# Patient Record
Sex: MALE | Race: WHITE | NOT HISPANIC OR LATINO | Employment: OTHER | URBAN - METROPOLITAN AREA
[De-identification: names, ages, dates, MRNs, and addresses within clinical notes are randomized per-mention and may not be internally consistent; named-entity substitution may affect disease eponyms.]

---

## 2017-01-09 ENCOUNTER — OFFICE VISIT - HEALTHEAST (OUTPATIENT)
Dept: INTERNAL MEDICINE | Facility: CLINIC | Age: 77
End: 2017-01-09

## 2017-01-09 DIAGNOSIS — N39.0 UTI (URINARY TRACT INFECTION): ICD-10-CM

## 2017-01-09 LAB
CHOLEST SERPL-MCNC: 153 MG/DL
FASTING STATUS PATIENT QL REPORTED: NO
HBA1C MFR BLD: 7.5 % (ref 3.5–6)
HDLC SERPL-MCNC: 51 MG/DL
LDLC SERPL CALC-MCNC: 81 MG/DL
PSA SERPL-MCNC: 5.7 NG/ML (ref 0–6.5)
TRIGL SERPL-MCNC: 104 MG/DL

## 2017-01-09 ASSESSMENT — MIFFLIN-ST. JEOR: SCORE: 1400.52

## 2017-01-10 ENCOUNTER — COMMUNICATION - HEALTHEAST (OUTPATIENT)
Dept: INTERNAL MEDICINE | Facility: CLINIC | Age: 77
End: 2017-01-10

## 2017-01-12 ENCOUNTER — COMMUNICATION - HEALTHEAST (OUTPATIENT)
Dept: INTERNAL MEDICINE | Facility: CLINIC | Age: 77
End: 2017-01-12

## 2017-02-16 ENCOUNTER — COMMUNICATION - HEALTHEAST (OUTPATIENT)
Dept: INTERNAL MEDICINE | Facility: CLINIC | Age: 77
End: 2017-02-16

## 2017-03-02 ENCOUNTER — RECORDS - HEALTHEAST (OUTPATIENT)
Dept: ADMINISTRATIVE | Facility: OTHER | Age: 77
End: 2017-03-02

## 2017-03-16 ENCOUNTER — COMMUNICATION - HEALTHEAST (OUTPATIENT)
Dept: INTERNAL MEDICINE | Facility: CLINIC | Age: 77
End: 2017-03-16

## 2017-03-16 DIAGNOSIS — E11.9 DIABETES (H): ICD-10-CM

## 2017-03-23 ENCOUNTER — OFFICE VISIT - HEALTHEAST (OUTPATIENT)
Dept: INTERNAL MEDICINE | Facility: CLINIC | Age: 77
End: 2017-03-23

## 2017-03-23 DIAGNOSIS — E11.9 DIABETES MELLITUS, TYPE 2 (H): ICD-10-CM

## 2017-03-23 LAB
CHOLEST SERPL-MCNC: 149 MG/DL
FASTING STATUS PATIENT QL REPORTED: YES
HBA1C MFR BLD: 7.1 % (ref 3.5–6)
HDLC SERPL-MCNC: 56 MG/DL
LDLC SERPL CALC-MCNC: 77 MG/DL
TRIGL SERPL-MCNC: 80 MG/DL

## 2017-03-23 ASSESSMENT — MIFFLIN-ST. JEOR: SCORE: 1377.84

## 2017-03-24 ENCOUNTER — COMMUNICATION - HEALTHEAST (OUTPATIENT)
Dept: INTERNAL MEDICINE | Facility: CLINIC | Age: 77
End: 2017-03-24

## 2017-04-17 ENCOUNTER — RECORDS - HEALTHEAST (OUTPATIENT)
Dept: ADMINISTRATIVE | Facility: OTHER | Age: 77
End: 2017-04-17

## 2017-06-23 ENCOUNTER — COMMUNICATION - HEALTHEAST (OUTPATIENT)
Dept: INTERNAL MEDICINE | Facility: CLINIC | Age: 77
End: 2017-06-23

## 2017-06-23 ENCOUNTER — OFFICE VISIT - HEALTHEAST (OUTPATIENT)
Dept: INTERNAL MEDICINE | Facility: CLINIC | Age: 77
End: 2017-06-23

## 2017-06-23 DIAGNOSIS — E11.9 DIABETES (H): ICD-10-CM

## 2017-06-23 DIAGNOSIS — E11.9 DIABETES MELLITUS, TYPE 2 (H): ICD-10-CM

## 2017-06-23 LAB
CHOLEST SERPL-MCNC: 152 MG/DL
FASTING STATUS PATIENT QL REPORTED: NO
HBA1C MFR BLD: 6.9 % (ref 3.5–6)
HDLC SERPL-MCNC: 56 MG/DL
LDLC SERPL CALC-MCNC: 80 MG/DL
TRIGL SERPL-MCNC: 78 MG/DL

## 2017-06-23 ASSESSMENT — MIFFLIN-ST. JEOR: SCORE: 1382.37

## 2017-06-26 ENCOUNTER — COMMUNICATION - HEALTHEAST (OUTPATIENT)
Dept: INTERNAL MEDICINE | Facility: CLINIC | Age: 77
End: 2017-06-26

## 2017-09-12 ENCOUNTER — COMMUNICATION - HEALTHEAST (OUTPATIENT)
Dept: INTERNAL MEDICINE | Facility: CLINIC | Age: 77
End: 2017-09-12

## 2017-09-13 ENCOUNTER — RECORDS - HEALTHEAST (OUTPATIENT)
Dept: ADMINISTRATIVE | Facility: OTHER | Age: 77
End: 2017-09-13

## 2017-09-15 ENCOUNTER — OFFICE VISIT - HEALTHEAST (OUTPATIENT)
Dept: INTERNAL MEDICINE | Facility: CLINIC | Age: 77
End: 2017-09-15

## 2017-09-15 DIAGNOSIS — E11.9 DIABETES MELLITUS, TYPE 2 (H): ICD-10-CM

## 2017-09-15 ASSESSMENT — MIFFLIN-ST. JEOR: SCORE: 1368.77

## 2017-09-21 ENCOUNTER — COMMUNICATION - HEALTHEAST (OUTPATIENT)
Dept: INTERNAL MEDICINE | Facility: CLINIC | Age: 77
End: 2017-09-21

## 2017-09-21 DIAGNOSIS — E11.9 DIABETES (H): ICD-10-CM

## 2017-09-22 ENCOUNTER — OFFICE VISIT - HEALTHEAST (OUTPATIENT)
Dept: INTERNAL MEDICINE | Facility: CLINIC | Age: 77
End: 2017-09-22

## 2017-09-22 ENCOUNTER — COMMUNICATION - HEALTHEAST (OUTPATIENT)
Dept: TELEHEALTH | Facility: CLINIC | Age: 77
End: 2017-09-22

## 2017-09-22 DIAGNOSIS — E11.9 DIABETES MELLITUS, TYPE 2 (H): ICD-10-CM

## 2017-09-22 LAB
CHOLEST SERPL-MCNC: 133 MG/DL
FASTING STATUS PATIENT QL REPORTED: YES
HBA1C MFR BLD: 7.3 % (ref 3.5–6)
HDLC SERPL-MCNC: 43 MG/DL
LDLC SERPL CALC-MCNC: 76 MG/DL
TRIGL SERPL-MCNC: 68 MG/DL

## 2017-09-22 ASSESSMENT — MIFFLIN-ST. JEOR: SCORE: 1355.16

## 2017-09-25 ENCOUNTER — COMMUNICATION - HEALTHEAST (OUTPATIENT)
Dept: INTERNAL MEDICINE | Facility: CLINIC | Age: 77
End: 2017-09-25

## 2017-10-24 ENCOUNTER — AMBULATORY - HEALTHEAST (OUTPATIENT)
Dept: NURSING | Facility: CLINIC | Age: 77
End: 2017-10-24

## 2017-10-24 DIAGNOSIS — Z23 NEED FOR PROPHYLACTIC VACCINATION AND INOCULATION AGAINST INFLUENZA: ICD-10-CM

## 2017-11-13 ENCOUNTER — COMMUNICATION - HEALTHEAST (OUTPATIENT)
Dept: INTERNAL MEDICINE | Facility: CLINIC | Age: 77
End: 2017-11-13

## 2017-11-17 ENCOUNTER — RECORDS - HEALTHEAST (OUTPATIENT)
Dept: ADMINISTRATIVE | Facility: OTHER | Age: 77
End: 2017-11-17

## 2017-11-28 ENCOUNTER — COMMUNICATION - HEALTHEAST (OUTPATIENT)
Dept: INTERNAL MEDICINE | Facility: CLINIC | Age: 77
End: 2017-11-28

## 2017-11-30 ENCOUNTER — OFFICE VISIT - HEALTHEAST (OUTPATIENT)
Dept: INTERNAL MEDICINE | Facility: CLINIC | Age: 77
End: 2017-11-30

## 2017-11-30 DIAGNOSIS — E11.9 DIABETES MELLITUS, TYPE 2 (H): ICD-10-CM

## 2017-11-30 LAB
CHOLEST SERPL-MCNC: 172 MG/DL
FASTING STATUS PATIENT QL REPORTED: NORMAL
HBA1C MFR BLD: 6.8 % (ref 3.5–6)
HDLC SERPL-MCNC: 64 MG/DL
LDLC SERPL CALC-MCNC: 95 MG/DL
TRIGL SERPL-MCNC: 67 MG/DL

## 2017-11-30 ASSESSMENT — MIFFLIN-ST. JEOR: SCORE: 1341.55

## 2017-12-01 ENCOUNTER — COMMUNICATION - HEALTHEAST (OUTPATIENT)
Dept: INTERNAL MEDICINE | Facility: CLINIC | Age: 77
End: 2017-12-01

## 2017-12-18 ENCOUNTER — COMMUNICATION - HEALTHEAST (OUTPATIENT)
Dept: INTERNAL MEDICINE | Facility: CLINIC | Age: 77
End: 2017-12-18

## 2017-12-18 DIAGNOSIS — E11.9 DIABETES (H): ICD-10-CM

## 2017-12-20 ENCOUNTER — RECORDS - HEALTHEAST (OUTPATIENT)
Dept: ADMINISTRATIVE | Facility: OTHER | Age: 77
End: 2017-12-20

## 2018-02-13 ENCOUNTER — COMMUNICATION - HEALTHEAST (OUTPATIENT)
Dept: INTERNAL MEDICINE | Facility: CLINIC | Age: 78
End: 2018-02-13

## 2018-02-28 ENCOUNTER — RECORDS - HEALTHEAST (OUTPATIENT)
Dept: ADMINISTRATIVE | Facility: OTHER | Age: 78
End: 2018-02-28

## 2018-03-28 ENCOUNTER — COMMUNICATION - HEALTHEAST (OUTPATIENT)
Dept: INTERNAL MEDICINE | Facility: CLINIC | Age: 78
End: 2018-03-28

## 2018-03-28 DIAGNOSIS — E11.9 DIABETES (H): ICD-10-CM

## 2018-04-23 ENCOUNTER — RECORDS - HEALTHEAST (OUTPATIENT)
Dept: ADMINISTRATIVE | Facility: OTHER | Age: 78
End: 2018-04-23

## 2018-04-25 ENCOUNTER — RECORDS - HEALTHEAST (OUTPATIENT)
Dept: ADMINISTRATIVE | Facility: OTHER | Age: 78
End: 2018-04-25

## 2018-05-04 ENCOUNTER — RECORDS - HEALTHEAST (OUTPATIENT)
Dept: ADMINISTRATIVE | Facility: OTHER | Age: 78
End: 2018-05-04

## 2018-06-19 ENCOUNTER — OFFICE VISIT - HEALTHEAST (OUTPATIENT)
Dept: INTERNAL MEDICINE | Facility: CLINIC | Age: 78
End: 2018-06-19

## 2018-06-19 DIAGNOSIS — Z00.00 ROUTINE GENERAL MEDICAL EXAMINATION AT A HEALTH CARE FACILITY: ICD-10-CM

## 2018-06-19 LAB
ALBUMIN SERPL-MCNC: 4.1 G/DL (ref 3.5–5)
ALBUMIN UR-MCNC: NEGATIVE MG/DL
ALP SERPL-CCNC: 79 U/L (ref 45–120)
ALT SERPL W P-5'-P-CCNC: 27 U/L (ref 0–45)
ANION GAP SERPL CALCULATED.3IONS-SCNC: 11 MMOL/L (ref 5–18)
APPEARANCE UR: CLEAR
AST SERPL W P-5'-P-CCNC: 26 U/L (ref 0–40)
BACTERIA #/AREA URNS HPF: ABNORMAL HPF
BILIRUB SERPL-MCNC: 1 MG/DL (ref 0–1)
BILIRUB UR QL STRIP: NEGATIVE
BUN SERPL-MCNC: 18 MG/DL (ref 8–28)
CALCIUM SERPL-MCNC: 9.5 MG/DL (ref 8.5–10.5)
CHLORIDE BLD-SCNC: 103 MMOL/L (ref 98–107)
CHOLEST SERPL-MCNC: 148 MG/DL
CO2 SERPL-SCNC: 25 MMOL/L (ref 22–31)
COLOR UR AUTO: YELLOW
CREAT SERPL-MCNC: 1.29 MG/DL (ref 0.7–1.3)
ERYTHROCYTE [DISTWIDTH] IN BLOOD BY AUTOMATED COUNT: 13.4 % (ref 11–14.5)
FASTING STATUS PATIENT QL REPORTED: YES
GFR SERPL CREATININE-BSD FRML MDRD: 54 ML/MIN/1.73M2
GLUCOSE BLD-MCNC: 174 MG/DL (ref 70–125)
GLUCOSE UR STRIP-MCNC: NEGATIVE MG/DL
HBA1C MFR BLD: 7.5 % (ref 3.5–6)
HCT VFR BLD AUTO: 44.9 % (ref 40–54)
HDLC SERPL-MCNC: 54 MG/DL
HGB BLD-MCNC: 15.6 G/DL (ref 14–18)
HGB UR QL STRIP: NEGATIVE
KETONES UR STRIP-MCNC: NEGATIVE MG/DL
LDLC SERPL CALC-MCNC: 77 MG/DL
LEUKOCYTE ESTERASE UR QL STRIP: ABNORMAL
MCH RBC QN AUTO: 32 PG (ref 27–34)
MCHC RBC AUTO-ENTMCNC: 34.7 G/DL (ref 32–36)
MCV RBC AUTO: 92 FL (ref 80–100)
NITRATE UR QL: NEGATIVE
PH UR STRIP: 7 [PH] (ref 5–8)
PLATELET # BLD AUTO: 196 THOU/UL (ref 140–440)
PMV BLD AUTO: 9.4 FL (ref 8.5–12.5)
POTASSIUM BLD-SCNC: 4.3 MMOL/L (ref 3.5–5)
PROT SERPL-MCNC: 7.3 G/DL (ref 6–8)
PSA SERPL-MCNC: 5.5 NG/ML (ref 0–6.5)
RBC # BLD AUTO: 4.87 MILL/UL (ref 4.4–6.2)
RBC #/AREA URNS AUTO: ABNORMAL HPF
SODIUM SERPL-SCNC: 139 MMOL/L (ref 136–145)
SP GR UR STRIP: 1.02 (ref 1–1.03)
SQUAMOUS #/AREA URNS AUTO: ABNORMAL LPF
TRIGL SERPL-MCNC: 83 MG/DL
TSH SERPL DL<=0.005 MIU/L-ACNC: 2.04 UIU/ML (ref 0.3–5)
UROBILINOGEN UR STRIP-ACNC: ABNORMAL
WBC #/AREA URNS AUTO: ABNORMAL HPF
WBC: 7.3 THOU/UL (ref 4–11)

## 2018-06-19 ASSESSMENT — MIFFLIN-ST. JEOR: SCORE: 1355.16

## 2018-06-20 ENCOUNTER — COMMUNICATION - HEALTHEAST (OUTPATIENT)
Dept: INTERNAL MEDICINE | Facility: CLINIC | Age: 78
End: 2018-06-20

## 2018-06-20 LAB — BACTERIA SPEC CULT: NO GROWTH

## 2018-06-26 ENCOUNTER — COMMUNICATION - HEALTHEAST (OUTPATIENT)
Dept: INTERNAL MEDICINE | Facility: CLINIC | Age: 78
End: 2018-06-26

## 2018-06-26 DIAGNOSIS — E11.9 DIABETES (H): ICD-10-CM

## 2018-09-23 ENCOUNTER — COMMUNICATION - HEALTHEAST (OUTPATIENT)
Dept: INTERNAL MEDICINE | Facility: CLINIC | Age: 78
End: 2018-09-23

## 2018-09-23 DIAGNOSIS — E11.9 DIABETES (H): ICD-10-CM

## 2018-10-08 ENCOUNTER — AMBULATORY - HEALTHEAST (OUTPATIENT)
Dept: NURSING | Facility: CLINIC | Age: 78
End: 2018-10-08

## 2018-10-08 DIAGNOSIS — Z23 NEED FOR INFLUENZA VACCINATION: ICD-10-CM

## 2018-10-18 ENCOUNTER — RECORDS - HEALTHEAST (OUTPATIENT)
Dept: ADMINISTRATIVE | Facility: OTHER | Age: 78
End: 2018-10-18

## 2018-10-29 ENCOUNTER — OFFICE VISIT - HEALTHEAST (OUTPATIENT)
Dept: INTERNAL MEDICINE | Facility: CLINIC | Age: 78
End: 2018-10-29

## 2018-10-29 DIAGNOSIS — E11.9 DIABETES MELLITUS, TYPE 2 (H): ICD-10-CM

## 2018-10-29 LAB — HBA1C MFR BLD: 7.9 % (ref 3.5–6)

## 2018-10-29 ASSESSMENT — MIFFLIN-ST. JEOR: SCORE: 1364.23

## 2018-10-30 ENCOUNTER — OFFICE VISIT - HEALTHEAST (OUTPATIENT)
Dept: CARDIOLOGY | Facility: CLINIC | Age: 78
End: 2018-10-30

## 2018-10-30 DIAGNOSIS — R07.89 CHEST DISCOMFORT: ICD-10-CM

## 2018-10-30 LAB
CHOLEST SERPL-MCNC: 146 MG/DL
FASTING STATUS PATIENT QL REPORTED: YES
FASTING STATUS PATIENT QL REPORTED: YES
GLUCOSE BLD-MCNC: 138 MG/DL (ref 70–125)
HDLC SERPL-MCNC: 58 MG/DL
LDLC SERPL CALC-MCNC: 67 MG/DL
TRIGL SERPL-MCNC: 104 MG/DL

## 2018-10-30 ASSESSMENT — MIFFLIN-ST. JEOR: SCORE: 1364.23

## 2018-10-31 ENCOUNTER — COMMUNICATION - HEALTHEAST (OUTPATIENT)
Dept: INTERNAL MEDICINE | Facility: CLINIC | Age: 78
End: 2018-10-31

## 2018-10-31 LAB
ATRIAL RATE - MUSE: 62 BPM
DIASTOLIC BLOOD PRESSURE - MUSE: NORMAL MMHG
INTERPRETATION ECG - MUSE: NORMAL
P AXIS - MUSE: 79 DEGREES
PR INTERVAL - MUSE: 156 MS
QRS DURATION - MUSE: 104 MS
QT - MUSE: 406 MS
QTC - MUSE: 412 MS
R AXIS - MUSE: -2 DEGREES
SYSTOLIC BLOOD PRESSURE - MUSE: NORMAL MMHG
T AXIS - MUSE: 55 DEGREES
VENTRICULAR RATE- MUSE: 62 BPM

## 2018-11-01 ENCOUNTER — COMMUNICATION - HEALTHEAST (OUTPATIENT)
Dept: INTERNAL MEDICINE | Facility: CLINIC | Age: 78
End: 2018-11-01

## 2018-11-14 ENCOUNTER — COMMUNICATION - HEALTHEAST (OUTPATIENT)
Dept: CARDIOLOGY | Facility: CLINIC | Age: 78
End: 2018-11-14

## 2018-11-14 ENCOUNTER — HOSPITAL ENCOUNTER (OUTPATIENT)
Dept: CARDIOLOGY | Facility: CLINIC | Age: 78
Discharge: HOME | End: 2018-11-14
Attending: INTERNAL MEDICINE

## 2018-11-14 DIAGNOSIS — R07.89 CHEST DISCOMFORT: ICD-10-CM

## 2018-11-14 LAB
CV STRESS CURRENT BP HE: NORMAL
CV STRESS CURRENT HR HE: 103
CV STRESS CURRENT HR HE: 103
CV STRESS CURRENT HR HE: 104
CV STRESS CURRENT HR HE: 104
CV STRESS CURRENT HR HE: 110
CV STRESS CURRENT HR HE: 111
CV STRESS CURRENT HR HE: 112
CV STRESS CURRENT HR HE: 113
CV STRESS CURRENT HR HE: 120
CV STRESS CURRENT HR HE: 121
CV STRESS CURRENT HR HE: 122
CV STRESS CURRENT HR HE: 122
CV STRESS CURRENT HR HE: 126
CV STRESS CURRENT HR HE: 132
CV STRESS CURRENT HR HE: 135
CV STRESS CURRENT HR HE: 145
CV STRESS CURRENT HR HE: 145
CV STRESS CURRENT HR HE: 148
CV STRESS CURRENT HR HE: 150
CV STRESS CURRENT HR HE: 66
CV STRESS CURRENT HR HE: 74
CV STRESS CURRENT HR HE: 88
CV STRESS CURRENT HR HE: 90
CV STRESS CURRENT HR HE: 94
CV STRESS CURRENT HR HE: 94
CV STRESS CURRENT HR HE: 95
CV STRESS CURRENT HR HE: 95
CV STRESS CURRENT HR HE: 96
CV STRESS CURRENT HR HE: 96
CV STRESS CURRENT HR HE: 99
CV STRESS DEVIATION TIME HE: NORMAL
CV STRESS ECHO PERCENT HR HE: NORMAL
CV STRESS EXERCISE STAGE HE: NORMAL
CV STRESS FINAL RESTING BP HE: NORMAL
CV STRESS FINAL RESTING HR HE: 95
CV STRESS MAX HR HE: 151
CV STRESS MAX TREADMILL GRADE HE: 16
CV STRESS MAX TREADMILL SPEED HE: 4.2
CV STRESS PEAK DIA BP HE: NORMAL
CV STRESS PEAK SYS BP HE: NORMAL
CV STRESS PHASE HE: NORMAL
CV STRESS PROTOCOL HE: NORMAL
CV STRESS RESTING PT POSITION HE: NORMAL
CV STRESS RESTING PT POSITION HE: NORMAL
CV STRESS ST DEVIATION AMOUNT HE: NORMAL
CV STRESS ST DEVIATION ELEVATION HE: NORMAL
CV STRESS ST EVELATION AMOUNT HE: NORMAL
CV STRESS TEST TYPE HE: NORMAL
CV STRESS TOTAL STAGE TIME MIN 1 HE: NORMAL
STRESS ECHO BASELINE BP: NORMAL
STRESS ECHO BASELINE HR: 66
STRESS ECHO CALCULATED PERCENT HR: 106 %
STRESS ECHO LAST STRESS BP: NORMAL
STRESS ECHO LAST STRESS HR: 150
STRESS ECHO POST ESTIMATED WORKLOAD: 11.9
STRESS ECHO POST EXERCISE DUR MIN: 10
STRESS ECHO POST EXERCISE DUR SEC: 15
STRESS ECHO TARGET HR: 122

## 2018-12-28 ENCOUNTER — COMMUNICATION - HEALTHEAST (OUTPATIENT)
Dept: INTERNAL MEDICINE | Facility: CLINIC | Age: 78
End: 2018-12-28

## 2018-12-28 DIAGNOSIS — E11.9 DIABETES (H): ICD-10-CM

## 2019-01-31 ENCOUNTER — OFFICE VISIT - HEALTHEAST (OUTPATIENT)
Dept: INTERNAL MEDICINE | Facility: CLINIC | Age: 79
End: 2019-01-31

## 2019-01-31 DIAGNOSIS — E11.8 TYPE 2 DIABETES MELLITUS WITH COMPLICATION, WITHOUT LONG-TERM CURRENT USE OF INSULIN (H): ICD-10-CM

## 2019-01-31 LAB
CHOLEST SERPL-MCNC: 162 MG/DL
FASTING STATUS PATIENT QL REPORTED: YES
FASTING STATUS PATIENT QL REPORTED: YES
GLUCOSE BLD-MCNC: 163 MG/DL (ref 70–125)
HBA1C MFR BLD: 7.4 % (ref 3.5–6)
HDLC SERPL-MCNC: 65 MG/DL
LDLC SERPL CALC-MCNC: 80 MG/DL
TRIGL SERPL-MCNC: 86 MG/DL

## 2019-01-31 ASSESSMENT — MIFFLIN-ST. JEOR: SCORE: 1373.3

## 2019-02-01 ENCOUNTER — COMMUNICATION - HEALTHEAST (OUTPATIENT)
Dept: INTERNAL MEDICINE | Facility: CLINIC | Age: 79
End: 2019-02-01

## 2019-04-02 ENCOUNTER — COMMUNICATION - HEALTHEAST (OUTPATIENT)
Dept: INTERNAL MEDICINE | Facility: CLINIC | Age: 79
End: 2019-04-02

## 2019-04-02 DIAGNOSIS — E11.9 DIABETES (H): ICD-10-CM

## 2019-05-02 ENCOUNTER — OFFICE VISIT - HEALTHEAST (OUTPATIENT)
Dept: INTERNAL MEDICINE | Facility: CLINIC | Age: 79
End: 2019-05-02

## 2019-05-02 DIAGNOSIS — E11.8 TYPE 2 DIABETES MELLITUS WITH COMPLICATION, WITHOUT LONG-TERM CURRENT USE OF INSULIN (H): ICD-10-CM

## 2019-05-02 LAB
CHOLEST SERPL-MCNC: 135 MG/DL
CREAT UR-MCNC: 173.2 MG/DL
FASTING STATUS PATIENT QL REPORTED: YES
FASTING STATUS PATIENT QL REPORTED: YES
GLUCOSE BLD-MCNC: 165 MG/DL (ref 70–125)
HBA1C MFR BLD: 8.1 % (ref 3.5–6)
HDLC SERPL-MCNC: 55 MG/DL
LDLC SERPL CALC-MCNC: 64 MG/DL
MICROALBUMIN UR-MCNC: 0.94 MG/DL (ref 0–1.99)
MICROALBUMIN/CREAT UR: 5.4 MG/G
TRIGL SERPL-MCNC: 81 MG/DL

## 2019-05-02 ASSESSMENT — MIFFLIN-ST. JEOR: SCORE: 1368.77

## 2019-05-03 ENCOUNTER — COMMUNICATION - HEALTHEAST (OUTPATIENT)
Dept: INTERNAL MEDICINE | Facility: CLINIC | Age: 79
End: 2019-05-03

## 2019-08-02 ENCOUNTER — AMBULATORY - HEALTHEAST (OUTPATIENT)
Dept: INTERNAL MEDICINE | Facility: CLINIC | Age: 79
End: 2019-08-02

## 2019-08-02 ENCOUNTER — OFFICE VISIT - HEALTHEAST (OUTPATIENT)
Dept: INTERNAL MEDICINE | Facility: CLINIC | Age: 79
End: 2019-08-02

## 2019-08-02 DIAGNOSIS — Z00.00 ROUTINE GENERAL MEDICAL EXAMINATION AT A HEALTH CARE FACILITY: ICD-10-CM

## 2019-08-02 DIAGNOSIS — E11.8 TYPE 2 DIABETES MELLITUS WITH COMPLICATION, WITHOUT LONG-TERM CURRENT USE OF INSULIN (H): ICD-10-CM

## 2019-08-02 LAB
ALBUMIN SERPL-MCNC: 4.1 G/DL (ref 3.5–5)
ALBUMIN UR-MCNC: NEGATIVE MG/DL
ALP SERPL-CCNC: 81 U/L (ref 45–120)
ALT SERPL W P-5'-P-CCNC: 21 U/L (ref 0–45)
ANION GAP SERPL CALCULATED.3IONS-SCNC: 9 MMOL/L (ref 5–18)
APPEARANCE UR: CLEAR
AST SERPL W P-5'-P-CCNC: 22 U/L (ref 0–40)
BACTERIA #/AREA URNS HPF: ABNORMAL HPF
BILIRUB SERPL-MCNC: 1 MG/DL (ref 0–1)
BILIRUB UR QL STRIP: NEGATIVE
BUN SERPL-MCNC: 18 MG/DL (ref 8–28)
CALCIUM SERPL-MCNC: 9.4 MG/DL (ref 8.5–10.5)
CHLORIDE BLD-SCNC: 103 MMOL/L (ref 98–107)
CHOLEST SERPL-MCNC: 147 MG/DL
CO2 SERPL-SCNC: 27 MMOL/L (ref 22–31)
COLOR UR AUTO: YELLOW
CREAT SERPL-MCNC: 1.3 MG/DL (ref 0.7–1.3)
CREAT UR-MCNC: 148.7 MG/DL
ERYTHROCYTE [DISTWIDTH] IN BLOOD BY AUTOMATED COUNT: 11.8 % (ref 11–14.5)
FASTING STATUS PATIENT QL REPORTED: YES
GFR SERPL CREATININE-BSD FRML MDRD: 54 ML/MIN/1.73M2
GLUCOSE BLD-MCNC: 174 MG/DL (ref 70–125)
GLUCOSE UR STRIP-MCNC: NEGATIVE MG/DL
HBA1C MFR BLD: 8 % (ref 3.5–6)
HCT VFR BLD AUTO: 44.7 % (ref 40–54)
HDLC SERPL-MCNC: 57 MG/DL
HGB BLD-MCNC: 15.3 G/DL (ref 14–18)
HGB UR QL STRIP: ABNORMAL
KETONES UR STRIP-MCNC: NEGATIVE MG/DL
LDLC SERPL CALC-MCNC: 74 MG/DL
LEUKOCYTE ESTERASE UR QL STRIP: ABNORMAL
MCH RBC QN AUTO: 32.3 PG (ref 27–34)
MCHC RBC AUTO-ENTMCNC: 34.2 G/DL (ref 32–36)
MCV RBC AUTO: 94 FL (ref 80–100)
MICROALBUMIN UR-MCNC: 1.09 MG/DL (ref 0–1.99)
MICROALBUMIN/CREAT UR: 7.3 MG/G
NITRATE UR QL: NEGATIVE
PH UR STRIP: 6 [PH] (ref 5–8)
PLATELET # BLD AUTO: 202 THOU/UL (ref 140–440)
PMV BLD AUTO: 7.2 FL (ref 7–10)
POTASSIUM BLD-SCNC: 4 MMOL/L (ref 3.5–5)
PROT SERPL-MCNC: 7 G/DL (ref 6–8)
RBC # BLD AUTO: 4.75 MILL/UL (ref 4.4–6.2)
RBC #/AREA URNS AUTO: ABNORMAL HPF
SODIUM SERPL-SCNC: 139 MMOL/L (ref 136–145)
SP GR UR STRIP: 1.01 (ref 1–1.03)
SQUAMOUS #/AREA URNS AUTO: ABNORMAL LPF
TRIGL SERPL-MCNC: 81 MG/DL
TSH SERPL DL<=0.005 MIU/L-ACNC: 1.92 UIU/ML (ref 0.3–5)
UROBILINOGEN UR STRIP-ACNC: ABNORMAL
WBC #/AREA URNS AUTO: ABNORMAL HPF
WBC: 6.9 THOU/UL (ref 4–11)

## 2019-08-02 ASSESSMENT — MIFFLIN-ST. JEOR: SCORE: 1341.55

## 2019-08-03 LAB — BACTERIA SPEC CULT: NORMAL

## 2019-08-05 ENCOUNTER — COMMUNICATION - HEALTHEAST (OUTPATIENT)
Dept: INTERNAL MEDICINE | Facility: CLINIC | Age: 79
End: 2019-08-05

## 2019-09-03 ENCOUNTER — AMBULATORY - HEALTHEAST (OUTPATIENT)
Dept: EDUCATION SERVICES | Facility: CLINIC | Age: 79
End: 2019-09-03

## 2019-09-03 DIAGNOSIS — Z00.00 GENERAL MEDICAL EXAM: ICD-10-CM

## 2019-09-03 DIAGNOSIS — E11.8 TYPE 2 DIABETES MELLITUS WITH COMPLICATION, WITHOUT LONG-TERM CURRENT USE OF INSULIN (H): ICD-10-CM

## 2019-09-03 RX ORDER — CYCLOPENTOLATE HYDROCHLORIDE 10 MG/ML
1 SOLUTION/ DROPS OPHTHALMIC DAILY
Qty: 180 TABLET | Refills: 0 | Status: SHIPPED
Start: 2019-09-03

## 2019-09-17 ENCOUNTER — RECORDS - HEALTHEAST (OUTPATIENT)
Dept: ADMINISTRATIVE | Facility: OTHER | Age: 79
End: 2019-09-17

## 2019-09-19 ENCOUNTER — HOSPITAL ENCOUNTER (OUTPATIENT)
Dept: PHYSICAL MEDICINE AND REHAB | Facility: CLINIC | Age: 79
Discharge: HOME | End: 2019-09-19
Attending: PHYSICAL MEDICINE & REHABILITATION

## 2019-09-19 DIAGNOSIS — M48.061 STENOSIS OF LATERAL RECESS OF LUMBAR SPINE: ICD-10-CM

## 2019-09-19 DIAGNOSIS — M48.061 LUMBAR FORAMINAL STENOSIS: ICD-10-CM

## 2019-09-19 DIAGNOSIS — M47.816 LUMBAR FACET ARTHROPATHY: ICD-10-CM

## 2019-09-19 DIAGNOSIS — M54.42 ACUTE LEFT-SIDED LOW BACK PAIN WITH LEFT-SIDED SCIATICA: ICD-10-CM

## 2019-09-19 ASSESSMENT — MIFFLIN-ST. JEOR: SCORE: 1375.57

## 2019-10-01 ENCOUNTER — RECORDS - HEALTHEAST (OUTPATIENT)
Dept: HEALTH INFORMATION MANAGEMENT | Facility: CLINIC | Age: 79
End: 2019-10-01

## 2019-10-04 ENCOUNTER — RECORDS - HEALTHEAST (OUTPATIENT)
Dept: ADMINISTRATIVE | Facility: OTHER | Age: 79
End: 2019-10-04

## 2019-10-07 ENCOUNTER — COMMUNICATION - HEALTHEAST (OUTPATIENT)
Dept: INTERNAL MEDICINE | Facility: CLINIC | Age: 79
End: 2019-10-07

## 2019-10-07 DIAGNOSIS — Z00.00 ROUTINE GENERAL MEDICAL EXAMINATION AT A HEALTH CARE FACILITY: ICD-10-CM

## 2019-10-15 ENCOUNTER — AMBULATORY - HEALTHEAST (OUTPATIENT)
Dept: NURSING | Facility: CLINIC | Age: 79
End: 2019-10-15

## 2019-10-15 DIAGNOSIS — Z23 FLU VACCINE NEED: ICD-10-CM

## 2019-10-17 ENCOUNTER — COMMUNICATION - HEALTHEAST (OUTPATIENT)
Dept: PHYSICAL MEDICINE AND REHAB | Facility: CLINIC | Age: 79
End: 2019-10-17

## 2019-10-28 ENCOUNTER — COMMUNICATION - HEALTHEAST (OUTPATIENT)
Dept: INTERNAL MEDICINE | Facility: CLINIC | Age: 79
End: 2019-10-28

## 2019-11-30 ENCOUNTER — COMMUNICATION - HEALTHEAST (OUTPATIENT)
Dept: SCHEDULING | Facility: CLINIC | Age: 79
End: 2019-11-30

## 2019-12-02 ENCOUNTER — COMMUNICATION - HEALTHEAST (OUTPATIENT)
Dept: INTERNAL MEDICINE | Facility: CLINIC | Age: 79
End: 2019-12-02

## 2019-12-02 DIAGNOSIS — Z00.00 ROUTINE GENERAL MEDICAL EXAMINATION AT A HEALTH CARE FACILITY: ICD-10-CM

## 2019-12-06 ENCOUNTER — OFFICE VISIT - HEALTHEAST (OUTPATIENT)
Dept: INTERNAL MEDICINE | Facility: CLINIC | Age: 79
End: 2019-12-06

## 2019-12-06 ENCOUNTER — AMBULATORY - HEALTHEAST (OUTPATIENT)
Dept: INTERNAL MEDICINE | Facility: CLINIC | Age: 79
End: 2019-12-06

## 2019-12-06 ENCOUNTER — COMMUNICATION - HEALTHEAST (OUTPATIENT)
Dept: PHYSICAL MEDICINE AND REHAB | Facility: CLINIC | Age: 79
End: 2019-12-06

## 2019-12-06 DIAGNOSIS — R05.9 COUGH: ICD-10-CM

## 2019-12-06 DIAGNOSIS — M47.816 LUMBAR FACET ARTHROPATHY: ICD-10-CM

## 2019-12-06 DIAGNOSIS — Z00.00 ROUTINE GENERAL MEDICAL EXAMINATION AT A HEALTH CARE FACILITY: ICD-10-CM

## 2019-12-06 DIAGNOSIS — E11.69 TYPE 2 DIABETES MELLITUS WITH OTHER SPECIFIED COMPLICATION, WITHOUT LONG-TERM CURRENT USE OF INSULIN (H): ICD-10-CM

## 2019-12-06 DIAGNOSIS — M48.061 STENOSIS OF LATERAL RECESS OF LUMBAR SPINE: ICD-10-CM

## 2019-12-06 DIAGNOSIS — M48.061 LUMBAR FORAMINAL STENOSIS: ICD-10-CM

## 2019-12-06 DIAGNOSIS — M54.42 ACUTE LEFT-SIDED LOW BACK PAIN WITH LEFT-SIDED SCIATICA: ICD-10-CM

## 2019-12-06 LAB
CHOLEST SERPL-MCNC: 121 MG/DL
FASTING STATUS PATIENT QL REPORTED: YES
FASTING STATUS PATIENT QL REPORTED: YES
GLUCOSE BLD-MCNC: 178 MG/DL (ref 70–125)
HBA1C MFR BLD: 7.7 % (ref 3.5–6)
HDLC SERPL-MCNC: 43 MG/DL
LDLC SERPL CALC-MCNC: 63 MG/DL
TRIGL SERPL-MCNC: 74 MG/DL

## 2019-12-06 ASSESSMENT — MIFFLIN-ST. JEOR: SCORE: 1355.16

## 2019-12-09 ENCOUNTER — COMMUNICATION - HEALTHEAST (OUTPATIENT)
Dept: INTERNAL MEDICINE | Facility: CLINIC | Age: 79
End: 2019-12-09

## 2020-01-10 ENCOUNTER — OFFICE VISIT - HEALTHEAST (OUTPATIENT)
Dept: INTERNAL MEDICINE | Facility: CLINIC | Age: 80
End: 2020-01-10

## 2020-01-10 ENCOUNTER — COMMUNICATION - HEALTHEAST (OUTPATIENT)
Dept: INTERNAL MEDICINE | Facility: CLINIC | Age: 80
End: 2020-01-10

## 2020-01-10 DIAGNOSIS — E11.69 TYPE 2 DIABETES MELLITUS WITH OTHER SPECIFIED COMPLICATION, WITHOUT LONG-TERM CURRENT USE OF INSULIN (H): ICD-10-CM

## 2020-01-10 DIAGNOSIS — E11.9 DIABETES (H): ICD-10-CM

## 2020-01-10 ASSESSMENT — MIFFLIN-ST. JEOR: SCORE: 1377.84

## 2020-01-15 ENCOUNTER — COMMUNICATION - HEALTHEAST (OUTPATIENT)
Dept: INTERNAL MEDICINE | Facility: CLINIC | Age: 80
End: 2020-01-15

## 2020-01-15 DIAGNOSIS — E11.69 TYPE 2 DIABETES MELLITUS WITH OTHER SPECIFIED COMPLICATION, WITHOUT LONG-TERM CURRENT USE OF INSULIN (H): ICD-10-CM

## 2020-01-17 ENCOUNTER — COMMUNICATION - HEALTHEAST (OUTPATIENT)
Dept: INTERNAL MEDICINE | Facility: CLINIC | Age: 80
End: 2020-01-17

## 2020-01-17 DIAGNOSIS — E11.69 TYPE 2 DIABETES MELLITUS WITH OTHER SPECIFIED COMPLICATION, WITHOUT LONG-TERM CURRENT USE OF INSULIN (H): ICD-10-CM

## 2020-07-09 ENCOUNTER — OFFICE VISIT - HEALTHEAST (OUTPATIENT)
Dept: INTERNAL MEDICINE | Facility: CLINIC | Age: 80
End: 2020-07-09

## 2020-07-09 DIAGNOSIS — E11.8 TYPE 2 DIABETES MELLITUS WITH COMPLICATION, WITHOUT LONG-TERM CURRENT USE OF INSULIN (H): ICD-10-CM

## 2020-08-25 ENCOUNTER — AMBULATORY - HEALTHEAST (OUTPATIENT)
Dept: LAB | Facility: CLINIC | Age: 80
End: 2020-08-25

## 2020-08-25 DIAGNOSIS — E11.8 TYPE 2 DIABETES MELLITUS WITH COMPLICATION, WITHOUT LONG-TERM CURRENT USE OF INSULIN (H): ICD-10-CM

## 2020-08-25 LAB
CHOLEST SERPL-MCNC: 156 MG/DL
CREAT UR-MCNC: 71.9 MG/DL
FASTING STATUS PATIENT QL REPORTED: ABNORMAL
FASTING STATUS PATIENT QL REPORTED: NORMAL
GLUCOSE BLD-MCNC: 140 MG/DL (ref 70–125)
HBA1C MFR BLD: 6.9 %
HDLC SERPL-MCNC: 55 MG/DL
LDLC SERPL CALC-MCNC: 78 MG/DL
MICROALBUMIN UR-MCNC: 0.71 MG/DL (ref 0–1.99)
MICROALBUMIN/CREAT UR: 9.9 MG/G
TRIGL SERPL-MCNC: 114 MG/DL

## 2020-08-26 ENCOUNTER — COMMUNICATION - HEALTHEAST (OUTPATIENT)
Dept: INTERNAL MEDICINE | Facility: CLINIC | Age: 80
End: 2020-08-26

## 2020-09-01 ENCOUNTER — RECORDS - HEALTHEAST (OUTPATIENT)
Dept: ADMINISTRATIVE | Facility: OTHER | Age: 80
End: 2020-09-01

## 2020-10-06 ENCOUNTER — RECORDS - HEALTHEAST (OUTPATIENT)
Dept: ADMINISTRATIVE | Facility: OTHER | Age: 80
End: 2020-10-06

## 2020-10-18 ENCOUNTER — AMBULATORY - HEALTHEAST (OUTPATIENT)
Dept: NURSING | Facility: CLINIC | Age: 80
End: 2020-10-18

## 2020-11-28 ENCOUNTER — COMMUNICATION - HEALTHEAST (OUTPATIENT)
Dept: EDUCATION SERVICES | Facility: CLINIC | Age: 80
End: 2020-11-28

## 2020-11-28 DIAGNOSIS — E11.8 TYPE 2 DIABETES MELLITUS WITH COMPLICATION, WITHOUT LONG-TERM CURRENT USE OF INSULIN (H): ICD-10-CM

## 2021-01-07 ENCOUNTER — COMMUNICATION - HEALTHEAST (OUTPATIENT)
Dept: INTERNAL MEDICINE | Facility: CLINIC | Age: 81
End: 2021-01-07

## 2021-01-07 DIAGNOSIS — E11.9 DIABETES (H): ICD-10-CM

## 2021-01-08 RX ORDER — SIMVASTATIN 40 MG
40 TABLET ORAL DAILY
Qty: 90 TABLET | Refills: 1 | Status: SHIPPED | OUTPATIENT
Start: 2021-01-08 | End: 2021-07-06

## 2021-01-12 ENCOUNTER — RECORDS - HEALTHEAST (OUTPATIENT)
Dept: ADMINISTRATIVE | Facility: OTHER | Age: 81
End: 2021-01-12

## 2021-01-18 ENCOUNTER — RECORDS - HEALTHEAST (OUTPATIENT)
Dept: ADMINISTRATIVE | Facility: OTHER | Age: 81
End: 2021-01-18

## 2021-01-18 ENCOUNTER — COMMUNICATION - HEALTHEAST (OUTPATIENT)
Dept: INTERNAL MEDICINE | Facility: CLINIC | Age: 81
End: 2021-01-18

## 2021-03-26 ENCOUNTER — OFFICE VISIT - HEALTHEAST (OUTPATIENT)
Dept: INTERNAL MEDICINE | Facility: CLINIC | Age: 81
End: 2021-03-26

## 2021-03-26 ENCOUNTER — AMBULATORY - HEALTHEAST (OUTPATIENT)
Dept: LAB | Facility: CLINIC | Age: 81
End: 2021-03-26

## 2021-03-26 DIAGNOSIS — Z12.5 SCREENING FOR PROSTATE CANCER: ICD-10-CM

## 2021-03-26 DIAGNOSIS — E11.8 TYPE 2 DIABETES MELLITUS WITH COMPLICATION, WITHOUT LONG-TERM CURRENT USE OF INSULIN (H): ICD-10-CM

## 2021-03-26 DIAGNOSIS — Z00.00 ROUTINE GENERAL MEDICAL EXAMINATION AT A HEALTH CARE FACILITY: ICD-10-CM

## 2021-03-26 LAB
ALBUMIN SERPL-MCNC: 4.4 G/DL (ref 3.5–5)
ALP SERPL-CCNC: 90 U/L (ref 45–120)
ALT SERPL W P-5'-P-CCNC: 25 U/L (ref 0–45)
ANION GAP SERPL CALCULATED.3IONS-SCNC: 11 MMOL/L (ref 5–18)
AST SERPL W P-5'-P-CCNC: 28 U/L (ref 0–40)
BILIRUB SERPL-MCNC: 0.8 MG/DL (ref 0–1)
BUN SERPL-MCNC: 22 MG/DL (ref 8–28)
CALCIUM SERPL-MCNC: 9.2 MG/DL (ref 8.5–10.5)
CHLORIDE BLD-SCNC: 101 MMOL/L (ref 98–107)
CHOLEST SERPL-MCNC: 162 MG/DL
CO2 SERPL-SCNC: 26 MMOL/L (ref 22–31)
CREAT SERPL-MCNC: 1.3 MG/DL (ref 0.7–1.3)
ERYTHROCYTE [DISTWIDTH] IN BLOOD BY AUTOMATED COUNT: 12.9 % (ref 11–14.5)
FASTING STATUS PATIENT QL REPORTED: YES
GFR SERPL CREATININE-BSD FRML MDRD: 53 ML/MIN/1.73M2
GLUCOSE BLD-MCNC: 188 MG/DL (ref 70–125)
HBA1C MFR BLD: 7.7 %
HCT VFR BLD AUTO: 44.3 % (ref 40–54)
HDLC SERPL-MCNC: 53 MG/DL
HGB BLD-MCNC: 15.2 G/DL (ref 14–18)
LDLC SERPL CALC-MCNC: 89 MG/DL
MCH RBC QN AUTO: 31.7 PG (ref 27–34)
MCHC RBC AUTO-ENTMCNC: 34.3 G/DL (ref 32–36)
MCV RBC AUTO: 92 FL (ref 80–100)
PLATELET # BLD AUTO: 234 THOU/UL (ref 140–440)
PMV BLD AUTO: 9.9 FL (ref 7–10)
POTASSIUM BLD-SCNC: 4.2 MMOL/L (ref 3.5–5)
PROT SERPL-MCNC: 7.4 G/DL (ref 6–8)
PSA SERPL-MCNC: 4.7 NG/ML (ref 0–6.5)
RBC # BLD AUTO: 4.8 MILL/UL (ref 4.4–6.2)
SODIUM SERPL-SCNC: 138 MMOL/L (ref 136–145)
TRIGL SERPL-MCNC: 98 MG/DL
TSH SERPL DL<=0.005 MIU/L-ACNC: 2.6 UIU/ML (ref 0.3–5)
WBC: 8.5 THOU/UL (ref 4–11)

## 2021-03-26 ASSESSMENT — ANXIETY QUESTIONNAIRES
2. NOT BEING ABLE TO STOP OR CONTROL WORRYING: NOT AT ALL
1. FEELING NERVOUS, ANXIOUS, OR ON EDGE: NOT AT ALL

## 2021-03-26 ASSESSMENT — MIFFLIN-ST. JEOR: SCORE: 1364.23

## 2021-03-29 ENCOUNTER — COMMUNICATION - HEALTHEAST (OUTPATIENT)
Dept: INTERNAL MEDICINE | Facility: CLINIC | Age: 81
End: 2021-03-29

## 2021-04-12 ENCOUNTER — HOSPITAL ENCOUNTER (OUTPATIENT)
Dept: PHYSICAL MEDICINE AND REHAB | Facility: CLINIC | Age: 81
Discharge: HOME | End: 2021-04-12
Attending: PHYSICAL MEDICINE & REHABILITATION

## 2021-04-12 DIAGNOSIS — M54.16 LUMBAR RADICULOPATHY: ICD-10-CM

## 2021-04-12 DIAGNOSIS — G89.29 CHRONIC BILATERAL LOW BACK PAIN WITH BILATERAL SCIATICA: ICD-10-CM

## 2021-04-12 DIAGNOSIS — M54.42 CHRONIC BILATERAL LOW BACK PAIN WITH BILATERAL SCIATICA: ICD-10-CM

## 2021-04-12 DIAGNOSIS — M48.061 LUMBAR FORAMINAL STENOSIS: ICD-10-CM

## 2021-04-12 DIAGNOSIS — M54.41 CHRONIC BILATERAL LOW BACK PAIN WITH BILATERAL SCIATICA: ICD-10-CM

## 2021-04-23 ENCOUNTER — HOSPITAL ENCOUNTER (OUTPATIENT)
Dept: MRI IMAGING | Facility: HOSPITAL | Age: 81
Discharge: HOME | End: 2021-04-23
Attending: PHYSICAL MEDICINE & REHABILITATION

## 2021-04-29 ENCOUNTER — HOSPITAL ENCOUNTER (OUTPATIENT)
Dept: PHYSICAL MEDICINE AND REHAB | Facility: CLINIC | Age: 81
Discharge: HOME | End: 2021-04-29
Attending: PHYSICAL MEDICINE & REHABILITATION

## 2021-04-29 DIAGNOSIS — M54.41 CHRONIC BILATERAL LOW BACK PAIN WITH BILATERAL SCIATICA: ICD-10-CM

## 2021-04-29 DIAGNOSIS — M48.061 LUMBAR FORAMINAL STENOSIS: ICD-10-CM

## 2021-04-29 DIAGNOSIS — G89.29 CHRONIC BILATERAL LOW BACK PAIN WITH BILATERAL SCIATICA: ICD-10-CM

## 2021-04-29 DIAGNOSIS — M54.42 CHRONIC BILATERAL LOW BACK PAIN WITH BILATERAL SCIATICA: ICD-10-CM

## 2021-04-29 DIAGNOSIS — M54.16 LUMBAR RADICULOPATHY: ICD-10-CM

## 2021-04-29 RX ORDER — GABAPENTIN 300 MG/1
CAPSULE ORAL
Qty: 270 CAPSULE | Refills: 1 | Status: SHIPPED | OUTPATIENT
Start: 2021-04-29 | End: 2021-12-14

## 2021-05-12 ENCOUNTER — HOSPITAL ENCOUNTER (OUTPATIENT)
Dept: PHYSICAL MEDICINE AND REHAB | Facility: CLINIC | Age: 81
Discharge: HOME | End: 2021-05-12
Attending: PHYSICAL MEDICINE & REHABILITATION

## 2021-05-12 DIAGNOSIS — M54.16 LUMBAR RADICULOPATHY: ICD-10-CM

## 2021-05-25 ENCOUNTER — HOSPITAL ENCOUNTER (OUTPATIENT)
Dept: PHYSICAL MEDICINE AND REHAB | Facility: CLINIC | Age: 81
Discharge: HOME | End: 2021-05-25
Attending: PHYSICAL MEDICINE & REHABILITATION

## 2021-05-25 DIAGNOSIS — M54.42 CHRONIC BILATERAL LOW BACK PAIN WITH BILATERAL SCIATICA: ICD-10-CM

## 2021-05-25 DIAGNOSIS — M54.41 CHRONIC BILATERAL LOW BACK PAIN WITH BILATERAL SCIATICA: ICD-10-CM

## 2021-05-25 DIAGNOSIS — M48.061 STENOSIS OF LATERAL RECESS OF LUMBAR SPINE: ICD-10-CM

## 2021-05-25 DIAGNOSIS — G89.29 CHRONIC BILATERAL LOW BACK PAIN WITH BILATERAL SCIATICA: ICD-10-CM

## 2021-05-25 ASSESSMENT — MIFFLIN-ST. JEOR: SCORE: 1390.31

## 2021-05-27 NOTE — TELEPHONE ENCOUNTER
RN cannot approve Refill Request    RN can NOT refill this medication Protocol failed and NO refill given. Last office visit: 1/31/2019 Merlin Higuera MD Last Physical: 6/19/2018 Last MTM visit: Visit date not found Last visit same specialty: 1/31/2019 Merlin Higuera MD.  Next visit within 3 mo: Visit date not found  Next physical within 3 mo: Visit date not found  Last refill:  6/26/18      Hawa Patel, [facility] Triage/Med Refill 4/2/2019    Requested Prescriptions   Pending Prescriptions Disp Refills     metFORMIN (GLUCOPHAGE) 500 MG tablet [Pharmacy Med Name: METFORMIN 500MG TABLETS] 90 tablet 2     Sig: Take 1 tablet (500 mg total) by mouth daily. As directed    Metformin Refill Protocol Failed - 4/2/2019 12:28 PM       Failed - Microalbumin in last year     Microalbumin, Random Urine   Date Value Ref Range Status   11/30/2017 0.89 0.00 - 1.99 mg/dL Final                 Passed - Blood pressure in last 12 months    BP Readings from Last 1 Encounters:   01/31/19 134/70            Passed - LFT or AST or ALT in last 12 months    Albumin   Date Value Ref Range Status   06/19/2018 4.1 3.5 - 5.0 g/dL Final     Bilirubin, Total   Date Value Ref Range Status   06/19/2018 1.0 0.0 - 1.0 mg/dL Final     Bilirubin, Direct   Date Value Ref Range Status   06/18/2012 0.19 <0.31 mg/dL Final     Alkaline Phosphatase   Date Value Ref Range Status   06/19/2018 79 45 - 120 U/L Final     AST   Date Value Ref Range Status   06/19/2018 26 0 - 40 U/L Final     ALT   Date Value Ref Range Status   06/19/2018 27 0 - 45 U/L Final     Protein, Total   Date Value Ref Range Status   06/19/2018 7.3 6.0 - 8.0 g/dL Final               Passed - GFR or Serum Creatinine in last 6 months    GFR MDRD Non Af Amer   Date Value Ref Range Status   06/19/2018 54 (L) >60 mL/min/1.73m2 Final     GFR MDRD Af Amer   Date Value Ref Range Status   06/19/2018 >60 >60 mL/min/1.73m2 Final            Passed - Visit with PCP or  prescribing provider visit in last 6 months or next 3 months    Last office visit with prescriber/PCP: 1/31/2019 OR same dept: 1/31/2019 Merlin Higuera MD OR same specialty: 1/31/2019 Merlin Higuera MD Last physical: Visit date not found Last MTM visit: Visit date not found         Next appt within 3 mo: Visit date not found  Next physical within 3 mo: Visit date not found  Prescriber OR PCP: Merlin Higuera MD  Last diagnosis associated with med order: 1. Diabetes (H)  - metFORMIN (GLUCOPHAGE) 500 MG tablet [Pharmacy Med Name: METFORMIN 500MG TABLETS]; TAKE 1 TABLET BY MOUTH EVERY DAY AS DIRECTED  Dispense: 90 tablet; Refill: 0     If protocol passes may refill for 12 months if within 3 months of last provider visit (or a total of 15 months).          Passed - A1C in last 6 months    Hemoglobin A1c   Date Value Ref Range Status   01/31/2019 7.4 (H) 3.5 - 6.0 % Final

## 2021-05-28 NOTE — PROGRESS NOTES
Office Visit - Follow up    Anthony Evangelista   78 y.o. male    Date of Visit: 5/2/2019    Chief Complaint   Patient presents with     Diabetes     Hyperlipidemia       Subjective: Diabetes mellitus type 2 with hyperlipidemia.    Needs microalbumin today.  Last laboratory tests from January 31, 2019 reviewed.    Concern regarding possibility of underlying pancreatic cancer bladder cancer.  2 friends of had similar illnesses.  The patient has not lost weight there is no back pain or jaundice no cold colored urine or mary colored stools.  No abdominal pain with radiation to the back no nausea or vomiting or anorexia.    The patient denies any bladder symptomatology no gross hematuria he is a non-smoker.    Last night his blood sugar was 250 later on recheck 150.  Leg cramps while sleeping discussed also some sciatic discomfort left lower extremity better now ate a pretzel between his blood sugars.    No blood in stool or urine no chest pain or shortness of breath melena occasion list reviewed well-tolerated normal effects suggested aspirin 81 mg daily.    ROS: A comprehensive review of systems was performed and was otherwise negative    Medications:  Prior to Admission medications    Medication Sig Start Date End Date Taking? Authorizing Provider   melatonin 5 mg cap Take by mouth.   Yes PROVIDER, HISTORICAL   metFORMIN (GLUCOPHAGE) 500 MG tablet TAKE 1 TABLET BY MOUTH EVERY DAY AS DIRECTED 6/26/18  Yes Merlin Higuera MD   MV-MN/FA/VIT K/LYCOP/LUT/ZEAXA (OCUVITE EYE + MULTI ORAL) Take by mouth.   Yes PROVIDER, HISTORICAL   ONE TOUCH DELICA LANCETS MISC Use As Directed. Test twice daily. 2/18/11  Yes Merlin Higuera MD   simvastatin (ZOCOR) 40 MG tablet TAKE 1 TABLET BY MOUTH EVERY DAY 12/29/18  Yes Merlin Higuear MD   ACCU-CHEK COMPACT PLUS TEST Strp Use 1 strip As Directed 2 (two) times a day. 2/17/17   PROVIDER, HISTORICAL   metFORMIN (GLUCOPHAGE) 500 MG tablet Take 1 tablet (500 mg total) by mouth  daily. As directed 4/3/19 5/2/19  Merlin Higuera MD       Allergies: No Known Allergies    Immunizations:   Immunization History   Administered Date(s) Administered     DT (pediatric) 03/04/2004     Influenza high dose, seasonal 10/21/2015, 10/17/2016, 10/24/2017, 10/08/2018     Influenza, inj, historic,unspecified 11/06/2007, 10/07/2008, 09/22/2009, 10/08/2010, 10/28/2011, 10/22/2014     Influenza, seasonal,quad inj 6-35 mos 10/05/2012, 10/22/2013     Pneumo Conj 13-V (2010&after) 12/10/2015     Pneumo Polysac 23-V 10/25/2006     Td,adult,historic,unspecified 03/04/2004     Tdap 07/15/2013       Exam Chest clear to auscultation and percussion.  Heart tones regular rhythm without murmur rub or gallop.  Abdomen soft nontender no organomegaly.  No peritoneal signs.  Extremities free of edema cyanosis or clubbing.  Neck veins nondistended no thyromegaly or scleral icterus noted, carotids full.  Skin warm and dry easily conversant good spirited.  Normal intelligence.  Neurologically intact no gross localizing findings.  120/70 pulse 66 respirations 18 O2 sats 99% BMI was good at 25 weight down 1 pound from previous at 155.  No known drug allergies.    Assessment and Plan  Diabetes mellitus type 2 check A1c blood sugar lipid panel and urine for microalbumin today.    Hyperlipidemia by history no history of MI or stroke.    Anxiety neurosis increase exercise recommended.  Enjoys playing golf at [employer].  [facility].    Time: total time spent with the patient was 25 minutes of which >50% was spent in counseling and coordination of care        Merlin Higuera MD    Patient Active Problem List   Diagnosis     Acne Vulgaris     Chest Pain     Type 2 Diabetes Mellitus - Uncomplicated, Uncontrolled     Benign Prostatic Hypertrophy     Sciatica     Urine Tests Nonspecific Abnormal Findings     Hyperlipidemia     Hyperglycemia     General medical exam     Routine general medical examination  at a health care facility     Knee pain

## 2021-05-30 VITALS — BODY MASS INDEX: 24.64 KG/M2 | HEIGHT: 67 IN | WEIGHT: 157 LBS

## 2021-05-30 VITALS — WEIGHT: 162 LBS | BODY MASS INDEX: 25.43 KG/M2 | HEIGHT: 67 IN

## 2021-05-31 VITALS — BODY MASS INDEX: 23.86 KG/M2 | HEIGHT: 67 IN | WEIGHT: 152 LBS

## 2021-05-31 VITALS — WEIGHT: 158 LBS | HEIGHT: 67 IN | BODY MASS INDEX: 24.8 KG/M2

## 2021-05-31 VITALS — HEIGHT: 67 IN | BODY MASS INDEX: 24.33 KG/M2 | WEIGHT: 155 LBS

## 2021-05-31 VITALS — WEIGHT: 149 LBS | HEIGHT: 67 IN | BODY MASS INDEX: 23.39 KG/M2

## 2021-05-31 NOTE — PROGRESS NOTES
Assessment: pt here for diabetes management f/u and is accompanied by his wife. Dad had diabetes. Pt. was diagnosed 5 years ago. Has been watching his diet ever since and has managed to lose 20 lbs.Wants to make changes to his lifestyle so he doesn't have to go on insulin ever.  - pt arrived without his meter/BS log. Tests, 1x/wk. Per recall, BS are between 120--180. No lows  - takes 500 mg metformin daily with no side effects.  - Pretty active overall, ~ 1 hr/day. Walks/does some stretches for his sciatica almost daily. Golfs, a couple times/wk but takes a cart typically.   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 Discussed stress less tips and resources available.  - watches his diet closely and includes lots of veggies, whole grains, protein for balance. Likes fruit, yao watermelon and bananas.  - drinks, maybe 1 small bottle of water/d      Discussed diabetes basics, AIC and BS goals, complications of uncontrolled diabetes, general diet guidelines, CHO foods, portion control, sx and tx of hyper and hypoglycemia, concept of budgeting and balancing. Patient verbalized understanding.       Plan:   Test at least 1x/d  Monitor PO intake and limit carbs to 60-75 grams/meal  Do not skip meals  Drink more water  PA/exercise as able  Take meds as prescribed  Bring meter/BS to next appt  Call with any questions/concerns  F/u in ~ 2 months     Recommend metformin be titrated up to 1000 mg/d (pt reported no concerns with the current 500 mg daily). And then gradually to 1500 mg and then 2000 mg if A1C not at goal as long as GFR >30        Subjective and Objective:      Anthony Evangelista is referred by Dr. Higuera for Diabetes Education.     Lab Results   Component Value Date    HGBA1C 8.0 (H) 08/02/2019           Education:     Monitoring   Meter (per above goals): Assessed and Discussed  Monitoring: Assessed and Discussed  BG goals: Assessed, Discussed and Literature provided    Nutrition  Management  Nutrition Management: Assessed, Discussed and Literature provided  Weight: Assessed, Discussed and Literature provided  Portions/Balance: Assessed, Discussed and Literature provided  Carb ID/Count: Assessed, Discussed and Literature provided  Label Reading: Assessed, Discussed and Literature provided  Heart Healthy Fats: Assessed, Discussed and Literature provided  Menu Planning: Assessed, Discussed and Literature provided  Dining Out: Assessed, Discussed and Literature provided  Physical Activity: Assessed, Discussed and Literature provided  Medications: Assessed, Discussed and Literature provided  Orals: Assessed and Discussed  Injected Medications: Not addressed     Diabetes Disease Process: Assessed, Discussed and Literature provided    Acute Complications: Prevent, Detect, Treat:  Hypoglycemia: Assessed, Discussed and Literature provided  Hyperglycemia: Assessed, Discussed and Literature provided  Sick Days: Assessed, Discussed and Literature provided  Driving: Not addressed    Chronic Complications  Foot Care:Assessed and Discussed  Skin Care: Assessed and Discussed  Eye: Assessed and Discussed  ABC: Assessed, Discussed and Literature provided  Teeth:Assessed and Discussed  Goal Setting and Problem Solving: Assessed and Discussed  Barriers: Assessed and Discussed  Psychosocial Adjustments: Assessed      Time spent with the patient: 60 minutes for diabetes education and counseling.   Previous Education: yes  Visit Type:ROB Mixon  9/3/2019

## 2021-05-31 NOTE — PROGRESS NOTES
Annual wellness visit  Assessment and Plan:   Annual wellness visit    1. Routine general medical examination at a health care facility  Annual wellness visit  - [facility](CBC w/o Differential)  - Comprehensive Metabolic Panel  - Lipid Cascade  - Glycosylated Hemoglobin A1c  - Thyroid Stimulating Hormone (TSH)  - Urinalysis-UC if Indicated  - Microalbumin, Random Urine     The patient's current medical problems were reviewed.    I have had an Advance Directives discussion with the patient.  The following health maintenance schedule was reviewed with the patient and provided in printed form in the after visit summary:   Health Maintenance   Topic Date Due     ZOSTER VACCINES (1 of 2) 1991     MEDICARE ANNUAL WELLNESS VISIT  2019     DIABETES FOLLOW-UP  2019     INFLUENZA VACCINE RULE BASED (1) 2019     DIABETES OPHTHALMOLOGY EXAM  10/18/2019     DIABETES HEMOGLOBIN A1C  2019     DIABETES URINE MICROALBUMIN  2020     DIABETES FOOT EXAM  2020     FALL RISK ASSESSMENT  2020     ADVANCE CARE PLANNING  2023     TD 18+ HE  07/15/2023     PNEUMOCOCCAL POLYSACCHARIDE VACCINE AGE 65 AND OVER  Completed     PNEUMOCOCCAL CONJUGATE VACCINE FOR ADULTS (PCV13 OR PREVNAR)  Completed        Subjective:   Chief Complaint: Anthony Evangelista is an 78 y.o. male here for an Annual Wellness visit.   HPI: Annual wellness visit for this type II diabetic.  Non-smoker no excess alcohol.    Colonoscopy showed tubular adenomatous polyps 2 in number 2017 [facility]    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  The patient immunizations are reviewed and up-to-date Prevnar 13 has been administered.    History of diabetes mellitus type 2 with hyperlipidemia.    Bilateral hernia repair.    Appendectomy.    Tonsillectomy.    No prior problems with any anesthetics or anesthesia for any other prior surgeries.    Mother  cancer of the liver age  78.    Father  56 myocardial infarction.  One brother has been treated for cancer of the bladder.  One daughter  at age 51 thoracic aortic aneurysm was complications.  One son Rakesh with whom he owns the [employer] business has had diverticulitis also patient of this examiner.  Wife living and well previously treated for pneumonia by this examiner.    [email or web address].    Review of Systems:    Please see above.  The rest of the review of systems are negative for all systems.    Patient Care Team:  Merlin Higuera MD as PCP - General     Patient Active Problem List   Diagnosis     Acne Vulgaris     Chest Pain     Type 2 Diabetes Mellitus - Uncomplicated, Uncontrolled     Benign Prostatic Hypertrophy     Sciatica     Urine Tests Nonspecific Abnormal Findings     Hyperlipidemia     Hyperglycemia     General medical exam     Routine general medical examination at a health care facility     Knee pain     History reviewed. No pertinent past medical history.   Past Surgical History:   Procedure Laterality Date     SD APPENDECTOMY      Description: Appendectomy;  Recorded: 2008;      History reviewed. No pertinent family history.   Social History     Socioeconomic History     Marital status:      Spouse name: Not on file     Number of children: Not on file     Years of education: Not on file     Highest education level: Not on file   Occupational History     Not on file   Social Needs     Financial resource strain: Not on file     Food insecurity:     Worry: Not on file     Inability: Not on file     Transportation needs:     Medical: Not on file     Non-medical: Not on file   Tobacco Use     Smoking status: Never Smoker     Smokeless tobacco: Never Used   Substance and Sexual Activity     Alcohol use: No     Comment: rare     Drug use: No     Sexual activity: Not on file   Lifestyle     Physical activity:     Days per week: Not on file     Minutes per session: Not on file     Stress:  "Not on file   Relationships     Social connections:     Talks on phone: Not on file     Gets together: Not on file     Attends [employer] service: Not on file     Active member of club or organization: Not on file     Attends meetings of clubs or organizations: Not on file     Relationship status: Not on file     Intimate partner violence:     Fear of current or ex partner: Not on file     Emotionally abused: Not on file     Physically abused: Not on file     Forced sexual activity: Not on file   Other Topics Concern     Not on file   Social History Narrative     Not on file      Current Outpatient Medications   Medication Sig Dispense Refill     ACCU-CHEK COMPACT PLUS TEST Strp Use 1 strip As Directed 2 (two) times a day.  11     melatonin 5 mg cap Take by mouth.       metFORMIN (GLUCOPHAGE) 500 MG tablet TAKE 1 TABLET BY MOUTH EVERY DAY AS DIRECTED 90 tablet 0     MV-MN/FA/VIT K/LYCOP/LUT/ZEAXA (OCUVITE EYE + MULTI ORAL) Take by mouth.       ONE TOUCH DELICA LANCETS MISC Use As Directed. Test twice daily.       simvastatin (ZOCOR) 40 MG tablet TAKE 1 TABLET BY MOUTH EVERY DAY 90 tablet 2     No current facility-administered medications for this visit.       Objective:   Vital Signs:   Visit Vitals  /64 (Patient Site: Right Arm, Patient Position: Sitting)   Pulse 67   Ht 5' 6.5\" (1.689 m)   Wt 149 lb (67.6 kg)   SpO2 99%   BMI 23.69 kg/m         VisionScreening:  No exam data present     PHYSICAL EXAM  Chest clear to auscultation and percussion.  Heart tones regular rhythm without murmur rub or gallop.  Abdomen soft nontender no organomegaly.  No peritoneal signs.  Extremities free of edema cyanosis or clubbing.  Neck veins nondistended no thyromegaly or scleral icterus noted, carotids full.  Skin warm and dry easily conversant good spirited.  Normal intelligence.  Neurologically intact no gross localizing findings.  Skin negative lymph negative neuro negative psych normal HEENT negative back straight no " severe spine tenderness mild complaints of sciatica but not limiting.  Genital rectal exam negative except enlarged prostate.  Good pulse noted in all 4 extremities no carotid bruits.    Assessment Results 8/2/2019   Activities of Daily Living No help needed   Instrumental Activities of Daily Living No help needed   Get Up and Go Score -   Mini Cog Total Score 5   Some recent data might be hidden     A Mini-Cog score of 0-2 suggests the possibility of dementia, score of 3-5 suggests no dementia    Identified Health Risks:     Patient's advanced directive was discussed and I am comfortable with asymptomatic from the prostate standpoint there was no nodularity seen on the prostate examination.  The patient's wishes.

## 2021-05-31 NOTE — PATIENT INSTRUCTIONS - HE
Advance Directive  Patient s advance directive was discussed and I am comfortable with the patient s wishes.  Patient Education   Personalized Prevention Plan  You are due for the preventive services outlined below.  Your care team is available to assist you in scheduling these services.  If you have already completed any of these items, please share that information with your care team to update in your medical record.  Health Maintenance   Topic Date Due     ZOSTER VACCINES (1 of 2) 09/29/1991     MEDICARE ANNUAL WELLNESS VISIT  06/19/2019     DIABETES FOLLOW-UP  07/31/2019     INFLUENZA VACCINE RULE BASED (1) 08/01/2019     DIABETES OPHTHALMOLOGY EXAM  10/18/2019     DIABETES HEMOGLOBIN A1C  11/02/2019     DIABETES URINE MICROALBUMIN  05/02/2020     DIABETES FOOT EXAM  08/02/2020     FALL RISK ASSESSMENT  08/02/2020     ADVANCE CARE PLANNING  06/19/2023     TD 18+ HE  07/15/2023     PNEUMOCOCCAL POLYSACCHARIDE VACCINE AGE 65 AND OVER  Completed     PNEUMOCOCCAL CONJUGATE VACCINE FOR ADULTS (PCV13 OR PREVNAR)  Completed

## 2021-06-01 VITALS — WEIGHT: 152 LBS | HEIGHT: 67 IN | BODY MASS INDEX: 23.86 KG/M2

## 2021-06-01 NOTE — PATIENT INSTRUCTIONS - HE
An order for physical therapy has been provided today.  You can schedule physical therapy before you leave today or someone will call you to schedule physical therapy.  It will be very important for you to do your physical therapy exercises on a regular basis to decrease your pain and prevent future flares of pain.    Gabapentin (nerve pain medication) was prescribed today.  Please use the chart to increase the dose to an amount that controls your pain (do not exceed 3 tablets three times a day).  If you have any questions on how to increase the dose or any side effects to this medication, please call the clinic.    Gabapentin 100mg Dosing Chart    DATE  MORNING AFTERNOON BEDTIME    Day 1 0 0 1    Day 2 0 0 1    Day 3 0 0 1    Day 4 1 0 1    Day 5 1 0 1    Day 6 1 0 1    Day 7 1 1 1    Day 8 1 1 1    Day 9 1 1 1    Day 10 1 1 2    Day 11 1 1 2    Day 12 1 1 2    Day 13 2 1 2    Day 14 2 1 2    Day 15 2 1 2    Day 16 2 2 2    Day 17 2 2 2    Day 18 2 2 2    Day 19 2 2 3    Day 20 2 2 3    Day 21 2 2 3    Day 22 3 2 3    Day 23 3 2 3    Day 24 3 2 3    Day 25 3 3 3    Day 26 3 3 3    Day 27 3 3 3     Continue medication, taking 3 capsules three times daily    Please call if you have any questions regarding how to take your medication  Clinic Phone # [phone]    A nurse will call you in 4 weeks to see how you are doing.  If you are doing better at that time, you are welcome to follow-up as needed.  If you are not doing better, the nurse will relay this information to the physician and then further recommendations can be made. Please do not hesitate to contact the clinic at [phone] if you have any questions/concerns or any worsening of your pain prior to that time.  You are also welcome to contact Dr. Kathleen via [facility].

## 2021-06-01 NOTE — PROGRESS NOTES
ASSESSMENT: Anthony Evangelista is a 78 y.o. male  with a BMI of 24.88 with past medical history significant for type 2 diabetes, benign prostatic hypertrophy, hyperlipidemia who presents today for new patient evaluation of acute left-sided low back pain with left radicular/sciatic type leg pain.  The patient's symptoms have been present for over 5 years, but worsened over the last few weeks.  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  If this is largely in part secondary to lumbar facet arthropathy.  The patient has a diminished left Achilles reflex, but is otherwise neurologically intact and without any red flag symptoms.    KASHIF: 14%  WHO-5: 14 (the patient is not interested in behavioral health services)    PLAN:  A shared decision making model was used.  The patient's values and choices were respected.  The following represents what was discussed and decided upon by the physician and the patient.      1.  DIAGNOSTIC TESTS: The patient's MRI of the lumbar spine from July 2013 was personally reviewed today by the physician with the physician performing her own interpretation.  The patient does have a subtle retrolisthesis at the L5-S1 level.  There is left greater than right foraminal stenosis seen at the L5-S1 level with right greater than left lateral recess stenosis seen.  Patient does have left foraminal stenosis seen at the L3-4, L4-5, L5-S1 levels, worse at the L5-S1 level.  There is significant central canal and bilateral lateral recess stenosis at the L4-5 level.  There is more mild to moderate central canal and lateral recess stenosis seen at the L3-4 level.  There is facet arthropathy seen throughout the entire lumbar spine.  Please refer to the report for details.  The images were shown to the patient and the findings were explained using a spine model.  2.  PHYSICAL THERAPY: He was interested in returning  to physical therapy as he has had good relief with physical therapy in the past.  An order was provided to [facility], as he states that this is very convenient for him as it is very close to his home.  He is encouraged to do his exercise program on a regular basis.  3.  MEDICATIONS: Discussed Gabapentin/Neurontin (nerve pain medication) mechanism of action as well as potential side effects (drowsiness/dizziness) with the patient.  The patient wanted to try this medication so Gabapentin 100 mg was prescribed.  A chart was given with how to increase the dose to a maximum of 3 tablets three times a day.  The lowest therapeutic dose should be used.   The patient is asked to call the clinic if there are any side effects to the Gabapentin or if questions arise as to how to increase the dose.  4.  INTERVENTIONS: No injections were discussed today.  Can consider injections in the future if the patient fails to have relief with physical therapy.  An updated MRI would potentially be helpful.  5.  PATIENT EDUCATION:   -The patient's questions regarding diagnosis were discussed today.  Discussed that his symptoms in the leg including going into the foot are likely from the findings of the low back.  Explained that symptoms related to diabetes are typically bilateral and not just unilateral.  -All of his questions were answered to his satisfaction today.  He was in agreement with the treatment plan.  6.  FOLLOW-UP:   The nurse navigation team is asked to call him in 4 weeks.  If he is doing better at that time he is welcome to follow-up as needed.  If he is not doing better, nurse navigation is asked to order an MRI of the lumbar spine without contrast dye (diagnosis acute left-sided low back pain with left radicular/sciatic type leg pain of over 6 weeks duration).  And the patient should make a follow-up appointment to review the MRI results.  If there are any questions/concerns or any significant worsening of pain  prior to that time, the patient is asked to call the clinic via the nurse navigation line or via [employer].       SUBJECTIVE:  Anthony Evangelista  Is a 78 y.o. male who presents today for new patient evaluation of low back pain and left radicular leg pain.  The patient was last seen in this clinic back in 2013.  He reports that he was referred to physical therapy and his pain significantly improved.  He stated that he has done well until a few weeks ago when pain started to return.  His pain currently feels the same or similar to how it did back in 2013.  The patient reports he has pain in the left low back and left buttock.  He then gets pain going into the left posterior thigh and he has noticed some pain in the bottom of the left heel.  He does not notice any weakness.  No significant numbness or tingling.  No significant pain in the right side.  The patient's pain is worse with walking for long distances.  He tries to walk 10,000 steps 3 days a week.  He notices that by the time he is getting to 9 or 10,000 steps, he is having increased pain.  He reports that after golfing, in the last few hours of golf he would have increased pain.  He does feel better with doing his physical therapy exercises.  He admits that he has not been doing these as diligently as he should.  He was doing them regularly and then because he felt better he drop back to just doing them twice a week or so.  Since his back started hurting he has not really been doing them because of the pain.  He is not taking any medications for the pain.  He has not had any injections or any history of surgery.    Medications:  Reviewed and correct in the chart.      Allergies: Reviewed and NKDA per patient.    PMH:  Reviewed and significant for type 2 diabetes, benign prostatic hypertrophy, hyperlipidemia.    PSH:  Reviewed and significant for hernia repair x2, tonsillectomy, appendectomy.    Family History:  Reviewed and significant for liver cancer in his  mother, diabetes in his father, heart disease in his father, multiple sclerosis in his mother.    Social History: Patient is  and self-employed.  He does not have to do any heavy lifting.  He drinks alcohol maybe once a month.  He denies any tobacco or illicit drug use.    ROS: Positive for joint pain, muscle pain, sciatica, tiredness (the patient reports that he does not sleep well secondary to stress and to his brain constantly thinking about things).   Specifically negative for bowel/bladder dysfunction, fevers,chills, appetite changes, unexplained weight loss.  Otherwise 13 systems reviewed are negative.  Please see the patient's intake questionnaire from today for details.      OBJECTIVE:  PHYSICAL EXAMINATION:    CONSTITUTIONAL:  Vital signs as above.  No acute distress.  The patient is well nourished and well groomed.  PSYCHIATRIC:  The patient is awake, alert, oriented to person, place, time and answering questions appropriately with clear speech.    SKIN:  Skin over the face, bilateral lower extremities, and posterior torso is clean, dry, intact without rashes.   Patient does have some peeling dry skin over the bottoms of both feet.  GAIT:  Gait is non-antalgic.  The patient is able to heel and toe walk without significant difficulty.    STANDING EXAMINATION: The patient has some mild tenderness of the left lower lumbar paraspinal muscles into the left gluteal muscle.  No tenderness over the structures on the right side.  Patient does not have any pain with lumbar flexion.  He has mild restricted range of motion with lumbar flexion.  Patient does report pain in the left buttock with lumbar extension and he has mild to moderate restriction with lumbar extension.  No significant pain with bilateral lumbar sidebending.  He does report some mild increase in pain with left lumbar rotation, but no significant pain with right lumbar rotation.  No major restricted range of motion with lumbar  rotation.  MUSCLE STRENGTH:  The patient has 5/5 strength for the bilateral hip flexors, knee flexors/extensors, ankle dorsiflexors/plantar flexors, great toe extensors, ankle evertors/invertors.  NEUROLOGICAL: 2+/4 patellar and medial hamstring reflexes bilaterally.  Patient is a 2/4 right Achilles reflex but a diminished (though present) left Achilles reflex.  Equivocal Babinski's bilaterally.  No ankle clonus bilaterally. Sensation to light touch is intact in the bilateral L4, L5, and S1 dermatomes.  VASCULAR:  2/4 dorsalis pedis pulses bilaterally.  Bilateral lower extremities are warm.  There is no pitting edema of the bilateral lower extremities.  ABDOMINAL:  Soft, non-distended, non-tender throughout all quadrants.  No pulsatile mass palpated in the left lower quadrant.  LYMPH NODES:  No palpable or tender inguinal/popliteal lymph nodes.  MUSCULOSKELETAL: It is straight leg raising test both supine and seated bilaterally.  The patient is not have any significant pain with hip range of motion testing bilaterally, with the hips in flexed position testing internal/external rotation.  Fabere's test is negative bilaterally.

## 2021-06-02 VITALS — BODY MASS INDEX: 24.17 KG/M2 | HEIGHT: 67 IN | WEIGHT: 154 LBS

## 2021-06-02 VITALS — BODY MASS INDEX: 24.33 KG/M2 | HEIGHT: 67 IN | WEIGHT: 155 LBS

## 2021-06-02 VITALS — WEIGHT: 156 LBS | HEIGHT: 67 IN | BODY MASS INDEX: 24.48 KG/M2

## 2021-06-02 NOTE — TELEPHONE ENCOUNTER
Follow up call placed to check on status 4 weeks after new patient consultation appointment. Left message to return call.

## 2021-06-02 NOTE — TELEPHONE ENCOUNTER
"Pt returned call. Pt states \"I'm doing ok. I'd like to give PT another month and see how that goes. Could you check in with me then?\" Informed pt we will call him in 1 month to see how he's doing. Reminder task created.     He also reports he is on gabapentin 100mg 3-3-3 without any side effects. He will continue with this.   "

## 2021-06-03 ENCOUNTER — RECORDS - HEALTHEAST (OUTPATIENT)
Dept: ADMINISTRATIVE | Facility: OTHER | Age: 81
End: 2021-06-03

## 2021-06-03 VITALS — BODY MASS INDEX: 23.39 KG/M2 | WEIGHT: 149 LBS | HEIGHT: 67 IN

## 2021-06-03 VITALS
HEIGHT: 67 IN | DIASTOLIC BLOOD PRESSURE: 70 MMHG | WEIGHT: 152 LBS | SYSTOLIC BLOOD PRESSURE: 124 MMHG | TEMPERATURE: 97.6 F | OXYGEN SATURATION: 98 % | BODY MASS INDEX: 23.86 KG/M2 | HEART RATE: 78 BPM

## 2021-06-03 VITALS — HEIGHT: 67 IN | BODY MASS INDEX: 24.56 KG/M2 | WEIGHT: 156.5 LBS

## 2021-06-03 LAB — RETINOPATHY: NEGATIVE

## 2021-06-03 NOTE — TELEPHONE ENCOUNTER
Anthony complaining of cold symptoms since yesterday, runny nose. No breathing concerns. Wants a pill to make his symptoms go away so it doesn't drag on for a week.      No fever, no breathing concerns. Reports he is pretty healthy otherwise.     Discussed supportive measures of fluids, rest, healthy nutrition, regular sleep. Encouraged him to monitor for any breathing concerns and let us know if any develop. He verbalized understanding.

## 2021-06-03 NOTE — TELEPHONE ENCOUNTER
1 month follow-up call placed to pt to check on status after PT. Left message for pt to return call.

## 2021-06-04 VITALS
BODY MASS INDEX: 24.64 KG/M2 | HEART RATE: 76 BPM | OXYGEN SATURATION: 98 % | DIASTOLIC BLOOD PRESSURE: 72 MMHG | WEIGHT: 157 LBS | HEIGHT: 67 IN | SYSTOLIC BLOOD PRESSURE: 134 MMHG

## 2021-06-04 NOTE — PROGRESS NOTES
Office Visit - Follow up    Anthony Evangelista   79 y.o. male    Date of Visit: 12/6/2019    Chief Complaint   Patient presents with     Cough     since Saturday     Hyperlipidemia     Diabetes       Subjective: Diabetes mellitus type 2 with cough.    No appetite weak for the last 5 days started azithromycin form of Z-Leonides Monday last.  Last laboratory tests August 2, 2019.    Symptoms of cough upper respiratory infection and sinus congestion started this past weekend.  Feels weak hard to sleep at night restless.  Postnasal drip and drainage.  Z-Leonides seems to be helping.  Tried Mucinex no benefit.  Cough is better no hemoptysis.    No blood in stool or urine medication list reviewed reconciled.    Colonoscopy dated November 17, 2017 showed 2 tubular adenomatous colon polyps.  History of hyperlipidemia without target organ damage related to same.  No history of MI or stroke.    No known drug allergies.    ROS: A comprehensive review of systems was performed and was otherwise negative    Medications:  Prior to Admission medications    Medication Sig Start Date End Date Taking? Authorizing Provider   ACCU-CHEK COMPACT PLUS TEST Strp Use 1 strip As Directed 2 (two) times a day. 2/17/17  Yes PROVIDER, HISTORICAL   metFORMIN (GLUCOPHAGE) 500 MG tablet Take 1 tablet (500 mg total) by mouth 2 (two) times a day with meals. 9/3/19  Yes Merlin Higuera MD   ui-ck-BD-vit N-ielua-tvaq-zeax (OCUVITE EYE PLUS MULTI) 200- mcg Tab Take 1 tablet by mouth daily. 9/3/19  Yes Merlin Higuera MD   ONE TOUCH DELICA LANCETS MISC Use As Directed. Test twice daily. 2/18/11  Yes Merlin Higuera MD   simvastatin (ZOCOR) 40 MG tablet TAKE 1 TABLET BY MOUTH EVERY DAY 12/29/18  Yes Merlin Higuera MD   azithromycin (ZITHROMAX Z-LEONIDES) 250 MG tablet Take 2 tablets (500 mg) on  Day 1,  followed by 1 tablet (250 mg) once daily on Days 2 through 5. 12/2/19 12/7/19  Merlin Higuera MD   gabapentin (NEURONTIN) 100 MG capsule  Increase as instructed to 3 capsules  3 times a day. 9/19/19   Beatriz Land,    melatonin 5 mg cap Take by mouth.    PROVIDER, HISTORICAL   simvastatin (ZOCOR) 40 MG tablet TAKE 1 TABLET BY MOUTH EVERY DAY 10/8/19 12/6/19  Merlin Higuera MD       Allergies: No Known Allergies    Immunizations:   Immunization History   Administered Date(s) Administered     DT (pediatric) 03/04/2004     Influenza high dose,seasonal,PF, 65+ yrs 10/21/2015, 10/17/2016, 10/24/2017, 10/08/2018, 10/15/2019     Influenza, inj, historic,unspecified 11/06/2007, 10/07/2008, 09/22/2009, 10/08/2010, 10/28/2011, 10/22/2014     Influenza, seasonal,quad inj 6-35 mos 10/05/2012, 10/22/2013     Pneumo Conj 13-V (2010&after) 12/10/2015     Pneumo Polysac 23-V 10/25/2006     Td,adult,historic,unspecified 03/04/2004     Tdap 07/15/2013       Exam Chest clear to auscultation and percussion.  Heart tones regular rhythm without murmur rub or gallop.  Abdomen soft nontender no organomegaly.  No peritoneal signs.  Extremities free of edema cyanosis or clubbing.  Neck veins nondistended no thyromegaly or scleral icterus noted, carotids full.  Skin warm and dry easily conversant good spirited.  Normal intelligence.  Neurologically intact no gross localizing findings.    124/70 pulse 78 respirations 18 O2 sats 98% temperature 97.6 degrees.  Weight down 4 pounds from previous.  Current weight 152 pounds.  BMI only 24+.    Assessment and Plan  Diabetes mellitus type 2 related to insulin resistance.  BMI is excellent.  Check A1c blood sugar lipid panel today stable.    Sinus infection with cough check chest x-ray today completing Z-Leonides.  Slightly improved.    Hyperlipidemia on statin therapy and no history of MI or stroke no history of xanthoma or xanthelasma continue statin medication.    Tubular adenomatous colon polyp see colonoscopy report November 17, 2017.  Needs repeat colonoscopy in followed by 2022 for follow-up of tubular  adenomatous colon polyps.    Time: total time spent with the patient was 25 minutes of which >50% was spent in counseling and coordination of care        Merlin Higuera MD    Patient Active Problem List   Diagnosis     Acne Vulgaris     Chest Pain     Type 2 Diabetes Mellitus - Uncomplicated, Uncontrolled     Benign Prostatic Hypertrophy     Sciatica     Urine Tests Nonspecific Abnormal Findings     Hyperlipidemia     Hyperglycemia     General medical exam     Routine general medical examination at a health care facility     Knee pain

## 2021-06-04 NOTE — TELEPHONE ENCOUNTER
No response from pt. Second attempt placed. Reached pt's wife, Lisa. Consent to communicate in chart. She will tell pt to give [facility] a call back when he is able too. Gave her the nurse navigation line #.

## 2021-06-05 VITALS
OXYGEN SATURATION: 95 % | HEIGHT: 67 IN | HEART RATE: 74 BPM | WEIGHT: 154 LBS | SYSTOLIC BLOOD PRESSURE: 134 MMHG | DIASTOLIC BLOOD PRESSURE: 68 MMHG | TEMPERATURE: 97 F | BODY MASS INDEX: 24.17 KG/M2

## 2021-06-05 NOTE — TELEPHONE ENCOUNTER
Medication Question or Clarification  Who is calling: patient   What medication are you calling about (include dose and sig)?:   ACCU-CHEK COMPACT PLUS TEST Strp 100 strip 11 1/10/2020     Sig - Route: Use 1 strip As Directed 2 (two) times a day. - Miscellaneous    Class: OTC    Who prescribed the medication?: Merlin Higuera MD  What is your question/concern?: Patient states Accu-Chek Compact Plus Test strp prescription  was not send to [facility] . Patient requesting to send electronically to pharmacy .   Requested Pharmacy: [facility] # [id number]  Okay to leave a detailed message?: No

## 2021-06-05 NOTE — TELEPHONE ENCOUNTER
Refill Request  Did you contact pharmacy: No  Medication name:   Requested Prescriptions     Pending Prescriptions Disp Refills     ACCU-CHEK COMPACT PLUS TEST Strp 100 strip 11     Sig: Use 1 strip As Directed 2 (two) times a day.     Who prescribed the medication: Merlin Higuera MD   Requested Pharmacy: [facility] #[id number]  Is patient out of medication: n/a  Patient notified refills processed in 3 business days:  yes  Okay to leave a detailed message: yes

## 2021-06-05 NOTE — PROGRESS NOTES
Office Visit - Follow up    Anthony Evangelista   79 y.o. male    Date of Visit: 1/10/2020    Chief Complaint   Patient presents with     Cough     one month follow up     Sciatica     left     Diabetes       Subjective: Diabetes mellitus type 2 with history of pneumonia seen on chest x-ray bronchopneumonia.  Cough and sinus congestion better.  Last laboratory test done December 6, 2019 reviewed.    When he walks a lot he notices sciatica flare left side lower extremity.  Concerns regarding bowel movements.  Discussed irritable bowel syndrome suggest Metamucil heaping tablespoon daily.  Colonoscopy last done November 17, 2017 showing 2 tubular adenomatous polyps I did recommend repeat colonoscopy at the end of calendar year 2020.  His last colonoscopy done [facility].    No known drug allergies no blood in stool or urine medication list reviewed reconciled in the chart.    Non-smoker tries to exercise by walking primarily enjoys playing golf.  The patient does not abuse alcohol.    ROS: A comprehensive review of systems was performed and was otherwise negative    Medications:  Prior to Admission medications    Medication Sig Start Date End Date Taking? Authorizing Provider   ACCU-CHEK COMPACT PLUS TEST Strp Use 1 strip As Directed 2 (two) times a day. 2/17/17  Yes PROVIDER, HISTORICAL   gabapentin (NEURONTIN) 300 MG capsule Take 1 capsule (300 mg total) by mouth 3 (three) times a day. 12/6/19  Yes Beatriz Land, DO   metFORMIN (GLUCOPHAGE) 500 MG tablet Take 1 tablet (500 mg total) by mouth 2 (two) times a day with meals. 9/3/19  Yes Merlin Higuera MD   xr-yc-FS-vit N-smsrf-lhoz-zeax (OCUVITE EYE PLUS MULTI) 200- mcg Tab Take 1 tablet by mouth daily. 9/3/19  Yes Merlin Higuera MD   ONE TOUCH DELICA LANCETS MISC Use As Directed. Test twice daily. 2/18/11  Yes Merlin Higuera MD   simvastatin (ZOCOR) 40 MG tablet TAKE 1 TABLET BY MOUTH EVERY DAY 12/29/18  Yes  Merlin Higuera MD   melatonin 5 mg cap Take by mouth.    PROVIDER, HISTORICAL       Allergies: No Known Allergies    Immunizations:   Immunization History   Administered Date(s) Administered     DT (pediatric) 03/04/2004     Influenza high dose,seasonal,PF, 65+ yrs 10/21/2015, 10/17/2016, 10/24/2017, 10/08/2018, 10/15/2019     Influenza, inj, historic,unspecified 11/06/2007, 10/07/2008, 09/22/2009, 10/08/2010, 10/28/2011, 10/22/2014     Influenza, seasonal,quad inj 6-35 mos 10/05/2012, 10/22/2013     Pneumo Conj 13-V (2010&after) 12/10/2015     Pneumo Polysac 23-V 10/25/2006     Td,adult,historic,unspecified 03/04/2004     Tdap 07/15/2013       Exam Chest clear to auscultation and percussion.  Heart tones regular rhythm without murmur rub or gallop.  Abdomen soft nontender no organomegaly.  No peritoneal signs.  Extremities free of edema cyanosis or clubbing.  Neck veins nondistended no thyromegaly or scleral icterus noted, carotids full.  Skin warm and dry easily conversant good spirited.  Normal intelligence.  Neurologically intact no gross localizing findings.  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    134/72 pulse 76 respirations 18 O2 sats 98% BMI is ideal at 25 weight up 5 pounds.  The patient anticipates travel to [place] for at least 1 week at the end of this month January 2020.  His brother lives there in [place] he will visit him.    Assessment and Plan  Diabetes mellitus type 2 last labs done December 6, 2019 we will repeat in 6 months time.  Pneumonia resolved after course of Z-Leonides.    Irritable bowel syndrome Metamucil advised.    Sciatica encourage daily exercise and core muscle strengthening.    Tubular adenomatous colon polyp see colonoscopy report November 17, 2017 suggest repeat colonoscopy end of calendar year 2020.    Time: total time spent with the patient was 25 minutes of which >50% was spent in counseling and coordination of care        Merlin Higuera MD    Patient Active Problem  List   Diagnosis     Acne Vulgaris     Chest Pain     Type 2 Diabetes Mellitus - Uncomplicated, Uncontrolled     Benign Prostatic Hypertrophy     Sciatica     Urine Tests Nonspecific Abnormal Findings     Hyperlipidemia     Hyperglycemia     General medical exam     Routine general medical examination at a health care facility     Knee pain

## 2021-06-05 NOTE — TELEPHONE ENCOUNTER
Refill Approved    Rx renewed per Medication Renewal Policy. Medication was last renewed on 12/29/18    Adilene Brown, [facility] Triage/Med Refill 1/10/2020     Requested Prescriptions   Pending Prescriptions Disp Refills     simvastatin (ZOCOR) 40 MG tablet [Pharmacy Med Name: SIMVASTATIN 40MG TABLETS] 90 tablet 2     Sig: TAKE 1 TABLET BY MOUTH EVERY DAY       Statins Refill Protocol (Hmg CoA Reductase Inhibitors) Passed - 1/10/2020 11:07 AM        Passed - PCP or prescribing provider visit in past 12 months      Last office visit with prescriber/PCP: 1/10/2020 Merlin Higuera MD OR same dept: 1/10/2020 Merlin Higuera MD OR same specialty: 1/10/2020 Merlin Higuera MD  Last physical: 6/19/2018 Last MTM visit: Visit date not found   Next visit within 3 mo: Visit date not found  Next physical within 3 mo: Visit date not found  Prescriber OR PCP: Merlin Higuera MD  Last diagnosis associated with med order: 1. Diabetes (H)  - simvastatin (ZOCOR) 40 MG tablet [Pharmacy Med Name: SIMVASTATIN 40MG TABLETS]; TAKE 1 TABLET BY MOUTH EVERY DAY  Dispense: 90 tablet; Refill: 2    If protocol passes may refill for 12 months if within 3 months of last provider visit (or a total of 15 months).

## 2021-06-08 NOTE — PROGRESS NOTES
Office Visit - Follow up    Anthony Evangelista   76 y.o. male    Date of Visit: 1/9/2017    Chief Complaint   Patient presents with     Urinary Frequency     burning       Subjective: UTI.  Burning with urination and frequency no blood in urine no fever chills or backache.  2 weeks' duration prior history of BPH.    Previously seen at [facility] urology.    Colonoscopy normal on January 26, 2011 colorectal surgery group presiding.    No fever chills night sweats chest pain or shortness of breath.  Denies blood in stool or urine.    Medication list reviewed generally well-tolerated no known drug allergies.    ROS: A comprehensive review of systems was performed and was otherwise negative    Medications:  Prior to Admission medications    Medication Sig Start Date End Date Taking? Authorizing Provider   blood sugar diagnostic (ONE TOUCH ULTRA TEST) Strp Use As Directed. One touch ultra blue in vitro strip - use as directed. 2/18/11  Yes Merlin Higuera MD   metFORMIN (GLUCOPHAGE) 500 MG tablet TAKE 1 TABLET BY MOUTH EVERY DAY AS DIRECTED 12/16/16  Yes Merlin Higuera MD   MV-MN/FA/VIT K/LYCOP/LUT/ZEAXA (OCUVITE EYE + MULTI ORAL) Take by mouth.   Yes PROVIDER, HISTORICAL   simvastatin (ZOCOR) 40 MG tablet TAKE 1 TABLET BY MOUTH EVERY DAY 12/16/16  Yes Merlin Higuera MD   ONE TOUCH DELICA LANCETS MISC Use As Directed. Test twice daily. 2/18/11   Merlin Higuera MD       Allergies: No Known Allergies    Immunizations:   Immunization History   Administered Date(s) Administered     DT (pediatric) 03/04/2004     Influenza high dose, seasonal 10/21/2015, 10/17/2016     Influenza, inj, historic 11/06/2007, 10/07/2008, 09/22/2009, 10/08/2010, 10/28/2011, 10/22/2014     Influenza, seasonal,quad inj 6-35 mos 10/05/2012, 10/22/2013     Pneumo Conj 13-V (2010&after) 12/10/2015     Pneumo Polysac 23-V 10/25/2006     Td, historic 03/04/2004     Tdap 07/15/2013       Exam Chest clear to auscultation and percussion.  Heart  tones regular rhythm without murmur rub or gallop.  Abdomen soft nontender no organomegaly.  No peritoneal signs.  Extremities free of edema cyanosis or clubbing.  Neck veins nondistended no thyromegaly or scleral icterus noted, carotids full.  Skin warm and dry easily conversant good spirited.  Normal intelligence.  Neurologically intact no gross localizing findings.  Genital rectal examination negative.  Enlarged prostate without nodularity nothing to suggest malignancy.  Testicular scrotal exam negative suggestion of perhaps a small left groin hernia.  Reducible.    Assessment and Plan   UTI check [facility] consultation with Dr. ROWE.    Ciprofloxacin 500 mg twice a day for 10 day period.  Check UA plus PSA and diabetes mellitus type 2 check A1c plus lipid panel fasting blood sugar and urine for microalbumin today.        Time: total time spent with the patient was 40 minutes of which >50% was spent in counseling and coordination of care    The following high BMI interventions were performed this visit: encouragement to exercise    Merlin Higuera MD    Patient Active Problem List   Diagnosis     Acne Vulgaris     Chest Pain     Type 2 Diabetes Mellitus - Uncomplicated, Uncontrolled     Benign Prostatic Hypertrophy     Sciatica     Urine Tests Nonspecific Abnormal Findings     Hyperlipidemia     Hyperglycemia     General medical exam     Routine general medical examination at a health care facility     Knee pain

## 2021-06-09 NOTE — PROGRESS NOTES
"Anthony Evangelista is a 79 y.o. male who is being evaluated via a billable telephone visit.      The patient has been notified of following:     \"This telephone visit will be conducted via a call between you and your physician/provider. We have found that certain health care needs can be provided without the need for a physical exam.  This service lets us provide the care you need with a short phone conversation.  If a prescription is necessary we can send it directly to your pharmacy.  If lab work is needed we can place an order for that and you can then stop by our lab to have the test done at a later time.    Telephone visits are billed at different rates depending on your insurance coverage. During this emergency period, for some insurers they may be billed the same as an in-person visit.  Please reach out to your insurance provider with any questions.    If during the course of the call the physician/provider feels a telephone visit is not appropriate, you will not be charged for this service.\"    Patient has given verbal consent to a Telephone visit? Yes    What phone number would you like to be contacted at? [phone]    Patient would like to receive their AVS by AVS Preference: Ion.    Additional provider notes: Diabetes mellitus type 2.  Labs due including A1c blood sugar lipid panel and urine for microalbumin.    Latest blood sugar is 120 and 116 and 130.    No chest pain or shortness of breath [place] without chest pain.  No blood in stool or urine.    Medication list reviewed well-tolerated normal effects reconciled in the chart.    Assessment/Plan:  Diabetes mellitus type 2 check A1c blood sugar lipid panel urine for microalbumin today.  Call in 1 month's time regarding status.      Phone call duration:  11 minutes  "

## 2021-06-09 NOTE — PROGRESS NOTES
Office Visit - Follow up    Anthony Evangelista   76 y.o. male    Date of Visit: 3/23/2017    Chief Complaint   Patient presents with     Follow-up     Seen by Dr Ruelas at [facility] on 3/2/17. States they state he remains unchanged.        Subjective: Diabetes mellitus type 2.  Also urologic follow-up with [facility] urology Dr. Gibbs can either March 2, 2017.  Lab tests are pending.  Patient has had prior kidney problems in terms of chronic kidney disease BMP is also pending today with hemoglobin level.  No blood in stool or urine no chest pain or shortness of breath med list reviewed generally well-tolerated.    Indication list reviewed generally well-tolerated.  No known drug allergies no chest pain or shortness of breath.  No exertional syncope.  Added stress with work environment cells and all wounds party favor [employer].  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  Lost his daughter from pulmonary hemorrhage 18 months prior.  She was 5152 years of age.    ROS: A comprehensive review of systems was performed and was otherwise negative    Medications:  Prior to Admission medications    Medication Sig Start Date End Date Taking? Authorizing Provider   ACCU-CHEK COMPACT PLUS TEST Strp Use 1 strip As Directed 2 (two) times a day. 2/17/17  Yes PROVIDER, HISTORICAL   metFORMIN (GLUCOPHAGE) 500 MG tablet TAKE 1 TABLET BY MOUTH EVERY DAY AS DIRECTED 3/17/17  Yes Merlin Higuera MD   MV-MN/FA/VIT K/LYCOP/LUT/ZEAXA (OCUVITE EYE + MULTI ORAL) Take by mouth.   Yes PROVIDER, HISTORICAL   ONE TOUCH DELICA LANCETS MISC Use As Directed. Test twice daily. 2/18/11  Yes Merlin Higuera MD   simvastatin (ZOCOR) 40 MG tablet TAKE 1 TABLET BY MOUTH EVERY DAY 3/17/17  Yes Merlin Higuera MD   blood glucose test strips Use 1 each As Directed 2 (two) times a day as needed. Dispense brand per patient's insurance at pharmacy discretion. 2/16/17 3/23/17 Yes Merlin Higuera MD       Allergies: No Known  Allergies    Immunizations:   Immunization History   Administered Date(s) Administered     DT (pediatric) 03/04/2004     Influenza high dose, seasonal 10/21/2015, 10/17/2016     Influenza, inj, historic 11/06/2007, 10/07/2008, 09/22/2009, 10/08/2010, 10/28/2011, 10/22/2014     Influenza, seasonal,quad inj 6-35 mos 10/05/2012, 10/22/2013     Pneumo Conj 13-V (2010&after) 12/10/2015     Pneumo Polysac 23-V 10/25/2006     Td, historic 03/04/2004     Tdap 07/15/2013       Exam Chest clear to auscultation and percussion.  Heart tones regular rhythm without murmur rub or gallop.  Abdomen soft nontender no organomegaly.  No peritoneal signs.  Extremities free of edema cyanosis or clubbing.  Neck veins nondistended no thyromegaly or scleral icterus noted, carotids full.  Skin warm and dry easily conversant good spirited.  Normal intelligence.  Neurologically intact no gross localizing findings.    Assessment and Plan  Diabetes mellitus type 2 check hemoglobin plus basic metabolic profile lipid panel A1c TSH urinalysis and blood sugar today.    Obesity discussed.    Hyperlipidemia on statin therapy.    Recent urologic workup negative per Dr. Sai pires either [facility].    Hyperlipidemia on statin therapy no history of MI or stroke.    Time: total time spent with the patient was 25 minutes of which >50% was spent in counseling and coordination of care    The following high BMI interventions were performed this visit: encouragement to exercise    Merlin Higuera MD    Patient Active Problem List   Diagnosis     Acne Vulgaris     Chest Pain     Type 2 Diabetes Mellitus - Uncomplicated, Uncontrolled     Benign Prostatic Hypertrophy     Sciatica     Urine Tests Nonspecific Abnormal Findings     Hyperlipidemia     Hyperglycemia     General medical exam     Routine general medical examination at a health care facility     Knee pain

## 2021-06-11 NOTE — PROGRESS NOTES
Office Visit - Follow up    Anthony Evangelista   76 y.o. male    Date of Visit: 6/23/2017    Chief Complaint   Patient presents with     Diabetes     Hyperlipidemia     Sciatica     Numbness     left arm       Subjective: Diabetes mellitus type 2.  With hyperlipidemia.    Sciatica bothering him on the left side but better with physical therapy previously.  Left arm gets numb no associated weakness or atrophy or rash.    Discussed radiculopathy versus peripheral neuropathy although only left upper extremity affected he is right-hand dominant.  There is no associated chest pain or shortness of breath no blood in stool or urine medication list reviewed generally well-tolerated.    Diabetic foot examination all clear.  Diabetes ophthalmology allCLEAR for retinopathy on April 17, 2017.    ROS: A comprehensive review of systems was performed and was otherwise negative    Medications:  Prior to Admission medications    Medication Sig Start Date End Date Taking? Authorizing Provider   metFORMIN (GLUCOPHAGE) 500 MG tablet TAKE 1 TABLET BY MOUTH EVERY DAY AS DIRECTED 3/17/17  Yes Merlin Higuera MD   MV-MN/FA/VIT K/LYCOP/LUT/ZEAXA (OCUVITE EYE + MULTI ORAL) Take by mouth.   Yes PROVIDER, HISTORICAL   simvastatin (ZOCOR) 40 MG tablet TAKE 1 TABLET BY MOUTH EVERY DAY 3/17/17  Yes Merlin Higuera MD   ACCU-CHEK COMPACT PLUS TEST Strp Use 1 strip As Directed 2 (two) times a day. 2/17/17   PROVIDER, HISTORICAL   ONE TOUCH DELICA LANCETS MISC Use As Directed. Test twice daily. 2/18/11   Merlin Higuera MD       Allergies: No Known Allergies    Immunizations:   Immunization History   Administered Date(s) Administered     DT (pediatric) 03/04/2004     Influenza high dose, seasonal 10/21/2015, 10/17/2016     Influenza, inj, historic 11/06/2007, 10/07/2008, 09/22/2009, 10/08/2010, 10/28/2011, 10/22/2014     Influenza, seasonal,quad inj 6-35 mos 10/05/2012, 10/22/2013     Pneumo Conj 13-V (2010&after) 12/10/2015     Pneumo  Polysac 23-V 10/25/2006     Td, historic 03/04/2004     Tdap 07/15/2013       Exam Chest clear to auscultation and percussion.  Heart tones regular rhythm without murmur rub or gallop.  Abdomen soft nontender no organomegaly.  No peritoneal signs.  Extremities free of edema cyanosis or clubbing.  Neck veins nondistended no thyromegaly or scleral icterus noted, carotids full.  Skin warm and dry easily conversant good spirited.  Normal intelligence.  Neurologically intact no gross localizing findings.    Assessment and Plan  Diabetes mellitus type 2 check A1c plus lipid panel urine for microalbumin and blood sugar today.    Sciatica better with physical therapy previously.    Hyperlipidemia on statin therapy no history of MI or stroke.    Left arm sensory disturbance uncertain etiology.  Rule out carpal tunnel syndrome or ulnar neuropathy radial neuropathy or cervical radiculopathy.  Discussed.  No associated weakness or rash observe for now.  May require neurologic and or orthopedic upper extremity specialty consultation.    Time: total time spent with the patient was 25 minutes of which >50% was spent in counseling and coordination of care    The following high BMI interventions were performed this visit: encouragement to exercise    Merlin Higuera MD    Patient Active Problem List   Diagnosis     Acne Vulgaris     Chest Pain     Type 2 Diabetes Mellitus - Uncomplicated, Uncontrolled     Benign Prostatic Hypertrophy     Sciatica     Urine Tests Nonspecific Abnormal Findings     Hyperlipidemia     Hyperglycemia     General medical exam     Routine general medical examination at a health care facility     Knee pain

## 2021-06-13 NOTE — PROGRESS NOTES
Office Visit - Follow up    Anthony Evangelista   76 y.o. male    Date of Visit: 9/22/2017    Chief Complaint   Patient presents with     Diabetes     fasting     Hyperlipidemia     Fatigue     using Melatonin 3mg did not help last night       Subjective: Diabetes mellitus type 2 fasting.  Plus hyperlipidemia.  Bowel movements a mild seems less each day.  Last colonoscopy normal on January 26, 2011 colorectal surgery group presiding.  With modest weight loss.  Weight down now another 3 pounds and change in bowel movements.  I suggested the patient have a colonoscopy last done 6 years prior.    Melatonin at 3 mg at night not that helpful I did suggest higher dose of same mixed with Tylenol PM melatonin maximally okay to take 10 mg at bedtime.    Still some fatigue admits to being anxious but not sure why lost his daughter 1 year ago to a ruptured thoracic aortic aneurysm.    No blood in stool or urine medication list reviewed generally well-tolerated.    ROS: A comprehensive review of systems was performed and was otherwise negative    Medications:  Prior to Admission medications    Medication Sig Start Date End Date Taking? Authorizing Provider   metFORMIN (GLUCOPHAGE) 500 MG tablet TAKE 1 TABLET BY MOUTH EVERY DAY AS DIRECTED 6/23/17  Yes Merlin Higuera MD   MV-MN/FA/VIT K/LYCOP/LUT/ZEAXA (OCUVITE EYE + MULTI ORAL) Take by mouth.   Yes PROVIDER, HISTORICAL   ONE TOUCH DELICA LANCETS MISC Use As Directed. Test twice daily. 2/18/11  Yes Merlin Higuera MD   simvastatin (ZOCOR) 40 MG tablet TAKE 1 TABLET BY MOUTH EVERY DAY 6/23/17  Yes Merlin Higuera MD   ACCU-CHEK COMPACT PLUS TEST Strp Use 1 strip As Directed 2 (two) times a day. 2/17/17   PROVIDER, HISTORICAL   metFORMIN (GLUCOPHAGE) 500 MG tablet TAKE 1 TABLET BY MOUTH EVERY DAY AS DIRECTED 9/22/17 9/22/17  Merlin Higuera MD   simvastatin (ZOCOR) 40 MG tablet TAKE 1 TABLET BY MOUTH EVERY DAY 9/22/17 9/22/17  Merlin Higuera MD        Allergies: No Known Allergies    Immunizations:   Immunization History   Administered Date(s) Administered     DT (pediatric) 03/04/2004     Influenza high dose, seasonal 10/21/2015, 10/17/2016     Influenza, inj, historic 11/06/2007, 10/07/2008, 09/22/2009, 10/08/2010, 10/28/2011, 10/22/2014     Influenza, seasonal,quad inj 6-35 mos 10/05/2012, 10/22/2013     Pneumo Conj 13-V (2010&after) 12/10/2015     Pneumo Polysac 23-V 10/25/2006     Td, historic 03/04/2004     Tdap 07/15/2013       Exam Chest clear to auscultation and percussion.  Heart tones regular rhythm without murmur rub or gallop.  Abdomen soft nontender no organomegaly.  No peritoneal signs.  Extremities free of edema cyanosis or clubbing.  Neck veins nondistended no thyromegaly or scleral icterus noted, carotids full.  Skin warm and dry easily conversant good spirited.  Normal intelligence.  Neurologically intact no gross localizing findings.    Assessment and Plan  Diabetes mellitus type 2 check A1c blood sugar urine for microalbumin and lipid panel today.    Weight loss with change in bowel movements.  Suggest repeat colonoscopy as last done in normal January 26, 2011.    Hyperlipidemia on statin therapy no history of MI or stroke.    Return to clinic 1 month's time for reassessment.  Recommend more exercise as a way to alleviate stress tension.  Denies depression.  May be a candidate for citalopram or the like.    Time: total time spent with the patient was 25 minutes of which >50% was spent in counseling and coordination of care        Merlin Higuera MD    Patient Active Problem List   Diagnosis     Acne Vulgaris     Chest Pain     Type 2 Diabetes Mellitus - Uncomplicated, Uncontrolled     Benign Prostatic Hypertrophy     Sciatica     Urine Tests Nonspecific Abnormal Findings     Hyperlipidemia     Hyperglycemia     General medical exam     Routine general medical examination at a health care facility     Knee pain

## 2021-06-13 NOTE — PROGRESS NOTES
Office Visit - Follow up    Anthony Evangelista   76 y.o. male    Date of Visit: 9/15/2017    Chief Complaint   Patient presents with     Headache     slight     Fatigue     chills then hot feeling      Insomnia     tylenol pm not helping     Stress     Diabetes       Subjective: Diabetes mellitus type II.    Headaches slight with fatigue and insomnia.  Suggest melatonin 3 or 10 mg at bedtime.  Seen in emergency room Wednesday last September 13.  Blood sugar elevated initially at home 195.  Patient has not been exercising feeling hot and cold.  Later blood sugar checked at the emergency room 185 this morning at noon 155.  Extra stress bad habit of biting her lower lip the patient's blood sugar has been elevated and he does have type 2 diabetes.  This is related to insulin resistance.  He has never had ketoacidosis and is not prone to it.  Is not on insulin.    Normal colonoscopy dated January 26, 2011 colorectal surgery group presiding.    No blood in stool or urine no chest pain or shortness of breath.    Medication list reviewed generally well-tolerated.    ROS: A comprehensive review of systems was performed and was otherwise negative    Medications:  Prior to Admission medications    Medication Sig Start Date End Date Taking? Authorizing Provider   metFORMIN (GLUCOPHAGE) 500 MG tablet TAKE 1 TABLET BY MOUTH EVERY DAY AS DIRECTED 6/23/17  Yes Merlin Higuera MD   MV-MN/FA/VIT K/LYCOP/LUT/ZEAXA (OCUVITE EYE + MULTI ORAL) Take by mouth.   Yes PROVIDER, HISTORICAL   simvastatin (ZOCOR) 40 MG tablet TAKE 1 TABLET BY MOUTH EVERY DAY 6/23/17  Yes Merlin Higuera MD   ACCU-CHEK COMPACT PLUS TEST Strp Use 1 strip As Directed 2 (two) times a day. 2/17/17   PROVIDER, HISTORICAL   ONE TOUCH DELICA LANCETS MISC Use As Directed. Test twice daily. 2/18/11   Merlin Higuera MD       Allergies: No Known Allergies    Immunizations:   Immunization History   Administered Date(s) Administered     DT (pediatric) 03/04/2004      Influenza high dose, seasonal 10/21/2015, 10/17/2016     Influenza, inj, historic 11/06/2007, 10/07/2008, 09/22/2009, 10/08/2010, 10/28/2011, 10/22/2014     Influenza, seasonal,quad inj 6-35 mos 10/05/2012, 10/22/2013     Pneumo Conj 13-V (2010&after) 12/10/2015     Pneumo Polysac 23-V 10/25/2006     Td, historic 03/04/2004     Tdap 07/15/2013       Exam Chest clear to auscultation and percussion.  Heart tones regular rhythm without murmur rub or gallop.  Abdomen soft nontender no organomegaly.  No peritoneal signs.  Extremities free of edema cyanosis or clubbing.  Neck veins nondistended no thyromegaly or scleral icterus noted, carotids full.  Skin warm and dry easily conversant good spirited.  Normal intelligence.  Neurologically intact no gross localizing findings.    Assessment and Plan  Diabetes mellitus type 2 non-ketosis prone.  Will check blood sugar A1c lipid panel urine for microalbumin in 1 week fasting encourage more exercise and closer attention to a qualitative diabetic diet.    Added emotional stress uncertain etiology.  Discussed in detail and reassured.    Hyperlipidemia on statin therapy no history of MI or stroke.    Time: total time spent with the patient was 25 minutes of which >50% was spent in counseling and coordination of care    The following high BMI interventions were performed this visit: encouragement to exercise    Merlin Higuera MD    Patient Active Problem List   Diagnosis     Acne Vulgaris     Chest Pain     Type 2 Diabetes Mellitus - Uncomplicated, Uncontrolled     Benign Prostatic Hypertrophy     Sciatica     Urine Tests Nonspecific Abnormal Findings     Hyperlipidemia     Hyperglycemia     General medical exam     Routine general medical examination at a health care facility     Knee pain

## 2021-06-14 NOTE — TELEPHONE ENCOUNTER
Refill Approved    Rx renewed per Medication Renewal Policy. Medication was last renewed on 1/10/20, last OV 7/9/20.    Charline Leon, [facility] Triage/Med Refill 1/8/2021     Requested Prescriptions   Pending Prescriptions Disp Refills     simvastatin (ZOCOR) 40 MG tablet [Pharmacy Med Name: SIMVASTATIN 40MG TABLETS] 90 tablet 3     Sig: TAKE 1 TABLET BY MOUTH EVERY DAY       Statins Refill Protocol (Hmg CoA Reductase Inhibitors) Passed - 1/7/2021 10:29 AM        Passed - PCP or prescribing provider visit in past 12 months      Last office visit with prescriber/PCP: 1/10/2020 Merlin Higuera MD OR same dept: 1/10/2020 Merlin Higuera MD OR same specialty: 1/10/2020 eMrlin Higuera MD  Last physical: 6/19/2018 Last MTM visit: Visit date not found   Next visit within 3 mo: Visit date not found  Next physical within 3 mo: Visit date not found  Prescriber OR PCP: Merlin Higuera MD  Last diagnosis associated with med order: 1. Diabetes (H)  - simvastatin (ZOCOR) 40 MG tablet [Pharmacy Med Name: SIMVASTATIN 40MG TABLETS]; TAKE 1 TABLET BY MOUTH EVERY DAY  Dispense: 90 tablet; Refill: 3    If protocol passes may refill for 12 months if within 3 months of last provider visit (or a total of 15 months).

## 2021-06-14 NOTE — PROGRESS NOTES
Office Visit - Follow up    Anthony Evangelista   77 y.o. 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  Saw dermatologist on November 21, 2017.  Last laboratory test done on September 22, 2017.  No blood in stool or urine medication list reviewed.  Most recent colonoscopy done November 17, 2017 with colorectal surgery group showed no sign of cancer but tubular adenomatous colon polyps 2 in number.  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    No blood in stool or urine medication list reviewed denies chest pain or shortness of breath.  Patient is fasting today for today's follow-up examination in terms of diabetes mellitus type 2 with hyperlipidemia.    ROS: A comprehensive review of systems was performed and was otherwise negative    Medications:  Prior to Admission medications    Medication Sig Start Date End Date Taking? Authorizing Provider   melatonin 3 mg Tab tablet Take 3 mg by mouth at bedtime as needed (two tablets at bedtime).   Yes PROVIDER, HISTORICAL   metFORMIN (GLUCOPHAGE) 500 MG tablet TAKE 1 TABLET BY MOUTH EVERY DAY AS DIRECTED 6/23/17  Yes eMrlin Higuera MD   MV-MN/FA/VIT K/LYCOP/LUT/ZEAXA (OCUVITE EYE + MULTI ORAL) Take by mouth.   Yes PROVIDER, HISTORICAL   ACCU-CHEK COMPACT PLUS TEST Strp Use 1 strip As Directed 2 (two) times a day. 2/17/17   PROVIDER, HISTORICAL   ONE TOUCH DELICA LANCETS MISC Use As Directed. Test twice daily. 2/18/11   Merlin Higuera MD   simvastatin (ZOCOR) 40 MG tablet TAKE 1 TABLET BY MOUTH EVERY DAY 6/23/17   Merlin Higuera MD       Allergies: No Known Allergies    Immunizations:   Immunization History   Administered Date(s)  Administered     DT (pediatric) 03/04/2004     Influenza high dose, seasonal 10/21/2015, 10/17/2016, 10/24/2017     Influenza, inj, historic,unspecified 11/06/2007, 10/07/2008, 09/22/2009, 10/08/2010, 10/28/2011, 10/22/2014     Influenza, seasonal,quad inj 6-35 mos 10/05/2012, 10/22/2013     Pneumo Conj 13-V (2010&after) 12/10/2015     Pneumo Polysac 23-V 10/25/2006     Td,adult,historic,unspecified 03/04/2004     Tdap 07/15/2013       Exam Chest clear to auscultation and percussion.  Heart tones regular rhythm without murmur rub or gallop.  Abdomen soft nontender no organomegaly.  No peritoneal signs.  Extremities free of edema cyanosis or clubbing.  Neck veins nondistended no thyromegaly or scleral icterus noted, carotids full.  Skin warm and dry easily conversant good spirited.  Normal intelligence.  Neurologically intact no gross localizing findings.    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    Diabetes mellitus type 2 check A1c plus blood sugar urine for microalbumin and lipid panel today.    Hyperlipidemia on statin therapy no history of MI or stroke.  Return to clinic in 2 months time.    Time: total time spent with the patient was 25 minutes of which >50% was spent in counseling and coordination of care    The following high BMI interventions were performed this visit: encouragement to exercise    Merlin Higuera MD    Patient Active Problem List   Diagnosis     Acne Vulgaris     Chest Pain     Type 2 Diabetes Mellitus - Uncomplicated, Uncontrolled     Benign Prostatic Hypertrophy     Sciatica     Urine Tests Nonspecific Abnormal Findings     Hyperlipidemia     Hyperglycemia     General medical exam     Routine general medical examination at a health care facility     Knee pain

## 2021-06-16 PROBLEM — E11.9 DIABETES MELLITUS, TYPE 2 (H): Status: ACTIVE | Noted: 2021-03-26

## 2021-06-16 NOTE — PATIENT INSTRUCTIONS - HE
An MRI of your low back is ordered today for further work-up regarding your back pain, leg pain and leg weakness.  Someone will call you to schedule the MRI.  Please let them know that you would prefer to have the MRI done at [facility].  Please follow-up with Dr. Kathleen after he had the MRI to review the results.    An EMG of your legs is ordered today to look for a peripheral neuropathy related to your diabetes.  You can schedule the EMG at the  before you leave.  This will be performed by Dr. Lozano here at the spine center.    After you have scheduled the MRI and the EMG, please call Dr. Kathleen scheduling line at [phone] to schedule an appointment with her to review the results of both the MRI and the EMGPlease do not hesitate to contact the clinic at [phone] if you have any questions/concerns or any worsening of your pain prior to that time. You are also welcome to contact Dr. Kathleen via [employer].      Gabapentin (nerve pain medication) was prescribed today.  Please use the chart to increase the dose to an amount that controls your pain (do not exceed 3 tablets three times a day).  If you have any questions on how to increase the dose or any side effects to this medication, please call the clinic.    Gabapentin 300mg Dosing Chart    DATE  MORNING AFTERNOON BEDTIME    Day 1 0 0 1    Day 2 0 0 1    Day 3 0 0 1    Day 4 1 0 1    Day 5 1 0 1    Day 6 1 0 1    Day 7 1 1 1    Day 8 1 1 1    Day 9 1 1 1    Day 10 1 1 2    Day 11 1 1 2    Day 12 1 1 2    Day 13 2 1 2    Day 14 2 1 2    Day 15 2 1 2    Day 16 2 2 2    Day 17 2 2 2    Day 18 2 2 2    Day 19 2 2 3    Day 20 2 2 3    Day 21 2 2 3    Day 22 3 2 3    Day 23 3 2 3    Day 24 3 2 3    Day 25 3 3 3    Day 26 3 3 3    Day 27 3 3 3     Continue medication, taking 3 capsules three times daily    Please call if you have any questions regarding how to take your medication  Clinic Phone # [phone]

## 2021-06-16 NOTE — PROGRESS NOTES
Assessment and Plan:   Annual wellness visit  Patient has been advised of split billing requirements and indicates understanding: Yes  1. Screening for prostate cancer  Wellness visit and screen for prostate cancer.  - HM2(CBC w/o Differential)  - Comprehensive Metabolic Panel  - Lipid Cascade  - Glycosylated Hemoglobin A1c  - Thyroid Stimulating Hormone (TSH)  - Urinalysis-UC if Indicated  - PSA (Prostatic-Specific Antigen), Annual Screen    2. Routine general medical examination at a health care facility  Annual wellness visit  - HM2(CBC w/o Differential)  - Comprehensive Metabolic Panel  - Lipid Cascade  - Glycosylated Hemoglobin A1c  - Thyroid Stimulating Hormone (TSH)  - Urinalysis-UC if Indicated  - PSA (Prostatic-Specific Antigen), Annual Screen    3. Type 2 diabetes mellitus with complication, without long-term current use of insulin (H)  Annual wellness visit and history of type 2 diabetes mellitus related to insulin resistance.  BMI only 24.  - HM2(CBC w/o Differential)  - Comprehensive Metabolic Panel  - Lipid Cascade  - Glycosylated Hemoglobin A1c  - Thyroid Stimulating Hormone (TSH)  - Urinalysis-UC if Indicated  - PSA (Prostatic-Specific Antigen), Annual Screen     The patient's current medical problems were reviewed.    I have had an Advance Directives discussion with the patient.  The following health maintenance schedule was reviewed with the patient and provided in printed form in the after visit summary:   Health Maintenance Due   Topic Date Due     HEPATITIS C SCREENING  Never done     ZOSTER VACCINES (1 of 2) Never done     BMP  08/02/2020     DIABETIC EYE EXAM  09/17/2020     A1C  02/25/2021        Subjective:   Chief Complaint: Anthony Evangelista is an 80 y.o. male here for an Annual Wellness visit.   HPI: Annual wellness visit screen for prostate cancer and past health history of type 2 diabetes for this 80-year-old male.    Sciatica bothers left side.    Diabetic foot examination done  today allCLEAR.    Muscle spasms nightly in the down to the toes.    Memory concerns with insomnia.  Occasional depression related to COVID-19 and being cooped up nonsuicidal.    Colonoscopy dated 2017 showed 2 tubular adenomatous polyps with colorectal surgery group Dr. FUNEZ presiding.    Light social alcohol non-smoker.  History of type 2 diabetes BMI only 24.5.  Bilateral hernia repair.    Appendectomy and tonsillectomy no issues with anesthesia previously.  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    Mother  cancer liver 78.    Father  56 MI.  One older brother age 82 recently passed 1 daughter  at age 51 of a thoracic aortic aneurysm and its complications 1 son well with history of diverticulosis wife well with history of recurrent pneumonia.  Also patient was generally enjoys good health    Review of Systems:    Please see above.  The rest of the review of systems are negative for all systems.    Patient Care Team:  Merlin Higuera MD as PCP - General  Merlin Higuera MD as Assigned PCP     Patient Active Problem List   Diagnosis     Acne Vulgaris     Chest Pain     Type 2 Diabetes Mellitus - Uncomplicated, Uncontrolled     Benign Prostatic Hypertrophy     Sciatica     Urine Tests Nonspecific Abnormal Findings     Hyperlipidemia     Hyperglycemia     General medical exam     Routine general medical examination at a health care facility     Knee pain     Diabetes mellitus, type 2 (H)     No past medical history on file.   Past Surgical History:   Procedure Laterality Date     TX APPENDECTOMY      Description: Appendectomy;  Recorded: 2008;      No family history on file.   Social History     Socioeconomic History     Marital status:      Spouse name: Not on file     Number of children: Not on file     Years of education: Not on file     Highest education level: Not on file   Occupational History     Not on file   Social Needs     Financial  "resource strain: Not on file     Food insecurity     Worry: Not on file     Inability: Not on file     Transportation needs     Medical: Not on file     Non-medical: Not on file   Tobacco Use     Smoking status: Never Smoker     Smokeless tobacco: Never Used   Substance and Sexual Activity     Alcohol use: No     Comment: rare     Drug use: No     Sexual activity: Not on file   Lifestyle     Physical activity     Days per week: Not on file     Minutes per session: Not on file     Stress: Not on file   Relationships     Social connections     Talks on phone: Not on file     Gets together: Not on file     Attends [employer] service: Not on file     Active member of club or organization: Not on file     Attends meetings of clubs or organizations: Not on file     Relationship status: Not on file     Intimate partner violence     Fear of current or ex partner: Not on file     Emotionally abused: Not on file     Physically abused: Not on file     Forced sexual activity: Not on file   Other Topics Concern     Not on file   Social History Narrative     Not on file      Current Outpatient Medications   Medication Sig Dispense Refill     ACCU-CHEK COMPACT PLUS TEST Strp Use 1 strip As Directed 2 (two) times a day. 100 strip 11     metFORMIN (GLUCOPHAGE) 500 MG tablet TAKE 1 TABLET(500 MG) BY MOUTH TWICE DAILY WITH MEALS 180 tablet 3     im-zn-KP-vit J-lsxke-yrdn-zeax (OCUVITE EYE PLUS MULTI) 200- mcg Tab Take 1 tablet by mouth daily. 180 tablet 0     ONE TOUCH DELICA LANCETS MISC Use As Directed. Test twice daily.       simvastatin (ZOCOR) 40 MG tablet Take 1 tablet (40 mg total) by mouth daily. 90 tablet 1     No current facility-administered medications for this visit.       Objective:   Vital Signs:   Visit Vitals  /68 (Patient Site: Right Arm, Patient Position: Sitting)   Pulse 74   Temp 97  F (36.1  C)   Ht 5' 6.5\" (1.689 m)   Wt 154 lb (69.9 kg)   SpO2 95%   BMI 24.48 kg/m           VisionScreening:  No exam " data present     PHYSICAL EXAM  Chest clear to auscultation and percussion.  Heart tones regular rhythm without murmur rub or gallop.  Abdomen soft nontender no organomegaly.  No peritoneal signs.  Extremities free of edema cyanosis or clubbing.  Neck veins nondistended no thyromegaly or scleral icterus noted, carotids full.  Skin warm and dry easily conversant good spirited.  Normal intelligence.  Neurologically intact no gross localizing findings.  Skin negative lymph negative neuro negative psych normal HEENT negative genital rectal exam negative slight increase in prostate size smooth without nodularity testicular scrotal exam normal no inguinal hernia good pulse noted all 4 extremities no carotid bruits eyes ears nose throat exam negative.  No thyromegaly.    66-1/2 inches tall weight down 3 pounds from previous at 154 BMI 24.5    134/68 pulse 74 respirations 18 O2 sats 95% on room air temperature this morning 97.0 F.  Not in acute distress he is intelligent easily conversant articulate and good spirited he does not appear depressed to me.  No suicidal ideations.    Assessment Results 3/26/2021   Activities of Daily Living No help needed   Instrumental Activities of Daily Living No help needed   Get Up and Go Score Less than 12 seconds   Mini Cog Total Score 2   Some recent data might be hidden     A Mini-Cog score of 0-2 suggests the possibility of dementia, score of 3-5 suggests no dementia    Identified Health Risks:     Patient's advanced directive was discussed and I am comfortable with the patient's wishes.        The patient was provided with appropriate referrals to address his memory problem.
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
## 2021-06-16 NOTE — PATIENT INSTRUCTIONS - HE
Advance Directive  Patient s advance directive was discussed and I am comfortable with the patient s wishes.  Patient Education   Personalized Prevention Plan  You are due for the preventive services outlined below.  Your care team is available to assist you in scheduling these services.  If you have already completed any of these items, please share that information with your care team to update in your medical record.  Health Maintenance Due   Topic Date Due     HEPATITIS C SCREENING  Never done     ZOSTER VACCINES (1 of 2) Never done     BMP  08/02/2020     DIABETIC EYE EXAM  09/17/2020     A1C  02/25/2021

## 2021-06-17 NOTE — PATIENT INSTRUCTIONS - HE
Anthony,      Your EMG was inconclusive.  It showed that maybe there was an old damaged L5 nerve, and potentially there is an old damage peroneal nerve on the right side.  However it did not show anything definitive, and the good news is that it did not show any active or current nerve damage.  There is no evidence for a large fiber peripheral neuropathy.  There may be a small fiber peripheral neuropathy, but this test is not sensitive enough to  a small fiber peripheral neuropathy.  A small fiber peripheral neuropathy is in the early stages though.    Please continue to do your physical therapy exercises.  You may want to do a few more repetitions on the left side of the side bothers you more.    You can increase your gabapentin to 900 mg 3 times a day.  You can stay on this dose for about a week to see how you feel.  After that you should decrease to 600 mg 3 times a day and see if you notice a difference in your symptoms or how you are feeling from the mental clarity standpoint.  You can then decide if you want to stay on the 600 mg 3 times a day or the 900 mg 3 times a day.    A nurse will call you in 1 week to see how you are doing with the leg cramping.  You are also welcome to contact Dr. Kathleen via [email or web address].      Please do not hesitate to contact the clinic via [email or web address] or via the nurse line at [phone] if you have any questions, concerns, or any significant worsening of your pain.  You are a candidate for lumbar epidural steroid injections if your pain significantly worsens.  Again please do not hesitate to reach out anytime with questions or concerns.    Thank you, Anthony!  Dr. Kathleen

## 2021-06-17 NOTE — PROGRESS NOTES
Assessment:   Anthony Evangelista is a 80 y.o. y.o. male with past medical history significant for type 2 diabetes, benign prostatic hypertrophy, hyperlipidemia who presents today for follow-up regarding chronic bilateral low back pain with left greater than right radicular/sciatica type leg symptoms.  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  His symptoms are stable at this time.  He reports that he is having a difficult time managing his stress, which could be the result of his increased hemoglobin A1c.   There is some concern for peripheral neuropathy as well, but the patient declined to proceed with the EMG until he can have this appointment.  He is without any red flag symptoms, specifically no bowel or bladder dysfunction.    PSP:  Dr. Kathleen       Plan:     A shared decision making plan was used.  The patient's values and choices were respected.  The following represents what was discussed and decided upon by the physician and the patient.      1.  DIAGNOSTIC TESTS: The patient's MRI of the lumbar spine from April 23 of 2021 was personally reviewed today by the physician with the physician performing her own interpretation.  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  There is severe central canal stenosis at the L3-4 and L4-5 level which may impinge upon the cauda equina.  There is moderate central canal stenosis and lateral recess stenosis at the L2-3 level which may impinge on the L3 nerve roots.  There is significant right L5-S1 lateral recess stenosis which may impinge on the right S1 nerve.  There is significant bilateral L5-S1 foraminal stenosis with more moderate findings at the L2-3, L3-4, and L4-5 levels.  The images were shown to the patient and  the findings were explained using a spine model.  -Recommended that the patient move forward with the EMG if this will help to determine if there is any nerve damage and to what extent there is nerve damage.  He states that he was afraid that this could go into the spinal cord and cause paralysis.  I explained that the Antabuse is a very tiny needle and will not be anywhere near his spinal cord.  This is a very benign exam, and I have never heard of any serious complication from an EMG.  Patient states that he will schedule this.  I would like to follow-up with him afterwards to review the results.  -His hemoglobin A1c from March 26 of 2021 was reviewed today by the physician and was 7.7.  -His creatinine from March 26 of 2021 was reviewed by the physician and was 1.3 (which is the very high end of normal).  His GFR was 56 (slightly decreased from normal of greater than 60).  2.  PHYSICAL THERAPY: The patient last did physical therapy through [facility] in 2019.  He has been very diligent with doing his home exercise program on a regular basis and he is encouraged to continue doing these exercises long-term.  3.  MEDICATIONS: At the last visit, he was given gabapentin 300 mg and asked to titrate up.  At this time, he states that he did not want to try it because he has tried in the past and it was not helpful.  I explained that he has tried in the past at 100 mg, and this is 300 mg.  Explained that he may have significantly more relief with the actual adult dose of gabapentin.  If he fails to have relief with the adult dose, then we could try Lyrica, but his insurance will likely not cover Lyrica unless he has tried the max dose of gabapentin first, especially given that he did not have any side effects with the lower dose of gabapentin.  4.  INTERVENTIONS: We will hold off on any injections at this time to see what his MRI shows.  His pain is largely manageable, so he can manage his pain without injections, we can  postpone this as long as possible.  -He reports that his second dose of the Covid vaccine was over a month ago.  5.  PATIENT EDUCATION:    -The MRI findings were discussed with the patient.  The diagnosis of central canal stenosis and foraminal stenosis were discussed with the patient.  Discussed that the cauda equina does not have much room in the central canal, but he does not have any symptoms of significant stenosis and compression of the cauda equina such as significant weakness in the legs, numbness and tingling in the legs, or any bowel or bladder dysfunction.  He was told that if he develops any of the symptoms, he should call the clinic immediately.  -Patient states that he has been having quite a bit of stress in his life.  He wonders if this could be contributing to his high hemoglobin A1c.  I did confirm that stress can increase cortisol levels, which can increase blood sugar levels.  He asked how he could decrease the amount of stress in his life.  I explained that it is likely impossible to completely eliminate stress from his life.  A better way to look at it would be to see how he can better manage the stress in his life.  Discussed strategies such as treatment consciousness journaling.  Discussed more traditional forms of meditation.  Discussed gratitude list and writing out lists of things that worry him or that give him stress and then if the worry is actually warranted or not.  Explained that there are lots of resources on the Internet to that he can look for.  He was offered a referral for formal counseling as well but he declines at this time stating that he would rather try some things on his own first.  -Discussed that the peripheral neuropathy is likely from the diabetes.  Discussed that peripheral neuropathy cannot be cured, only the progression can be slowed.  This is best done by managing his blood sugars/diabetes and decreasing his hemoglobin A1c.  -He had questions about how diabetes  causes peripheral neuropathy in addition to diabetic neuropathy.  Discussed that this affects a small blood vessels, which can no longer supply oxygen and nutrients to the tissues that they supply, causing them to die.  These are typically first the small nerves and in the eye, it affects the retina, which is how diabetic retinopathy can occur which can affect vision.  -All of his questions were answered to his satisfaction today.  He was in agreement with the treatment plan.  6.  FOLLOW-UP: Patient was asked to follow-up with Dr. Lozano for the EMG.  He can then follow-up with Dr. Kathleen after the EMG to review the results and discuss other treatment options.  The follow-up with Dr. Kathleen can be done by video.  If there are any questions/concerns or any significant worsening of pain prior to that time, the patient is asked to call the clinic via the nurse navigation line or via [employer].     A total of 41 minutes was spent in the care of this patient.    Subjective:     Anthony Evangelista is a 80 y.o. male who presents today for follow-up regarding chronic left-sided low back pain with radiation into the left buttock and left posterior lateral hamstring stopping at the knee.  He states that he really does not have much back pain, is mainly in the buttock and left thigh.  A little bit of pain in the right side, but mainly in the left.  He does have some numbness and tingling but specifically denies any weakness or other concerning symptoms.  He rates his pain today at a 3 or 4 out of 10.  His pain is worse first thing in the morning when he gets up.  Reports that after moving around for about 15 minutes it seems to get better.  He reports that with walking, the first 5 minutes 10 to her but then it seems to improve.  He reports that laying on his stomach will also aggravate his pain.  Reports that he does not notice any pain when he coughs.  He does feel better when he sits down or lays on his back.  He denies  any new symptoms at this time.  He continues to do his home exercise program from physical therapy.  He did not start the gabapentin because he states that he has tried it in the past and did not think that it would be helpful to try it again.  He did not remember that we had increase the dose.  He has been taking Advil at bedtime, but states that he was told by multiple people that he should not take this on a regular basis he has not been taking this anymore.  Reports that he does not sleep very well, only 3 to 4 hours per night.  He did not schedule the EMG, because he read that the needle could go into the spinal cord and make him paralyzed.    Past medical history is reviewed and is unchanged for any new medical diagnoses in the interim.      Family history is reviewed and is unchanged in the interim.      Review of Systems:  Positive for numbness and tingling in the left leg..  Pertinent negatives include no weakness in the legs, headaches, fevers, chills, unexplained weight loss, bowel incontinence, bladder incontinence, trips, stumbles, falls.  All others reviewed and are negative.     Objective:   CONSTITUTIONAL:  Vital signs as above.  No acute distress.  The patient is well nourished and well groomed.    PSYCHIATRIC:  The patient is awake, alert, oriented to person, place and time.  The patient is answering questions appropriately with clear speech.  Normal affect.  SKIN:  Skin over the face, posterior torso, bilateral upper and lower extremities is clean, dry, intact without rashes.  MUSCULOSKELETAL:  Gait is non-antalgic.  The patient is able to heel and toe walk without any difficulty.  No significant tenderness over the bilateral lumbar paraspinal muscles.      He has 4/5 strength of the left hip flexor and 5/5 strength of the right hip flexor.  The patient has 5/5 strength for the bilateral  knee flexors/extensors, ankle dorsiflexors/plantar flexors, ankle evertors/invertors.    NEUROLOGICAL: 2/4  patellar, medial hamstring, achilles reflexes which are symmetric bilaterally.  No ankle clonus bilaterally.  Sensation to light touch is intact in the bilateral L4, L5, and S1 dermatomes.

## 2021-06-17 NOTE — PROGRESS NOTES
Assessment:   Anthony Evangelista is a 80 y.o. y.o. male with past medical history significant for type 2 diabetes, benign prostatic hypertrophy, hyperlipidemia who presents today for follow-up regarding chronic bilateral low back pain with left greater than right radicular/sciatica type leg symptoms.  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  His EMG showed a possible chronic, inactive left S1 radiculopathy and a possible chronic inactive right L5 radiculopathy.  There is no electrodiagnostic evidence of large fiber peripheral neuropathy seen on his EMG of May 12 of 2021.  He remains without any red flag symptoms.    PSP:  Dr. Kathleen       Plan:     A shared decision making plan was used.  The patient's values and choices were respected.  The following represents what was discussed and decided upon by the physician and the patient.      1.  DIAGNOSTIC TESTS: His EMG report from May 12 of 2021 was reviewed by the physician and is summarized as follows: There are prolonged motor unit potentials in the left gastrocnemius muscle in combination with an abnormal left tibial H reflex.  All these findings are nonspecific and nondiagnostic, under the correct clinical condition, they can be observed in a chronic and inactive left S1 radiculopathy.  There is mild, low amplitude of the right peroneal motor study to the EDB, which is nonspecific.  This could represent a chronic and inactive right L5 radiculopathy versus a nonlocalizable peroneal neuropathy.  There is no active denervation along the peroneal innervated muscles in the right lower extremity.  No evidence for peripheral neuropathy in the bilateral lower extremities.  -His MRI of the lumbar spine from April 23 of 2021 was personally reviewed today by the physician with physician performing her own interpretation.  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  There is  significant central canal stenosis at the L3-4 and L4-5 levels.  There is moderate central canal stenosis and lateral recess stenosis at the L2-3 level which may impinge on the L3 nerves.  There are significant right lateral recess stenosis which may impinge on the right S1 nerve.  There is significant bilateral L5-S1 foraminal stenosis, with more moderate bilateral foraminal stenosis at the L2-3, L3-4, L4-5 levels.  The images were shown to the patient.  2.  PHYSICAL THERAPY: He last did physical therapy in 2019.  He has been diligent in doing his home exercise program on a regular basis and is encouraged to continue doing these long-term.  3.  MEDICATIONS: At the last visit, he was encouraged to use the gabapentin 300 mg capsules to titrate up to a maximum of 900 mg 3 times a day.  He had previously failed the 100 mg capsules to be ineffective.  At this time, he will be titrating up to 900 mg 3 times a day.  I explained that this is safe to take long-term.  However he can use a lower dose if he would like.  He should stay at the 900 mg 3 times a day for a week and see how he is feeling both with regards to his back and leg symptoms in addition to his mental clarity.  He can then decrease to 600 mg 3 times a day to compare to how he felt on the 900 mg 3 times a day.  He can then choose which dose he would prefer to stay on.  4.  INTERVENTIONS: There is no need for injections at this time given that his pain is quite manageable.  -If pain returns, I would recommend starting with bilateral L5-S1 transforaminal epidural steroid injections.  -If he fails to have relief with the bilateral L5-S1 TFESI's, then he could be offered bilateral S1-2 TFESI's.  5.  PATIENT EDUCATION:    -I did reassure the patient that the body is very good at adapting.  Even though he has lumbar stenosis, it appears that his body has adapted well.  He continues to do his home exercise program on a regular basis and as long as the gabapentin is  helping, there may not be any need to do anything further such as injections.  It is unclear if he would potentially need surgery, though I think that it is unlikely that he would necessarily need surgery given his age and the fact that his symptoms are very manageable at this time.  -All of his questions were answered to his satisfaction today.  6.  FOLLOW-UP: Nurse lottie is asked to call him in 1 week to check the status of his leg cramping after he has been at the 900 mg 3 times a day for a week.  If he is still having leg cramping, I would recommend he stay on this dose and do a virtual visit with me to further discuss the leg cramp pain, as he did not remember to discuss this during the time of his visit and mentioned it as the [facility] staff was discharging him.  I did encourage him to call with any questions or concerns.  I did encourage him to call if his pain significantly worsens.  He can also send a nVoq message if he prefers.      Subjective:     Anthony Evangelista is a 80 y.o. male who presents today for follow-up regarding chronic bilateral low back pain with left greater than right radicular/sciatic type leg symptoms.  He is here today to follow-up from his EMG.  He continues to note that his symptoms are under good control.  He has some back pain and then pain in the posterior thighs that pretty much stops at the knees.  He does have some numbness and tingling and he feels like his left leg is little bit weaker compared to the right.  But overall he is largely functional.  Today he rates his pain at a 0 out of 10.  At worst it is a 5 out of 10.  At best is a 0 out of 10.  His pain is worse with walking.  Certain exercises and lifting can aggravate the pain.  Reports that he has learned how to avoid most of the activities that aggravate his pain.  He does feel like the gabapentin is helping and he will be titrating up to 900 mg 3 times a day.  He has not noticed any side effects.  He is wondering  what his EMG shows and what the long-term treatment plan is.    Past medical history is reviewed and is unchanged for any new medical diagnoses in the interim.      Family history is reviewed and is unchanged in the interim.      Review of Systems:  Positive for numbness and tingling as well as weakness in the bilateral lower extremities..  Pertinent negatives include no fevers, chills, unexplained weight loss, bowel incontinence, bladder incontinence, trips, stumbles, falls.  All others reviewed and are negative.     Objective:   CONSTITUTIONAL:  Vital signs as above.  No acute distress.  The patient is well nourished and well groomed.    PSYCHIATRIC:  The patient is awake, alert, oriented to person, place and time.  The patient is answering questions appropriately with clear speech.  Normal affect.  SKIN:  Skin over the face, posterior torso, bilateral upper and lower extremities is clean, dry, intact without rashes.  MUSCULOSKELETAL:  Gait is non-antalgic.  The patient is able to heel and toe walk without any difficulty.  No significant tenderness over the bilateral lumbar paraspinal muscles.      The patient has 5/5 strength for the bilateral hip flexors, knee flexors/extensors, ankle dorsiflexors/plantar flexors, ankle evertors/invertors.    NEUROLOGICAL: Trace patellar, absent medial hamstring, trace achilles reflexes which are symmetric bilaterally.  No ankle clonus bilaterally.  Sensation to light touch is intact in the bilateral L4, L5, and S1 dermatomes.

## 2021-06-17 NOTE — PATIENT INSTRUCTIONS - HE
Anthony,     Please reschedule your EMG at the .  The EMG uses very tiny needles and it will not go anywhere near your spinal cord or any of the major nerves.  This is very safe test and will give us some very important information.       Please also make a follow-up appointment with me after your EMG so that we can review the results.  This can be done by video visit if you prefer.  Please do not hesitate to contact the clinic at [phone] if you have any questions/concerns or any worsening of your pain prior to that time. You are also welcome to contact me via [employer].      It will be important for you to try to manage your stress, which I think will help decrease your cortisol levels which will help to decrease your hemoglobin A1c.  You can look up stress management ideas on your own on the Internet.  We discussed trying what is called stream of consciousness journaling.  This is where you right nonstop for 10 minutes, letting what ever is in your brain, out onto the paper.  Do not  what you are writing, do not worry about spelling or grammar, just continue writing for the total 10 minutes.  Afterwards do not read what you wrote, throw the paper away or burn it.  Try doing this every day as a form of meditation.  If you would like to see a counselor for other ideas to help manage stress, please let me know and I would be happy to provide a referral.        Gabapentin (nerve pain medication) was prescribed today.  This is the adult dose of gabapentin (300 mg).  You were previously on a pediatric dose (100 mg).  Please try this adult dose to see if it will be more effective for you.  It may significantly help with your sleep, so that you do not have to take the ibuprofen.  Please use the chart to increase the dose to an amount that controls your pain (do not exceed 3 tablets three times a day).  If you have any questions on how to increase the dose or any side effects to this medication, please  call the clinic.    Gabapentin 300mg Dosing Chart    DATE  MORNING AFTERNOON BEDTIME    Day 1 0 0 1    Day 2 0 0 1    Day 3 0 0 1    Day 4 1 0 1    Day 5 1 0 1    Day 6 1 0 1    Day 7 1 1 1    Day 8 1 1 1    Day 9 1 1 1    Day 10 1 1 2    Day 11 1 1 2    Day 12 1 1 2    Day 13 2 1 2    Day 14 2 1 2    Day 15 2 1 2    Day 16 2 2 2    Day 17 2 2 2    Day 18 2 2 2    Day 19 2 2 3    Day 20 2 2 3    Day 21 2 2 3    Day 22 3 2 3    Day 23 3 2 3    Day 24 3 2 3    Day 25 3 3 3    Day 26 3 3 3    Day 27 3 3 3     Continue medication, taking 3 capsules three times daily    Please call if you have any questions regarding how to take your medication  Clinic Phone # [phone]

## 2021-06-17 NOTE — PROGRESS NOTES
Patient presents at the request of Dr. Beatriz Kathleen for bilateral lower extremity EMG.  He has bilateral foot paresthesias/abnormal sensation across the metatarsal arch with some leg cramping.  He has bilateral gluteal pain posterior lateral leg pain to the calf.  On exam he has some mild right EDB atrophy/small EDB on the right versus left.  Normal sensation to light touch in the lower extremities.  Normal patellar reflexes and right Achilles with absent left Achilles.  Normal strength throughout the major muscle groups of the bilateral lower extremities    EMG/NCS  results: Please see scanned full report.    Comment NCS: Abnormal study  1.  Normal bilateral sural SNAPs.  2.  Diffuse low amplitude right peroneal CMAP to the EDB without amplitude drop or slowing across the fibular neck.  3.  Normal left peroneal CMAP to the EDB.  4.  Normal bilateral tibial CMAP's to the AH.  5.  Low amplitude and prolonged latency left tibial H reflex versus right.    Comment EMG: Abnormal study  1.  Prolonged motor unit potential duration of the left medial gastrocnemius muscle.  Remainder left lower extremity needle EMG normal.  2.  Normal right lower extremity needle EMG.    Interpretation: Abnormal study: There is electrodiagnostic evidence of:    1.  Prolonged motor unit potential duration of the left medial gastrocnemius muscle in combination with abnormal left tibial H reflex.  These findings are nonspecific and nondiagnostic.  Under the correct clinical condition, they can be observed in a chronic and inactive left S1 radiculopathy.      2.  Mild low amplitude of the right peroneal motor study to the EDB.  No amplitude drop or slowing across the fibular neck.  These findings are nonspecific.  They can be normal for the patient if he has a congenitally small extensor digitorum brevis or may represent prior axon loss to the extensor digitorum brevis such as a chronic and inactive right L5 radiculopathy versus  nonlocalizable peroneal neuropathy.  In either event, there is no active denervation along the peroneal innervated muscles in the right lower extremity.    3.  There is no electrodiagnostic evidence of peripheral polyneuropathy in the bilateral lower extremities.    Note: Electrodiagnostic testing is not diagnostic for small fiber polyneuropathy.  Common causes a small fiber peripheral neuropathy include diabetes, toxins (especially alcohol), drug/medications, hypertriglyceridemia, hereditary, and idiopathic.  If clinically indicated or there are progression of symptoms, further workup can be considered including evaluation of common causes of axonal neuropathy such as B12 deficiency, thyroid disease, and diabetes.    Repeat EMG can be considered if symptoms persist or worsen over the next 6-12 months.    Patient will follow up with Dr. Kathleen for result review.    The testing was completed in its entirety by the physician.      It was our pleasure caring for your patient today, if there any questions or concerns please do not hesitate to contact us.

## 2021-06-17 NOTE — PATIENT INSTRUCTIONS - HE
Thank you for choosing the [facility] for your EMG testing.    The ordering provider will receive your final EMG results within the next few days.  Please follow up with your provider for the results and further treatment recommendations.

## 2021-06-18 NOTE — PROGRESS NOTES
Assessment and Plan:   Annual wellness visit    1. Routine general medical examination at a health care facility  Annual wellness visit  - HM2(CBC w/o Differential)  - Comprehensive Metabolic Panel  - Lipid Cascade  - Glycosylated Hemoglobin A1c  - Thyroid Stimulating Hormone (TSH)  - Urinalysis-UC if Indicated  - PSA (Prostatic-Specific Antigen), Annual Screen     The patient's current medical problems were reviewed.    I have had an Advance Directives discussion with the patient.  The following health maintenance schedule was reviewed with the patient and provided in printed form in the after visit summary:   Health Maintenance   Topic Date Due     ZOSTER VACCINE  2001     DIABETES OPHTHALMOLOGY EXAM  2018     DIABETES HEMOGLOBIN A1C  2018     DIABETES URINE MICROALBUMIN  2018     DIABETES FOLLOW-UP  2018     DIABETES FOOT EXAM  2019     FALL RISK ASSESSMENT  2019     ADVANCE DIRECTIVES DISCUSSED WITH PATIENT  09/15/2022     TD 18+ HE  07/15/2023     PNEUMOCOCCAL POLYSACCHARIDE VACCINE AGE 65 AND OVER  Completed     INFLUENZA VACCINE RULE BASED  Completed     PNEUMOCOCCAL CONJUGATE VACCINE FOR ADULTS (PCV13 OR PREVNAR)  Completed        Subjective:   Chief Complaint: Anthony Evangelista is an 77 y.o. male here for an Annual Wellness visit.   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    Colonoscopy showed 2 tubular adenomatous colon polyps on 2017 colorectal surgery group presiding.  Still working questions regarding bowel movements.  Non-smoker.    No alcohol excess.  Allergies none known drug allergies.    Immunizations reviewed and up-to-date Prevnar 13 given left deltoid with last physical.    Immunizations reviewed and up-to-date history of diabetes mellitus type 2 with hyperlipidemia.    Bilateral hernia repair appendectomy and tonsillectomy as his surgeries.    Mother  cancer liver age  78.    Father  56 myocardial infarction.  One brother has been treated for cancer of the bladder still living.  One daughter  age 51 thoracic aortic aneurysm with complications.  One son with diverticulitis.  Wife living well.  Previously treated by this examiner for pneumonia.  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  Family business.    Review of Systems:    Please see above.  The rest of the review of systems are negative for all systems.    Patient Care Team:  Merlin Higuera MD as PCP - General     Patient Active Problem List   Diagnosis     Acne Vulgaris     Chest Pain     Type 2 Diabetes Mellitus - Uncomplicated, Uncontrolled     Benign Prostatic Hypertrophy     Sciatica     Urine Tests Nonspecific Abnormal Findings     Hyperlipidemia     Hyperglycemia     General medical exam     Routine general medical examination at a health care facility     Knee pain     No past medical history on file.   Past Surgical History:   Procedure Laterality Date     MI APPENDECTOMY      Description: Appendectomy;  Recorded: 2008;      No family history on file.   Social History     Social History     Marital status:      Spouse name: N/A     Number of children: N/A     Years of education: N/A     Occupational History     Not on file.     Social History Main Topics     Smoking status: Never Smoker     Smokeless tobacco: Never Used     Alcohol use No      Comment: rare     Drug use: No     Sexual activity: Not on file     Other Topics Concern     Not on file     Social History Narrative      Current Outpatient Prescriptions   Medication Sig Dispense Refill     ACCU-CHEK COMPACT PLUS TEST Strp Use 1 strip As Directed 2 (two) times a day.  11     metFORMIN (GLUCOPHAGE) 500 MG tablet TAKE 1 TABLET BY MOUTH EVERY DAY AS DIRECTED 90 tablet 0     MV-MN/FA/VIT K/LYCOP/LUT/ZEAXA (OCUVITE EYE + MULTI ORAL) Take by mouth.       ONE TOUCH DELICA LANCETS MISC Use As Directed. Test twice daily.       simvastatin  "(ZOCOR) 40 MG tablet TAKE 1 TABLET BY MOUTH EVERY DAY 90 tablet 0     melatonin 3 mg Tab tablet Take 3 mg by mouth at bedtime as needed (two tablets at bedtime).       No current facility-administered medications for this visit.       Objective:   Vital Signs:   Visit Vitals     /70 (Patient Site: Right Arm, Patient Position: Sitting)     Pulse 69     Ht 5' 6.5\" (1.689 m)     Wt 152 lb (68.9 kg)     SpO2 98%     BMI 24.17 kg/m2        VisionScreening:  No exam data present     PHYSICAL EXAM  Chest clear to auscultation and percussion.  Heart tones regular rhythm without murmur rub or gallop.  Abdomen soft nontender no organomegaly.  No peritoneal signs.  Extremities free of edema cyanosis or clubbing.  Neck veins nondistended no thyromegaly or scleral icterus noted, carotids full.  Skin warm and dry easily conversant good spirited.  Normal intelligence.  Neurologically intact no gross localizing findings.  Rest of examination negative in its entirety including negative skin lymph neuropsych HEENT back straight no severe spine tenderness genital rectal exam negative distal pulses lower extremities difficult to palpate but present rectal showed enlarged prostate without nodularity.    Because of the positive family history his own history of diabetes mellitus type 2 and hyperlipidemia I did suggest formal cardiac consultation his father  in his 50s of for premature coronary event.  The patient is without chest pain shortness of breath palpitations or exertional syncope at this juncture.  He denies paroxysmal nocturnal dyspnea and otherwise feels well.  He enjoys playing golf.  [facility].    Assessment Results 2018   Activities of Daily Living No help needed   Instrumental Activities of Daily Living No help needed   Get Up and Go Score Less than 12 seconds   Mini Cog Total Score 5   Some recent data might be hidden     A Mini-Cog score of 0-2 suggests the possibility of dementia, score of 3-5 suggests no " dementia    Identified Health Risks:     The patient was provided with written information regarding signs of hearing loss.  Patient's advanced directive was discussed and I am comfortable with the patient's wishes.

## 2021-06-18 NOTE — LETTER
Letter by Merlin Higuera MD at      Author: Merlin Higuera MD Service: -- Author Type: --    Filed:  Encounter Date: 2/1/2019 Status: (Other)       Anthony Evangelista  [address] [facility] [address]             February 1, 2019         Dear Mr. Evangelista,    Below are the results from your recent visit:    Resulted Orders   Glycosylated Hemoglobin A1c   Result Value Ref Range    Hemoglobin A1c 7.4 (H) 3.5 - 6.0 %   Glucose   Result Value Ref Range    Glucose 163 (H) 70 - 125 mg/dL    Patient Fasting > 8hrs? Yes     Narrative    Fasting Glucose reference range is 70-99 mg/dL per  American Diabetes Association (ADA) guidelines.   Lipid Cascade   Result Value Ref Range    Cholesterol 162 <=199 mg/dL    Triglycerides 86 <=149 mg/dL    HDL Cholesterol 65 >=40 mg/dL    LDL Calculated 80 <=129 mg/dL    Patient Fasting > 8hrs? Yes        All very good results    Please call with questions or contact us using [employer].    Sincerely,        Electronically signed by Merlin Higuera MD

## 2021-06-19 NOTE — LETTER
Letter by Merlin Higuera MD at      Author: Merlin Higuera MD Service: -- Author Type: --    Filed:  Encounter Date: 5/3/2019 Status: (Other)         Anthony Evangelista  [address] [facility] [address]             May 3, 2019         Dear Mr. Evangelista,    Below are the results from your recent visit:    Resulted Orders   Glycosylated Hemoglobin A1c   Result Value Ref Range    Hemoglobin A1c 8.1 (H) 3.5 - 6.0 %   Glucose   Result Value Ref Range    Glucose 165 (H) 70 - 125 mg/dL    Patient Fasting > 8hrs? Yes     Narrative    Fasting Glucose reference range is 70-99 mg/dL per  American Diabetes Association (ADA) guidelines.   Microalbumin, Random Urine   Result Value Ref Range    Microalbumin, Random Urine 0.94 0.00 - 1.99 mg/dL    Creatinine, Urine 173.2 mg/dL    Microalbumin/Creatinine Ratio Random Urine 5.4 <=19.9 mg/g    Narrative    Microalbumin, Random Urine  <2.0 mg/dL . . . . . . . . Normal  3.0-30.0 mg/dL . . . . . . Microalbuminuria  >30.0 mg/dL . . . . . .  . Clinical Proteinuria  Microalbumin/Creatinine Ratio, Random Urine  <20 mg/g . . . . .. . . . Normal   mg/g . . . . . . . Microalbuminuria  >300 mg/g . . . . . . . . Clinical Proteinuria   Lipid Cascade   Result Value Ref Range    Cholesterol 135 <=199 mg/dL    Triglycerides 81 <=149 mg/dL    HDL Cholesterol 55 >=40 mg/dL    LDL Calculated 64 <=129 mg/dL    Patient Fasting > 8hrs? Yes        All very good results    A1c is too high.  Prefer less than 8; more exercise and dietary restrictions needed.    Please call with questions or contact us using [employer].    Sincerely,        Electronically signed by Merlin Higuera MD

## 2021-06-19 NOTE — LETTER
Letter by Merlin Higuera MD at      Author: Merlin Higuera MD Service: -- Author Type: --    Filed:  Encounter Date: 8/5/2019 Status: (Other)         Anthony Evangelista  [address] [facility] [address]             August 5, 2019         Dear Mr. Evangelista,    Below are the results from your recent visit:    Resulted Orders   HM2(CBC w/o Differential)   Result Value Ref Range    WBC 6.9 4.0 - 11.0 thou/uL    RBC 4.75 4.40 - 6.20 mill/uL    Hemoglobin 15.3 14.0 - 18.0 g/dL    Hematocrit 44.7 40.0 - 54.0 %    MCV 94 80 - 100 fL    MCH 32.3 27.0 - 34.0 pg    MCHC 34.2 32.0 - 36.0 g/dL    RDW 11.8 11.0 - 14.5 %    Platelets 202 140 - 440 thou/uL    MPV 7.2 7.0 - 10.0 fL   Comprehensive Metabolic Panel   Result Value Ref Range    Sodium 139 136 - 145 mmol/L    Potassium 4.0 3.5 - 5.0 mmol/L    Chloride 103 98 - 107 mmol/L    CO2 27 22 - 31 mmol/L    Anion Gap, Calculation 9 5 - 18 mmol/L    Glucose 174 (H) 70 - 125 mg/dL    BUN 18 8 - 28 mg/dL    Creatinine 1.30 0.70 - 1.30 mg/dL    GFR MDRD Af Amer >60 >60 mL/min/1.73m2    GFR MDRD Non Af Amer 54 (L) >60 mL/min/1.73m2    Bilirubin, Total 1.0 0.0 - 1.0 mg/dL    Calcium 9.4 8.5 - 10.5 mg/dL    Protein, Total 7.0 6.0 - 8.0 g/dL    Albumin 4.1 3.5 - 5.0 g/dL    Alkaline Phosphatase 81 45 - 120 U/L    AST 22 0 - 40 U/L    ALT 21 0 - 45 U/L    Narrative    Fasting Glucose reference range is 70-99 mg/dL per  American Diabetes Association (ADA) guidelines.   Lipid Cascade   Result Value Ref Range    Cholesterol 147 <=199 mg/dL    Triglycerides 81 <=149 mg/dL    HDL Cholesterol 57 >=40 mg/dL    LDL Calculated 74 <=129 mg/dL    Patient Fasting > 8hrs? Yes    Glycosylated Hemoglobin A1c   Result Value Ref Range    Hemoglobin A1c 8.0 (H) 3.5 - 6.0 %   Thyroid Stimulating Hormone (TSH)   Result Value Ref Range    TSH 1.92 0.30 - 5.00 uIU/mL   Microalbumin, Random Urine   Result Value Ref Range    Microalbumin, Random Urine 1.09 0.00 - 1.99 mg/dL    Creatinine,  Urine 148.7 mg/dL    Microalbumin/Creatinine Ratio Random Urine 7.3 <=19.9 mg/g    Narrative    Microalbumin, Random Urine  <2.0 mg/dL . . . . . . . . Normal  3.0-30.0 mg/dL . . . . . . Microalbuminuria  >30.0 mg/dL . . . . . .  . Clinical Proteinuria    Microalbumin/Creatinine Ratio, Random Urine  <20 mg/g . . . . .. . . . Normal   mg/g . . . . . . . Microalbuminuria  >300 mg/g . . . . . . . . Clinical Proteinuria           All very good results  ------ Urine culture came back good  A1c level is too high and would suggest closer attention to diabetic diet.  Also diabetic education may be an order.  I     Please call with questions or contact us using [employer].    Sincerely,        Electronically signed by Merlin Higuera MD

## 2021-06-19 NOTE — LETTER
Letter by Merlin Higuera MD at      Author: Merlin Higuera MD Service: -- Author Type: --    Filed:  Encounter Date: 12/9/2019 Status: Signed         Anthony Evangelista  [address] [facility] [address]             December 9, 2019         Dear Mr. Evangelista,    Below are the results from your recent visit:    Resulted Orders   Glycosylated Hemoglobin A1c   Result Value Ref Range    Hemoglobin A1c 7.7 (H) 3.5 - 6.0 %   Glucose   Result Value Ref Range    Glucose 178 (H) 70 - 125 mg/dL    Patient Fasting > 8hrs? Yes     Narrative    Fasting Glucose reference range is 70-99 mg/dL per  American Diabetes Association (ADA) guidelines.   Lipid Cascade   Result Value Ref Range    Cholesterol 121 <=199 mg/dL    Triglycerides 74 <=149 mg/dL    HDL Cholesterol 43 >=40 mg/dL    LDL Calculated 63 <=129 mg/dL    Patient Fasting > 8hrs? Yes        All very good results    Please call with questions or contact us using [employer].    Sincerely,        Electronically signed by Merlin Higuera MD

## 2021-06-20 ENCOUNTER — HEALTH MAINTENANCE LETTER (OUTPATIENT)
Age: 81
End: 2021-06-20

## 2021-06-20 NOTE — LETTER
Letter by Merlin Higuera MD at      Author: Merlin Higuera MD Service: -- Author Type: --    Filed:  Encounter Date: 8/26/2020 Status: (Other)         Anthony Evagnelista  [address] [facility] [address]             August 26, 2020         Dear Mr. Evangelista,    Below are the results from your recent visit:    Resulted Orders   Glycosylated Hemoglobin A1c   Result Value Ref Range    Hemoglobin A1c 6.9 (H) <=5.6 %      Comment:      Normal <5.7% Prediabete 5.7-6.4% Diabletes 6.5% or higher - adopted from ADA consensus guidelines   Glucose   Result Value Ref Range    Glucose 140 (H) 70 - 125 mg/dL    Patient Fasting > 8hrs? Unknown     Narrative    Fasting Glucose reference range is 70-99 mg/dL per  American Diabetes Association (ADA) guidelines.   Microalbumin, Random Urine   Result Value Ref Range    Microalbumin, Random Urine 0.71 0.00 - 1.99 mg/dL    Creatinine, Urine 71.9 mg/dL    Microalbumin/Creatinine Ratio Random Urine 9.9 <=19.9 mg/g    Narrative    Microalbumin, Random Urine  <2.0 mg/dL . . . . . . . . Normal  3.0-30.0 mg/dL . . . . . . Microalbuminuria  >30.0 mg/dL . . . . . .  . Clinical Proteinuria    Microalbumin/Creatinine Ratio, Random Urine  <20 mg/g . . . . .. . . . Normal   mg/g . . . . . . . Microalbuminuria  >300 mg/g . . . . . . . . Clinical Proteinuria       Lipid Cascade   Result Value Ref Range    Cholesterol 156 <=199 mg/dL    Triglycerides 114 <=149 mg/dL    HDL Cholesterol 55 >=40 mg/dL    LDL Calculated 78 <=129 mg/dL    Patient Fasting > 8hrs? Unknown         All very good results     Please call with questions or contact us using [employer].    Sincerely,        Electronically signed by Merlin Higuera MD

## 2021-06-21 NOTE — LETTER
Letter by Merlin Higuera MD at      Author: Merlin Higuera MD Service: -- Author Type: --    Filed:  Encounter Date: 3/29/2021 Status: (Other)         Anthony Evangelista  [address] [facility] [address]             March 29, 2021         Dear Mr. Evangelista,    Below are the results from your recent visit:    Resulted Orders   HM2(CBC w/o Differential)   Result Value Ref Range    WBC 8.5 4.0 - 11.0 thou/uL    RBC 4.80 4.40 - 6.20 mill/uL    Hemoglobin 15.2 14.0 - 18.0 g/dL    Hematocrit 44.3 40.0 - 54.0 %    MCV 92 80 - 100 fL    MCH 31.7 27.0 - 34.0 pg    MCHC 34.3 32.0 - 36.0 g/dL    RDW 12.9 11.0 - 14.5 %    Platelets 234 140 - 440 thou/uL    MPV 9.9 7.0 - 10.0 fL   Comprehensive Metabolic Panel   Result Value Ref Range    Sodium 138 136 - 145 mmol/L    Potassium 4.2 3.5 - 5.0 mmol/L    Chloride 101 98 - 107 mmol/L    CO2 26 22 - 31 mmol/L    Anion Gap, Calculation 11 5 - 18 mmol/L    Glucose 188 (H) 70 - 125 mg/dL    BUN 22 8 - 28 mg/dL    Creatinine 1.30 0.70 - 1.30 mg/dL    GFR MDRD Af Amer >60 >60 mL/min/1.73m2    GFR MDRD Non Af Amer 53 (L) >60 mL/min/1.73m2    Bilirubin, Total 0.8 0.0 - 1.0 mg/dL    Calcium 9.2 8.5 - 10.5 mg/dL    Protein, Total 7.4 6.0 - 8.0 g/dL    Albumin 4.4 3.5 - 5.0 g/dL    Alkaline Phosphatase 90 45 - 120 U/L    AST 28 0 - 40 U/L    ALT 25 0 - 45 U/L    Narrative    Fasting Glucose reference range is 70-99 mg/dL per  American Diabetes Association (ADA) guidelines.   Lipid Cascade   Result Value Ref Range    Cholesterol 162 <=199 mg/dL    Triglycerides 98 <=149 mg/dL    HDL Cholesterol 53 >=40 mg/dL    LDL Calculated 89 <=129 mg/dL    Patient Fasting > 8hrs? Yes    Glycosylated Hemoglobin A1c   Result Value Ref Range    Hemoglobin A1c 7.7 (H) <=5.6 %   Thyroid Stimulating Hormone (TSH)   Result Value Ref Range    TSH 2.60 0.30 - 5.00 uIU/mL   PSA (Prostatic-Specific Antigen), Annual Screen   Result Value Ref Range    PSA 4.7 0.0 - 6.5 ng/mL    Narrative     Method is Abbott Prostate-Specific Antigen (PSA)  Standard-WHO 1st International (90:10)       All very good results    Please call with questions or contact us using My[employer].    Sincerely,        Electronically signed by Merlin Higuera MD

## 2021-06-21 NOTE — PROGRESS NOTES
Office Visit - Follow up    Anthony Evangelista   78 y.o. male    Date of Visit: 10/29/2018    Chief Complaint   Patient presents with     Hyperlipidemia     Diabetes     fasting       Subjective: Diabetes mellitus type 2 with hyperlipidemia and hypertension.  No known drug allergies.  Offers no new complaints denies chest pain or shortness of breath no blood in stool or urine.  Anticipates seeing Dr. Merlin Mitchell from [facility] Department of cardiology within the next day or so.    No shortness of breath with exertion and denies paroxysmal nocturnal dyspnea no leg edema.  Medication list reviewed tolerated normal effects no blood in stool or urine.  Medication list reviewed well-tolerated.    Colonoscopy dated November 17, 2017 showed 2 tubular adenomatous colon polyps with colorectal surgery group.    ROS: A comprehensive review of systems was performed and was otherwise negative    Medications:  Prior to Admission medications    Medication Sig Start Date End Date Taking? Authorizing Provider   ACCU-CHEK COMPACT PLUS TEST Strp Use 1 strip As Directed 2 (two) times a day. 2/17/17  Yes PROVIDER, HISTORICAL   metFORMIN (GLUCOPHAGE) 500 MG tablet TAKE 1 TABLET BY MOUTH EVERY DAY AS DIRECTED 6/26/18  Yes Merlin Higuera MD   MV-MN/FA/VIT K/LYCOP/LUT/ZEAXA (OCUVITE EYE + MULTI ORAL) Take by mouth.   Yes PROVIDER, HISTORICAL   simvastatin (ZOCOR) 40 MG tablet TAKE 1 TABLET BY MOUTH EVERY DAY 9/24/18  Yes Merlin Higuera MD   melatonin 3 mg Tab tablet Take 3 mg by mouth at bedtime as needed (two tablets at bedtime).    PROVIDER, HISTORICAL   ONE TOUCH DELICA LANCETS MISC Use As Directed. Test twice daily. 2/18/11   Merlin Higuera MD   metFORMIN (GLUCOPHAGE) 500 MG tablet TAKE 1 TABLET BY MOUTH EVERY DAY AS DIRECTED 6/23/17 10/29/18  Merlin Higuera MD   metFORMIN (GLUCOPHAGE) 500 MG tablet TAKE 1 TABLET BY MOUTH EVERY DAY AS DIRECTED 9/24/18 10/29/18  Merlin Higuera MD   simvastatin  (ZOCOR) 40 MG tablet TAKE 1 TABLET BY MOUTH EVERY DAY 6/23/17 10/29/18  Merlin Higuera MD   simvastatin (ZOCOR) 40 MG tablet TAKE 1 TABLET BY MOUTH EVERY DAY 6/26/18 10/29/18  Merlin Higuera MD       Allergies: No Known Allergies    Immunizations:   Immunization History   Administered Date(s) Administered     DT (pediatric) 03/04/2004     Influenza high dose, seasonal 10/21/2015, 10/17/2016, 10/24/2017, 10/08/2018     Influenza, inj, historic,unspecified 11/06/2007, 10/07/2008, 09/22/2009, 10/08/2010, 10/28/2011, 10/22/2014     Influenza, seasonal,quad inj 6-35 mos 10/05/2012, 10/22/2013     Pneumo Conj 13-V (2010&after) 12/10/2015     Pneumo Polysac 23-V 10/25/2006     Td,adult,historic,unspecified 03/04/2004     Tdap 07/15/2013       Exam Chest clear to auscultation and percussion.  Heart tones regular rhythm without murmur rub or gallop.  Abdomen soft nontender no organomegaly.  No peritoneal signs.  Extremities free of edema cyanosis or clubbing.  Neck veins nondistended no thyromegaly or scleral icterus noted, carotids full.  Skin warm and dry easily conversant good spirited.  Normal intelligence.  Neurologically intact no gross localizing findings.  Old acneiform scars no active acne vulgaris noted.  Not in acute distress not toxic no central or acrocyanosis noted respiratory rate 18 unlabored pulse 63 regular blood pressure 136/74 O2 sats 99% BMI is ideal at 24.4 exercises regularly enjoys playing [employer]    Assessment and Plan  Diabetes mellitus type 2 with hyperlipidemia.  No history of MI or stroke.  Cardiology consultation pending with Dr. Merlin Mitchell at [facility].  Check A1c blood sugar lipid panel today.    Colon polyps tubular adenomatous in type see colonoscopy report November 17, 2017 colorectal surgery group presiding Dr. FUNEZ.    Time: total time spent with the patient was 25 minutes of which >50% was spent in counseling and coordination of  care        Merlin Higuera MD    Patient Active Problem List   Diagnosis     Acne Vulgaris     Chest Pain     Type 2 Diabetes Mellitus - Uncomplicated, Uncontrolled     Benign Prostatic Hypertrophy     Sciatica     Urine Tests Nonspecific Abnormal Findings     Hyperlipidemia     Hyperglycemia     General medical exam     Routine general medical examination at a health care facility     Knee pain

## 2021-06-21 NOTE — PROGRESS NOTES
[facility] Office Consult     Assessment:   1.  Atypical chest discomfort.  Patient describes frequent episode of discomfort in his left upper chest that is nonexertional and I suspect not related to angina.  He also describes some intermittent numbness in his left arm again nonexertional.  He does have risk factors for coronary artery disease including family history, type 2 diabetes mellitus and treated hyperlipidemia.  We are going to plan an exercise stress echocardiogram and will comment once this is available for review.  I did review a coronary calcium scoring and he wanted to wait until the results of the stress echocardiogram to discuss further.    2.  Risk factor modification.  Pressure was mildly high here in the office today and have asked him to monitor his blood pressure over the next few weeks.  Recent lipid profile June 2018 finds an LDL cholesterol of 77 with a total cholesterol of 148.  We discussed potential utilization of coronary calcium scoring and will discuss further pending the outcome of the test described.  1. Chest discomfort  ECG Clinic - Today    Echo Stress Exercise      Plan:   1.  Exercise stress echocardiogram.  2.  Follow-up in 3 months.        History of Present Illness:   Thank you for asking the [facility] team to see Anthony Evangelista a 78 y.o.  male  in consultation  to evaluate atypical chest discomfort and risk factors for coronary artery disease..  He reports an occasional non-exertional numbness in his left arm that occurs at rest and is not result in any specific associated symptoms.  He also reports an infrequent episode of chest discomfort over the left upper chest and into his left shoulder.  This is nonexertional and does not sound overly consistent with angina.  He reports being active and walks 10,000 steps most days without difficulty.  He denies orthopnea or PND.  He denies dizziness or lightheadedness.  He denies syncope or near  "syncope.    He has a family history of heart disease.  He reports that his father had a heart attack at age 49 and  at age 56.  He has a daughter who has a history of coarctation and  of a stroke in her 50s.    Cardiac risk factors.  He does not smoke cigarettes but occasionally smokes cigars.  He does not drink significant alcohol.  He is a type II diabetic.  He denies hypertension.  He is on cholesterol-lowering medication.  Family history is as described with a father who had a heart attack at age 49 and  at age 56 and a daughter with coarctation and subsequent stroke after repair.    Past Medical History:   Type 2 diabetes mellitus  Hyperlipidemia  Chronic polyps  Past Surgical History:     Past Surgical History:   Procedure Laterality Date     [place] APPENDECTOMY      Description: Appendectomy;  Recorded: 2008;   Hernia repair    Family History:   As described above    Social History:    reports that he has never smoked. He has never used smokeless tobacco. He reports that he does not drink alcohol or use illicit drugs.  The primary care physician is Merlin Higuera MD  Meds:   Scheduled Meds:  Current Outpatient Prescriptions   Medication Sig Dispense Refill     ACCU-CHEK COMPACT PLUS TEST Strp Use 1 strip As Directed 2 (two) times a day.  11     metFORMIN (GLUCOPHAGE) 500 MG tablet TAKE 1 TABLET BY MOUTH EVERY DAY AS DIRECTED 90 tablet 0     MV-MN/FA/VIT K/LYCOP/LUT/ZEAXA (OCUVITE EYE + MULTI ORAL) Take by mouth.       ONE TOUCH DELICA LANCETS MISC Use As Directed. Test twice daily.       simvastatin (ZOCOR) 40 MG tablet TAKE 1 TABLET BY MOUTH EVERY DAY 90 tablet 0     melatonin 3 mg Tab tablet Take 3 mg by mouth at bedtime as needed (two tablets at bedtime).       No current facility-administered medications for this visit.        PRN Meds:.    Allergies:   Review of patient's allergies indicates no known allergies.          Objective:      Physical Exam  154 lb (69.9 kg)  5' 6.5\" (1.689 " "m)  Body mass index is 24.48 kg/(m^2).  /76 (Patient Site: Left Arm, Patient Position: Sitting, Cuff Size: Adult Regular)  Pulse 64  Resp 16  Ht 5' 6.5\" (1.689 m)  Wt 154 lb (69.9 kg)  BMI 24.48 kg/m2  138/80 left arm, 128/80 right arm  General Appearance:   Alert, cooperative and in no acute distress.   HEENT:  No scleral icterus; the mucous membranes were pink and moist.   Neck: JVP within normal limits. No thyromegaly. No HJR   Chest: The spine was straight. The chest was symmetric.   Lungs:   Respirations unlabored; the lungs are clear to auscultation.   Cardiovascular:   S1 and S2 without murmur, clicks or rubs. Brachial, radial, carotid and posterior tibial pulses are intact and symetrical.  No carotid bruits noted   Abdomen:  No organomegaly, masses, bruits, or tenderness. Bowels sounds are present   Extremities: No cyanosis, clubbing, or edema.   Skin: No xanthelasma.   Neurologic: Mood and affect are appropriate.             Lab Reviewed Personally by myself    Lab Results   Component Value Date     06/19/2018    K 4.3 06/19/2018     06/19/2018    CO2 25 06/19/2018    BUN 18 06/19/2018    CREATININE 1.29 06/19/2018    CALCIUM 9.5 06/19/2018     Lab Results   Component Value Date    CHOL 148 06/19/2018    TRIG 83 06/19/2018    HDL 54 06/19/2018     No results found for: BNP  Creatinine (mg/dL)   Date Value   06/19/2018 1.29   03/23/2017 1.17   05/06/2016 1.30   12/10/2015 1.35 (H)     LDL Calculated (mg/dL)   Date Value   06/19/2018 77   11/30/2017 95   09/22/2017 76     Lab Results   Component Value Date    WBC 7.3 06/19/2018    WBC 8.1 02/09/2015    HGB 15.6 06/19/2018    HCT 44.9 06/19/2018    MCV 92 06/19/2018     06/19/2018       Cardiac Testing:          ECG personally reviewed by myself shows normal sinus rhythm, normal EKG.         Review of Systems:       Review of Systems:   General: WNL  Eyes: WNL  Ears/Nose/Throat: WNL  Lungs: WNL  Heart: Arm Pain  Stomach: " WNL  Bladder: WNL  Muscle/Joints: WNL  Skin: WNL  Nervous System: WNL  Mental Health: WNL     Blood: WNL      This note has been dictated using voice recognition software. Any grammatical or context distortions are unintentional and inherent to the software.

## 2021-06-23 ENCOUNTER — RECORDS - HEALTHEAST (OUTPATIENT)
Dept: HEALTH INFORMATION MANAGEMENT | Facility: CLINIC | Age: 81
End: 2021-06-23

## 2021-06-23 NOTE — PROGRESS NOTES
Office Visit - Follow up    Anthony Evangelista   78 y.o. male    Date of Visit: 1/31/2019    Chief Complaint   Patient presents with     Diabetes     Hypertension       Subjective ; diabetes mellitus type II plus hyperlipidemia and hypertension.    No blood in the stool or urine medication list reviewed well-tolerated the patient denies any chest pain or shortness of breath.  He had a full cardiac workup with Dr. Merlin Mitchell from [facility] Department of cardiology this is all clear including stress echocardiogram.    Colonoscopy showed 2 tubular adenomatous colon polyps on November 17, 2017 with Dr[facility]    ROS: A comprehensive review of systems was performed and was otherwise negative    Medications:  Prior to Admission medications    Medication Sig Start Date End Date Taking? Authorizing Provider   melatonin 5 mg cap Take by mouth.   Yes PROVIDER, HISTORICAL   metFORMIN (GLUCOPHAGE) 500 MG tablet TAKE 1 TABLET BY MOUTH EVERY DAY AS DIRECTED 6/26/18  Yes Merlin Higuera MD   MV-MN/FA/VIT K/LYCOP/LUT/ZEAXA (OCUVITE EYE + MULTI ORAL) Take by mouth.   Yes PROVIDER, HISTORICAL   ONE TOUCH DELICA LANCETS MISC Use As Directed. Test twice daily. 2/18/11  Yes Merlin Higuera MD   simvastatin (ZOCOR) 40 MG tablet TAKE 1 TABLET BY MOUTH EVERY DAY 12/29/18  Yes Merlin Higuera MD   melatonin 3 mg Tab tablet Take 3 mg by mouth at bedtime as needed (two tablets at bedtime).  1/31/19 Yes PROVIDER, HISTORICAL   ACCU-CHEK COMPACT PLUS TEST Strp Use 1 strip As Directed 2 (two) times a day. 2/17/17   PROVIDER, HISTORICAL   metFORMIN (GLUCOPHAGE) 500 MG tablet TAKE 1 TABLET BY MOUTH EVERY DAY AS DIRECTED 12/29/18 1/31/19  Merlin Higuera MD   simvastatin (ZOCOR) 40 MG tablet TAKE 1 TABLET BY MOUTH EVERY DAY 9/24/18 1/31/19  Merlin Higuera MD       Allergies: No Known Allergies    Immunizations:   Immunization History   Administered Date(s) Administered     DT  (pediatric) 03/04/2004     Influenza high dose, seasonal 10/21/2015, 10/17/2016, 10/24/2017, 10/08/2018     Influenza, inj, historic,unspecified 11/06/2007, 10/07/2008, 09/22/2009, 10/08/2010, 10/28/2011, 10/22/2014     Influenza, seasonal,quad inj 6-35 mos 10/05/2012, 10/22/2013     Pneumo Conj 13-V (2010&after) 12/10/2015     Pneumo Polysac 23-V 10/25/2006     Td,adult,historic,unspecified 03/04/2004     Tdap 07/15/2013       Exam Chest clear to auscultation and percussion.  Heart tones regular rhythm without murmur rub or gallop.  Abdomen soft nontender no organomegaly.  No peritoneal signs.  Extremities free of edema cyanosis or clubbing.  Neck veins nondistended no thyromegaly or scleral icterus noted, carotids full.  Skin warm and dry easily conversant good spirited.  Normal intelligence.  Neurologically intact no gross localizing findings.  No carotid bruits easily conversant medication list reviewed well-tolerated no known drug allergies.    Assessment and Plan  Diabetes mellitus type 2 check A1c blood sugar lipid.    Colon polyps tubular adenomatous in type.    Hyperlipidemia on statin therapy no recent history of MI or stroke.        Time: total time spent with the patient was 25 minutes of which >50% was spent in counseling and coordination of care    The following high BMI interventions were performed this visit: encouragement to exercise    Merlin Higuera MD    Patient Active Problem List   Diagnosis     Acne Vulgaris     Chest Pain     Type 2 Diabetes Mellitus - Uncomplicated, Uncontrolled     Benign Prostatic Hypertrophy     Sciatica     Urine Tests Nonspecific Abnormal Findings     Hyperlipidemia     Hyperglycemia     General medical exam     Routine general medical examination at a health care facility     Knee pain

## 2021-06-27 ENCOUNTER — COMMUNICATION - HEALTHEAST (OUTPATIENT)
Dept: PHYSICAL MEDICINE AND REHAB | Facility: CLINIC | Age: 81
End: 2021-06-27

## 2021-06-27 DIAGNOSIS — M54.16 LUMBAR RADICULOPATHY: ICD-10-CM

## 2021-06-27 DIAGNOSIS — G89.29 CHRONIC BILATERAL LOW BACK PAIN WITH BILATERAL SCIATICA: ICD-10-CM

## 2021-06-27 DIAGNOSIS — M54.41 CHRONIC BILATERAL LOW BACK PAIN WITH BILATERAL SCIATICA: ICD-10-CM

## 2021-06-27 DIAGNOSIS — M54.42 CHRONIC BILATERAL LOW BACK PAIN WITH BILATERAL SCIATICA: ICD-10-CM

## 2021-06-27 DIAGNOSIS — M48.061 LUMBAR FORAMINAL STENOSIS: ICD-10-CM

## 2021-06-28 RX ORDER — GABAPENTIN 300 MG/1
CAPSULE ORAL
Qty: 270 CAPSULE | Refills: 1 | Status: SHIPPED | OUTPATIENT
Start: 2021-06-28 | End: 2021-12-14

## 2021-07-03 NOTE — ADDENDUM NOTE
Addendum Note by Liat Bean, Diabetes Ed at 9/3/2019  9:00 AM     Author: Liat Bean Diabetes Ed Service: -- Author Type: Diabetes Ed    Filed: 9/3/2019 12:41 PM Encounter Date: 9/3/2019 Status: Signed    : Liat Bean Diabetes Ed (Diabetes Ed)    Addended by: LIAT BEAN on: 9/3/2019 12:41 PM        Modules accepted: Orders

## 2021-07-06 ENCOUNTER — COMMUNICATION - HEALTHEAST (OUTPATIENT)
Dept: INTERNAL MEDICINE | Facility: CLINIC | Age: 81
End: 2021-07-06

## 2021-07-06 VITALS — WEIGHT: 158 LBS | BODY MASS INDEX: 24.8 KG/M2 | HEIGHT: 67 IN

## 2021-07-06 DIAGNOSIS — E11.9 DIABETES (H): ICD-10-CM

## 2021-07-06 RX ORDER — SIMVASTATIN 40 MG
TABLET ORAL
Qty: 90 TABLET | Refills: 2 | Status: SHIPPED | OUTPATIENT
Start: 2021-07-06 | End: 2022-04-11

## 2021-07-06 NOTE — TELEPHONE ENCOUNTER
Telephone Encounter by Elizabeth Lemon RN at 7/6/2021  4:02 PM     Author: Elizabeth Lemon RN Service: -- Author Type: Registered Nurse    Filed: 7/6/2021  4:03 PM Encounter Date: 7/6/2021 Status: Signed    : Elizabeth Lemon RN (Registered Nurse)       Refill Approved    Rx renewed per Medication Renewal Policy. Medication was last renewed on 1/8/2021. Last visit 3/26/2021.     Elizabeth Lemon, [facility] Connection Triage/Med Refill 7/6/2021     Requested Prescriptions   Pending Prescriptions Disp Refills   ? simvastatin (ZOCOR) 40 MG tablet [Pharmacy Med Name: SIMVASTATIN 40MG TABLETS] 90 tablet 1     Sig: TAKE 1 TABLET(40 MG) BY MOUTH DAILY       Statins Refill Protocol (Hmg CoA Reductase Inhibitors) Passed - 7/6/2021 12:59 PM        Passed - PCP or prescribing provider visit in past 12 months      Last office visit with prescriber/PCP: 1/10/2020 Merlin Higuera MD OR same dept: Visit date not found OR same specialty: 1/10/2020 Merlin Higuera MD  Last physical: 3/26/2021 Last MTM visit: Visit date not found   Next visit within 3 mo: Visit date not found  Next physical within 3 mo: Visit date not found  Prescriber OR PCP: Merlin Higuera MD  Last diagnosis associated with med order: 1. Diabetes (H)  - simvastatin (ZOCOR) 40 MG tablet [Pharmacy Med Name: SIMVASTATIN 40MG TABLETS]; TAKE 1 TABLET(40 MG) BY MOUTH DAILY  Dispense: 90 tablet; Refill: 1    If protocol passes may refill for 12 months if within 3 months of last provider visit (or a total of 15 months).              Elizabeth Lemon RN  07/06/21 4:03 PM   [facility] Nurse Advisor

## 2021-07-12 ENCOUNTER — TRANSFERRED RECORDS (OUTPATIENT)
Dept: HEALTH INFORMATION MANAGEMENT | Facility: CLINIC | Age: 81
End: 2021-07-12

## 2021-08-03 ENCOUNTER — TRANSFERRED RECORDS (OUTPATIENT)
Dept: HEALTH INFORMATION MANAGEMENT | Facility: CLINIC | Age: 81
End: 2021-08-03

## 2021-08-24 ENCOUNTER — TRANSFERRED RECORDS (OUTPATIENT)
Dept: HEALTH INFORMATION MANAGEMENT | Facility: CLINIC | Age: 81
End: 2021-08-24
Payer: MEDICARE

## 2021-08-26 ENCOUNTER — TRANSFERRED RECORDS (OUTPATIENT)
Dept: HEALTH INFORMATION MANAGEMENT | Facility: CLINIC | Age: 81
End: 2021-08-26

## 2021-10-11 ENCOUNTER — HEALTH MAINTENANCE LETTER (OUTPATIENT)
Age: 81
End: 2021-10-11

## 2021-10-29 ENCOUNTER — IMMUNIZATION (OUTPATIENT)
Dept: FAMILY MEDICINE | Facility: CLINIC | Age: 81
End: 2021-10-29
Payer: MEDICARE

## 2021-10-29 PROCEDURE — 90662 IIV NO PRSV INCREASED AG IM: CPT

## 2021-10-29 PROCEDURE — G0008 ADMIN INFLUENZA VIRUS VAC: HCPCS

## 2021-11-25 DIAGNOSIS — E11.8 TYPE 2 DIABETES MELLITUS WITH COMPLICATION, WITHOUT LONG-TERM CURRENT USE OF INSULIN (H): ICD-10-CM

## 2021-11-26 NOTE — TELEPHONE ENCOUNTER
"Routing refill request to provider for review/approval because:  Labs not current:  A1C    Last Written Prescription Date:  11/30/20  Last Fill Quantity: 180,  # refills: 3   Last office visit provider:  3/26/21    Requested Prescriptions   Pending Prescriptions Disp Refills     metFORMIN (GLUCOPHAGE) 500 MG tablet [Pharmacy Med Name: METFORMIN 500MG TABLETS] 180 tablet 3     Sig: TAKE 1 TABLET(500 MG) BY MOUTH TWICE DAILY WITH MEALS       Biguanide Agents Failed - 11/25/2021  3:54 AM        Failed - Patient has documented A1c within the specified period of time.     If HgbA1C is 8 or greater, it needs to be on file within the past 3 months.  If less than 8, must be on file within the past 6 months.     Recent Labs   Lab Test 03/26/21  0813   A1C 7.7*             Passed - Patient is age 10 or older        Passed - Patient's CR is NOT>1.4 OR Patient's EGFR is NOT<45 within past 12 mos.     Recent Labs   Lab Test 03/26/21 0813   GFRESTIMATED 53*   GFRESTBLACK >60       Recent Labs   Lab Test 03/26/21  0813   CR 1.30             Passed - Patient does NOT have a diagnosis of CHF.        Passed - Medication is active on med list        Passed - Recent (6 mo) or future (30 days) visit within the authorizing provider's specialty     Patient had office visit in the last 6 months or has a visit in the next 30 days with authorizing provider or within the authorizing provider's specialty.  See \"Patient Info\" tab in inbasket, or \"Choose Columns\" in Meds & Orders section of the refill encounter.                 Leila Krishna RN 11/26/21 8:28 AM  "

## 2021-12-09 ENCOUNTER — TRANSFERRED RECORDS (OUTPATIENT)
Dept: HEALTH INFORMATION MANAGEMENT | Facility: CLINIC | Age: 81
End: 2021-12-09
Payer: MEDICARE

## 2021-12-14 ENCOUNTER — OFFICE VISIT (OUTPATIENT)
Dept: INTERNAL MEDICINE | Facility: CLINIC | Age: 81
End: 2021-12-14
Payer: MEDICARE

## 2021-12-14 VITALS
SYSTOLIC BLOOD PRESSURE: 116 MMHG | HEIGHT: 67 IN | WEIGHT: 152 LBS | HEART RATE: 71 BPM | DIASTOLIC BLOOD PRESSURE: 70 MMHG | OXYGEN SATURATION: 96 % | BODY MASS INDEX: 23.86 KG/M2

## 2021-12-14 DIAGNOSIS — E11.8 TYPE 2 DIABETES MELLITUS WITH COMPLICATION, WITHOUT LONG-TERM CURRENT USE OF INSULIN (H): Primary | ICD-10-CM

## 2021-12-14 LAB
CHOLEST SERPL-MCNC: 137 MG/DL
FASTING STATUS PATIENT QL REPORTED: ABNORMAL
FASTING STATUS PATIENT QL REPORTED: NORMAL
GLUCOSE BLD-MCNC: 181 MG/DL (ref 70–125)
HBA1C MFR BLD: 7.9 % (ref 0–5.6)
HDLC SERPL-MCNC: 55 MG/DL
LDLC SERPL CALC-MCNC: 66 MG/DL
TRIGL SERPL-MCNC: 82 MG/DL

## 2021-12-14 PROCEDURE — 36415 COLL VENOUS BLD VENIPUNCTURE: CPT | Performed by: INTERNAL MEDICINE

## 2021-12-14 PROCEDURE — 82947 ASSAY GLUCOSE BLOOD QUANT: CPT | Performed by: INTERNAL MEDICINE

## 2021-12-14 PROCEDURE — 80061 LIPID PANEL: CPT | Performed by: INTERNAL MEDICINE

## 2021-12-14 PROCEDURE — 99214 OFFICE O/P EST MOD 30 MIN: CPT | Performed by: INTERNAL MEDICINE

## 2021-12-14 PROCEDURE — 83036 HEMOGLOBIN GLYCOSYLATED A1C: CPT | Performed by: INTERNAL MEDICINE

## 2021-12-14 ASSESSMENT — MIFFLIN-ST. JEOR: SCORE: 1358.1

## 2021-12-14 NOTE — PROGRESS NOTES
"Assessment/Plan:    Diabetes mellitus type 2 check A1c blood sugar lipid panel blood sugars in the range of 130 at home fasting.  Stable.    Hypertension controlled 116/70.    Hyperlipidemia on statin therapy no history of MI or stroke.    Sciatica intermittent flares discussed reassured increase core strength may help.    25 minutes spent on the date of the encounter doing chart review, patient visit and documentation     Subjective:  Anthony Evangelista is a 81 year old male presents for the following health issues as above    ROS:  No chest pain or shortness of breath no blood in stool or urine    Objective:  /70 (BP Location: Right arm, Patient Position: Sitting)   Pulse 71   Ht 1.702 m (5' 7\")   Wt 68.9 kg (152 lb)   SpO2 96%   BMI 23.81 kg/m    Chest clear to auscultation and percussion.  Heart tones regular rhythm without murmur rub or gallop.  Abdomen soft nontender no organomegaly.  No peritoneal signs.  Extremities free of edema cyanosis or clubbing.  Neck veins nondistended no thyromegaly or scleral icterus noted, carotids full.  Skin warm and dry easily conversant good spirited.  Normal intelligence.  Neurologically intact no gross localizing findings.    "

## 2021-12-14 NOTE — LETTER
December 17, 2021      Anthony Evangelista  [address] [address]        Dear ,    We are writing to inform you of your test results.    All good    Resulted Orders   Glucose   Result Value Ref Range    Glucose 181 (H) 70 - 125 mg/dL    Patient Fasting > 8hrs? Unknown    Lipid panel reflex to direct LDL Fasting   Result Value Ref Range    Cholesterol 137 <=199 mg/dL    Triglycerides 82 <=149 mg/dL    Direct Measure HDL 55 >=40 mg/dL      Comment:      HDL Cholesterol Reference Range:     0-2 years:   No reference ranges established for patients under 2 years old  [facility].    2-8 years:  Greater than 45 mg/dL     18 years and older:   Female: Greater than or equal to 50 mg/dL   Male:   Greater than or equal to 40 mg/dL    LDL Cholesterol Calculated 66 <=129 mg/dL    Patient Fasting > 8hrs? Unknown    Hemoglobin A1c   Result Value Ref Range    Hemoglobin A1C 7.9 (H) 0.0 - 5.6 %      Comment:      Normal <5.7%   Prediabetes 5.7-6.4%    Diabetes 6.5% or higher     Note: Adopted from ADA consensus guidelines.       If you have any questions or concerns, please call the clinic at the number listed above.       Sincerely,      Merlin Higuera MD

## 2022-03-09 ENCOUNTER — TRANSFERRED RECORDS (OUTPATIENT)
Dept: HEALTH INFORMATION MANAGEMENT | Facility: CLINIC | Age: 82
End: 2022-03-09
Payer: MEDICARE

## 2022-03-29 ENCOUNTER — OFFICE VISIT (OUTPATIENT)
Dept: INTERNAL MEDICINE | Facility: CLINIC | Age: 82
End: 2022-03-29
Payer: MEDICARE

## 2022-03-29 VITALS
OXYGEN SATURATION: 97 % | SYSTOLIC BLOOD PRESSURE: 110 MMHG | WEIGHT: 154 LBS | BODY MASS INDEX: 24.75 KG/M2 | HEART RATE: 82 BPM | HEIGHT: 66 IN | DIASTOLIC BLOOD PRESSURE: 68 MMHG

## 2022-03-29 DIAGNOSIS — E11.8 TYPE 2 DIABETES MELLITUS WITH COMPLICATION, WITHOUT LONG-TERM CURRENT USE OF INSULIN (H): ICD-10-CM

## 2022-03-29 DIAGNOSIS — Z00.00 ROUTINE GENERAL MEDICAL EXAMINATION AT A HEALTH CARE FACILITY: Primary | ICD-10-CM

## 2022-03-29 DIAGNOSIS — Z12.5 SCREENING FOR PROSTATE CANCER: ICD-10-CM

## 2022-03-29 LAB
ALBUMIN SERPL-MCNC: 4.2 G/DL (ref 3.5–5)
ALBUMIN UR-MCNC: NEGATIVE MG/DL
ALP SERPL-CCNC: 80 U/L (ref 45–120)
ALT SERPL W P-5'-P-CCNC: 20 U/L (ref 0–45)
ANION GAP SERPL CALCULATED.3IONS-SCNC: 13 MMOL/L (ref 5–18)
APPEARANCE UR: CLEAR
AST SERPL W P-5'-P-CCNC: 23 U/L (ref 0–40)
BACTERIA #/AREA URNS HPF: ABNORMAL /HPF
BILIRUB SERPL-MCNC: 0.7 MG/DL (ref 0–1)
BILIRUB UR QL STRIP: NEGATIVE
BUN SERPL-MCNC: 22 MG/DL (ref 8–28)
CALCIUM SERPL-MCNC: 9.5 MG/DL (ref 8.5–10.5)
CHLORIDE BLD-SCNC: 100 MMOL/L (ref 98–107)
CHOLEST SERPL-MCNC: 148 MG/DL
CO2 SERPL-SCNC: 26 MMOL/L (ref 22–31)
COLOR UR AUTO: YELLOW
CREAT SERPL-MCNC: 1.26 MG/DL (ref 0.7–1.3)
ERYTHROCYTE [DISTWIDTH] IN BLOOD BY AUTOMATED COUNT: 13.1 % (ref 10–15)
FASTING STATUS PATIENT QL REPORTED: YES
GFR SERPL CREATININE-BSD FRML MDRD: 58 ML/MIN/1.73M2
GLUCOSE BLD-MCNC: 192 MG/DL (ref 70–125)
GLUCOSE UR STRIP-MCNC: NEGATIVE MG/DL
HBA1C MFR BLD: 7.9 % (ref 0–5.6)
HCT VFR BLD AUTO: 42.4 % (ref 40–53)
HDLC SERPL-MCNC: 47 MG/DL
HGB BLD-MCNC: 14.6 G/DL (ref 13.3–17.7)
HGB UR QL STRIP: NEGATIVE
KETONES UR STRIP-MCNC: NEGATIVE MG/DL
LDLC SERPL CALC-MCNC: 83 MG/DL
LEUKOCYTE ESTERASE UR QL STRIP: ABNORMAL
MCH RBC QN AUTO: 31.7 PG (ref 26.5–33)
MCHC RBC AUTO-ENTMCNC: 34.4 G/DL (ref 31.5–36.5)
MCV RBC AUTO: 92 FL (ref 78–100)
NITRATE UR QL: NEGATIVE
PH UR STRIP: 6 [PH] (ref 5–8)
PLATELET # BLD AUTO: 204 10E3/UL (ref 150–450)
POTASSIUM BLD-SCNC: 4.4 MMOL/L (ref 3.5–5)
PROT SERPL-MCNC: 7.2 G/DL (ref 6–8)
PSA SERPL-MCNC: 4.45 UG/L (ref 0–6.5)
RBC # BLD AUTO: 4.6 10E6/UL (ref 4.4–5.9)
RBC #/AREA URNS AUTO: ABNORMAL /HPF
SODIUM SERPL-SCNC: 139 MMOL/L (ref 136–145)
SP GR UR STRIP: 1.01 (ref 1–1.03)
SQUAMOUS #/AREA URNS AUTO: ABNORMAL /LPF
TRIGL SERPL-MCNC: 91 MG/DL
TSH SERPL DL<=0.005 MIU/L-ACNC: 3.1 UIU/ML (ref 0.3–5)
UROBILINOGEN UR STRIP-ACNC: 0.2 E.U./DL
WBC # BLD AUTO: 8.2 10E3/UL (ref 4–11)
WBC #/AREA URNS AUTO: ABNORMAL /HPF

## 2022-03-29 PROCEDURE — 80053 COMPREHEN METABOLIC PANEL: CPT | Performed by: INTERNAL MEDICINE

## 2022-03-29 PROCEDURE — G0103 PSA SCREENING: HCPCS | Performed by: INTERNAL MEDICINE

## 2022-03-29 PROCEDURE — 80061 LIPID PANEL: CPT | Performed by: INTERNAL MEDICINE

## 2022-03-29 PROCEDURE — G0439 PPPS, SUBSEQ VISIT: HCPCS | Performed by: INTERNAL MEDICINE

## 2022-03-29 PROCEDURE — 85027 COMPLETE CBC AUTOMATED: CPT | Performed by: INTERNAL MEDICINE

## 2022-03-29 PROCEDURE — 83036 HEMOGLOBIN GLYCOSYLATED A1C: CPT | Performed by: INTERNAL MEDICINE

## 2022-03-29 PROCEDURE — 84443 ASSAY THYROID STIM HORMONE: CPT | Performed by: INTERNAL MEDICINE

## 2022-03-29 PROCEDURE — 36415 COLL VENOUS BLD VENIPUNCTURE: CPT | Performed by: INTERNAL MEDICINE

## 2022-03-29 PROCEDURE — 81001 URINALYSIS AUTO W/SCOPE: CPT | Performed by: INTERNAL MEDICINE

## 2022-03-29 ASSESSMENT — ENCOUNTER SYMPTOMS: BACK PAIN: 1

## 2022-03-29 NOTE — PROGRESS NOTES
Annual Wellness Visit:  Anthony Evangelista  is a 81 year old male  who presents for an annual wellness visit.  Annual wellness visit and physical examination diabetes mellitus type 2 with diabetic foot examination done today allCLEAR.    Sciatica left side leg cramps during the day and memory concerns for special words.    Colonoscopy dated November 17, 2017 showed 2 tubular adenomatous colon polyps with [facility] Dr. FUNEZ presiding.  No known drug allergies non-smoker no excess alcohol enjoys playing golf.    Assessment/Plan:  Annual wellness visit for this type II diabetic age 81 and screen for prostate cancer today check PSA A1c lipid panel hemogram comprehensive metabolic profile urinalysis.  TSH will also be checked in the usual comprehensive lab test done he is up-to-date on colonoscopy and he has received the COVID vaccine x3 including the COVID    Subjective:   Medical History: 19 booster.    Diabetes mellitus type 2 and diabetic foot examination done today allCLEAR.    Sciatica left side.    Problems finding the right word to say it certain times.    Leg cramps during the night discussed hydration and quinine water 4 ounces 4 times a day.    Memory concerns with insomnia sleeps 4 to 5 hours each night stable unchanged.  Light social alcohol non-smoker.    Prior history of appendectomy tonsillectomy.  No anesthetic complications with any prior surgery.  No past medical history on file.  Current Outpatient Medications   Medication     ACCU-CHEK COMPACT PLUS TEST Strp     metFORMIN (GLUCOPHAGE) 500 MG tablet     rv-ky-NK-vit K-qnmym-lutw-zeax (OCUVITE EYE PLUS MULTI) 200- mcg Tab     ONE TOUCH DELICA LANCETS MISC     simvastatin (ZOCOR) 40 MG tablet     No current facility-administered medications for this visit.     Immunization History   Administered Date(s) Administered     COVID-19,PF,Pfizer (12+ Yrs) 01/27/2021, 02/17/2021, 08/31/2021     DT (PEDS <7y) 03/04/2004     Flu, Unspecified  "2007, 10/07/2008, 2009, 10/08/2010, 10/28/2011, 10/22/2014     Influenza (High Dose) 3 valent vaccine 10/21/2015, 10/17/2016, 10/24/2017, 10/08/2018, 10/15/2019     Influenza Vaccine, 6+MO IM (QUADRIVALENT W/PRESERVATIVES) 10/05/2012, 10/22/2013     Influenza, Quad, High Dose, Pf, 65yr+ (Fluzone HD) 10/18/2020, 10/29/2021     Pneumo Conj 13-V (2010&after) 12/10/2015     Pneumococcal 23 valent 10/25/2006     Td,adult,historic,unspecified 2004     Tdap (Adacel,Boostrix) 07/15/2013       Surgical History:  Past Surgical History:   Procedure Laterality Date     [facility] APPENDECTOMY      Description: Appendectomy;  Recorded: 2008;        Family History:  Mother  cancer of the liver age 78.    Mother  myocardial infarction age 56.  1 brother  age 82 natural causes.    1 daughter  51 of a thoracic aortic aneurysm and its complications.    1 son remaining with history of diverticulosis.  Wife well prior history of pneumonia bronchiectasis.  Stable.  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    Health Maintenances:  Immunizations reviewed and up-to-date the patient is up-to-date with his COVID vaccine.  His colonoscopy is also up-to-date.    Objective:  /68 (BP Location: Right arm, Patient Position: Sitting)   Pulse 82   Ht 1.683 m (5' 6.25\")   Wt 69.9 kg (154 lb)   SpO2 97%   BMI 24.67 kg/m    Skin was clear lymph negative neuro negative psych normal easily conversant good spirited not in acute distress nontoxic intelligent.    Back straight no severe spine tenderness chest clear no rales rhonchi or wheezes heart tones regular rhythm without murmur rub or gallop neck veins are nondistended there are no carotid bruits there was no lymphadenopathy appreciated in bearing areas abdomen revealed mild centripetal obesity there was no organomegaly masses or tenderness bowel sounds are present but hypoactive genital rectal " examination was negative no groin hernias testicular scrotal exam negative prostate was enlarged but no nodularity the prostate was estimated to be 3/4 the patient has little or no symptoms of prostatism.  Extremities are free of edema cyanosis or clubbing distal pulses in all 4 extremities were intact and strong and again no carotid bruits.  The patient is pleasant conversant not in acute distress not toxic.  The patient is concerned regarding a number of things as outlined above similar to previous annual wellness exams.    Merlin Higuera MD    Internal Medicine

## 2022-03-29 NOTE — PROGRESS NOTES
SUBJECTIVE:   Anthony Evangelista is a 81 year old male who presents for Preventive Visit.      Patient has been advised of split billing requirements and indicates understanding: Yes  Are you in the first 12 months of your Medicare coverage?  No    Back Pain       Do you feel safe in your environment? Yes    Have you ever done Advance Care Planning? (For example, a Health Directive, POLST, or a discussion with a medical provider or your loved ones about your wishes): Yes, patient states has an Advance Care Planning document and will bring a copy to the clinic.       Fall risk  none      Cognitive Screening  Clock   Two   Words   Captain  Garden    Do you have sleep apnea, excessive snoring or daytime drowsiness?: no    Reviewed and updated as needed this visit by clinical staff   Tobacco                Reviewed and updated as needed this visit by Provider                 Social History     Tobacco Use     Smoking status: Never Smoker     Smokeless tobacco: Never Used   Substance Use Topics     Alcohol use: No     Comment: Alcoholic Drinks/day: rare     {Rooming Staff- Complete this question if Prescreen response is not shown below for today's visit. If you drink alcohol do you typically have >3 drinks per day or >7 drinks per week? (Optional):641350}    No flowsheet data found.{add AUDIT responses (Optional) (A score of 7 for adult men is an indication of hazardous drinking; a score of 8 or more is an indication of an alcohol use disorder.  A score of 7 or more for adult women is an indication of hazardous drinking or an alchohol use disorder):246935}    {Outside tests to abstract? :419550}    {additional problems to add (Optional):631618}    Current providers sharing in care for this patient include: {Rooming staff:  Please update Care Team in Rooming Activity, refresh this note and then delete this statement}  Patient Care Team:  Merlin Higuera MD as PCP - General  Merlin Higuera MD as Assigned  "PCP    The following health maintenance items are reviewed in Epic and correct as of today:  Health Maintenance Due   Topic Date Due     ANNUAL REVIEW OF HM ORDERS  Never done     ZOSTER IMMUNIZATION (1 of 2) Never done     MICROALBUMIN  2021     BMP  2022     MEDICARE ANNUAL WELLNESS VISIT  2022     {Chronicprobdata (optional):714398}  {Decision Support (Optional):677341}        Review of Systems   Musculoskeletal: Positive for back pain.     {ROS COMP (Optional):908597}    OBJECTIVE:   /68 (BP Location: Right arm, Patient Position: Sitting)   Pulse 82   Ht 1.683 m (5' 6.25\")   Wt 69.9 kg (154 lb)   SpO2 97%   BMI 24.67 kg/m   Estimated body mass index is 24.67 kg/m  as calculated from the following:    Height as of this encounter: 1.683 m (5' 6.25\").    Weight as of this encounter: 69.9 kg (154 lb).  Physical Exam  {Exam (Optional) :143186}    {Diagnostic Test Results (Optional):225423::\"Diagnostic Test Results:\",\"Labs reviewed in Epic\"}    ASSESSMENT / PLAN:   {Diag Picklist:728197}    {Patient advised of split billing (Optional):763219}    COUNSELING:  {Medicare Counselin}    Estimated body mass index is 24.67 kg/m  as calculated from the following:    Height as of this encounter: 1.683 m (5' 6.25\").    Weight as of this encounter: 69.9 kg (154 lb).    {Weight Management Plan (ACO) Complete if BMI is abnormal-  Ages 18-64  BMI >24.9.  Age 65+ with BMI <23 or >30 (Optional):741239}    He reports that he has never smoked. He has never used smokeless tobacco.      Appropriate preventive services were discussed with this patient, including applicable screening as appropriate for cardiovascular disease, diabetes, osteopenia/osteoporosis, and glaucoma.  As appropriate for age/gender, discussed screening for colorectal cancer, prostate cancer, breast cancer, and cervical cancer. Checklist reviewing preventive services available has been given to the patient.    Reviewed patients " plan of care and provided an AVS. The {CarePlan:274102} for Anthony meets the Care Plan requirement. This Care Plan has been established and reviewed with the {PATIENT, FAMILY MEMBER, CAREGIVER:478923}.    Counseling Resources:  ATP IV Guidelines  Pooled Cohorts Equation Calculator  Breast Cancer Risk Calculator  Breast Cancer: Medication to Reduce Risk  FRAX Risk Assessment  ICSI Preventive Guidelines  Dietary Guidelines for Americans, 2010  [employer]'s MyPlate  ASA Prophylaxis  Lung CA Screening    Merlin Higuera MD  [facility]    Identified Health Risks:

## 2022-03-29 NOTE — LETTER
March 31, 2022      Anthony Evangelista  [address] [facility] [address]        Dear ,    We are writing to inform you of your test results.    All very good    Resulted Orders   CBC with platelets   Result Value Ref Range    WBC Count 8.2 4.0 - 11.0 10e3/uL    RBC Count 4.60 4.40 - 5.90 10e6/uL    Hemoglobin 14.6 13.3 - 17.7 g/dL    Hematocrit 42.4 40.0 - 53.0 %    MCV 92 78 - 100 fL    MCH 31.7 26.5 - 33.0 pg    MCHC 34.4 31.5 - 36.5 g/dL    RDW 13.1 10.0 - 15.0 %    Platelet Count 204 150 - 450 10e3/uL   Comprehensive metabolic panel   Result Value Ref Range    Sodium 139 136 - 145 mmol/L    Potassium 4.4 3.5 - 5.0 mmol/L    Chloride 100 98 - 107 mmol/L    Carbon Dioxide (CO2) 26 22 - 31 mmol/L    Anion Gap 13 5 - 18 mmol/L    Urea Nitrogen 22 8 - 28 mg/dL    Creatinine 1.26 0.70 - 1.30 mg/dL    Calcium 9.5 8.5 - 10.5 mg/dL    Glucose 192 (H) 70 - 125 mg/dL    Alkaline Phosphatase 80 45 - 120 U/L    AST 23 0 - 40 U/L    ALT 20 0 - 45 U/L    Protein Total 7.2 6.0 - 8.0 g/dL    Albumin 4.2 3.5 - 5.0 g/dL    Bilirubin Total 0.7 0.0 - 1.0 mg/dL    GFR Estimate 58 (L) >60 mL/min/1.73m2      Comment:      Effective December 21, 2021 eGFRcr in adults is calculated using the 2021 CKD-EPI creatinine equation which includes age and gender (Rhoda et al., NEJM, DOI: 10.1056/MFGNpv3281609)   Hemoglobin A1c   Result Value Ref Range    Hemoglobin A1C 7.9 (H) 0.0 - 5.6 %      Comment:      Normal <5.7%   Prediabetes 5.7-6.4%    Diabetes 6.5% or higher     Note: Adopted from ADA consensus guidelines.   TSH   Result Value Ref Range    TSH 3.10 0.30 - 5.00 uIU/mL   Prostate Specific Antigen Screen   Result Value Ref Range    Prostate Specific Antigen Screen 4.45 0.00 - 6.50 ug/L    Narrative    Assay Method is Abbott Prostate-Specific Antigen (PSA)  Standard-WHO 1st International (90:10)   Lipid panel reflex to direct LDL Fasting   Result Value Ref Range    Cholesterol 148 <=199 mg/dL    Triglycerides 91  <=149 mg/dL    Direct Measure HDL 47 >=40 mg/dL      Comment:      HDL Cholesterol Reference Range:     0-2 years:   No reference ranges established for patients under 2 years old  at [facility] for lipid analytes.    2-8 years:  Greater than 45 mg/dL     18 years and older:   Female: Greater than or equal to 50 mg/dL   Male:   Greater than or equal to 40 mg/dL    LDL Cholesterol Calculated 83 <=129 mg/dL    Patient Fasting > 8hrs? Yes    UA reflex to Microscopic and Culture   Result Value Ref Range    Color Urine Yellow Colorless, Straw, Light Yellow, Yellow    Appearance Urine Clear Clear    Glucose Urine Negative Negative mg/dL    Bilirubin Urine Negative Negative    Ketones Urine Negative Negative mg/dL    Specific Gravity Urine 1.015 1.005 - 1.030    Blood Urine Negative Negative    pH Urine 6.0 5.0 - 8.0    Protein Albumin Urine Negative Negative mg/dL    Urobilinogen Urine 0.2 0.2, 1.0 E.U./dL    Nitrite Urine Negative Negative    Leukocyte Esterase Urine Trace (A) Negative   Urine Microscopic   Result Value Ref Range    Bacteria Urine Few (A) None Seen /HPF    RBC Urine None Seen 0-2 /HPF /HPF    WBC Urine 0-5 0-5 /HPF /HPF    Squamous Epithelials Urine Few (A) None Seen /LPF    Narrative    Urine Culture not indicated       If you have any questions or concerns, please call the clinic at the number listed above.       Sincerely,      Merlin Higuera MD

## 2022-04-09 DIAGNOSIS — E11.9 DIABETES (H): ICD-10-CM

## 2022-04-11 RX ORDER — SIMVASTATIN 40 MG
TABLET ORAL
Qty: 90 TABLET | Refills: 3 | Status: SHIPPED | OUTPATIENT
Start: 2022-04-11 | End: 2023-04-10

## 2022-04-11 NOTE — TELEPHONE ENCOUNTER
"Last Written Prescription Date:  7/6/2021  Last Fill Quantity: 90,  # refills: 2   Last office visit provider:  3/29/2022     Requested Prescriptions   Pending Prescriptions Disp Refills     simvastatin (ZOCOR) 40 MG tablet [Pharmacy Med Name: SIMVASTATIN 40MG TABLETS] 90 tablet 2     Sig: TAKE 1 TABLET(40 MG) BY MOUTH DAILY       Statins Protocol Passed - 4/11/2022  2:07 PM        Passed - LDL on file in past 12 months     Recent Labs   Lab Test 03/29/22  0725   LDL 83             Passed - No abnormal creatine kinase in past 12 months     No lab results found.             Passed - Recent (12 mo) or future (30 days) visit within the authorizing provider's specialty     Patient has had an office visit with the authorizing provider or a provider within the authorizing providers department within the previous 12 mos or has a future within next 30 days. See \"Patient Info\" tab in inbasket, or \"Choose Columns\" in Meds & Orders section of the refill encounter.              Passed - Medication is active on med list        Passed - Patient is age 18 or older             Elizabeth Lemon RN 04/11/22 2:07 PM  "

## 2022-05-03 NOTE — PROGRESS NOTES
Assessment:   Anthony Evangelista is a 81 year old y.o. male with past medical history significant for type 2 diabetes, benign prostatic hypertrophy, hyperlipidemia who presents today for follow-up regarding mild worsening of his chronic bilateral low back pain with left greater than right radicular/sciatica type leg symptoms.  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  His EMG showed possible chronic, inactive left S1 radiculopathy and a possible chronic inactive right L5 radiculopathy.  There is no electrodiagnostic evidence for large fiber peripheral neuropathy seen on his EMG.  He remains without any red flag symptoms.    Patient was last seen on May 25, 2021.    PSP:  Dr. Kathleen       Plan:     A shared decision making plan was used.  The patient's values and choices were respected.  The following represents what was discussed and decided upon by the physician and the patient.      1.  DIAGNOSTIC TESTS:    -His EMG report from May 12, 2021 was reviewed by the physician and is summarized as follows: There are prolonged motor unit potentials in the left gastroc muscle in combination with an abnormal left tibial H reflex.  All these findings are nonspecific and nondiagnostic.  Under the correct clinical condition, they can be observed in a chronic and inactive left S1 radiculopathy.  There is mild, low amplitude of the right peroneal motor study to the EDB, which is nonspecific.  This could represent a chronic and inactive right L5 radiculopathy versus a nonlocalizable peroneal neuropathy.  There is no active denervation along the peroneal innervated muscles in the right lower extremity.  No evidence for peripheral neuropathy in the bilateral lower extremities.  -His MRI of the lumbar spine from April 23, 2021 was personally reviewed today by the physician of the physician performing her own interpretation.  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  There is central canal stenosis at the L3-4 and L4-5 levels.  There is moderate central canal stenosis and lateral recess stenosis at the L2-3 level, which may impinge on the L3 nerves.  There is significant right lateral recess stenosis at the L5-S1 level, which may impinge on the right S1 nerve.  There is significant bilateral L5-S1 foraminal stenosis with more moderate bilateral foraminal stenosis seen at the L2-3, L3-4, L4-5 levels.  I did show him the image and explained the findings  2.  PHYSICAL THERAPY: He completed physical therapy for the low back and leg pain in 2019.  He is encouraged to continue doing his home exercise program on a regular basis.  We can consider repeat course of physical therapy in the future if needed.  3.  MEDICATIONS: At the last visit, he was prescribed gabapentin 300 mg and asked to titrate up to a maximum of 900 mg 3 times a day.  At this time, discontinue the medication as he states that it was ineffective even when he was taking 900 mg 3 times a day, which she stated that he took for 2 to 3 months before discontinuing it.  -I did offer him Lyrica, and he would like to try this medication after discussing the possible side effects.  Lyrica 100 mg, twice a day for 7 days and then increasing to Lyrica 100 mg 3 times a day was prescribed.  A PDMP check was run today and he is deemed low risk and appropriate for Lyrica use  4.  INTERVENTIONS:    -We have previously deferred the bilateral L5-S1 transforaminal epidural steroid injections as his pain was largely manageable.  At this time, he still does not feel like his pain is bad enough to warrant the injections, but he would like to be able to call to schedule he is if he gets to that point.  I did place an order for these that is good for the next 6 months and he can call to schedule these if his pain worsens within that time period.  I would be willing to place an order for an injection if he  calls further than 6 months out, as long as his symptoms are verified to be the same.  -If he fails to have relief of the bilateral L5-S1 transforaminal epidural steroid injections, we could consider bilateral S1-2 transforaminal epidural steroid injections.  5.  PATIENT EDUCATION:    -I did encourage him to continue working with his primary care physician to lower his hemoglobin A1c.  -I did show him the foraminal stenosis that is likely the cause of his pain.  -I did discuss all of the treatment options with him.  I explained the potential risks of a cortisone injection.  -Explained that I think that he will likely be able to avoid surgery, and if needed, we will be able to manage his pain with injections without progressing to surgery.  -I explained that many patients have at least 75% relief for at least 3 to 6 months.  -All of his questions were answered to his satisfaction today.  He was in agreement the treatment plan.  6.  FOLLOW-UP: Patient is welcome to follow-up as needed.  Again he can call to schedule the bilateral L5-S1 transforaminal epidural steroid injections in the next 6 months if he feels that his pain has worsened to the point that he would like to move forward with the injections if there are any questions/concerns or any significant worsening of pain, the patient is asked to call the clinic via the nurse navigation line or via [employer].     Subjective:     Anthony Evangelista is a 81 year old male who presents today for follow-up regarding chronic bilateral low back pain with bilateral radicular/sciatic type leg symptoms.  The patient was last seen in May 2021.  He reports that since he was last seen, his pain has been slightly worsening, but largely things remain manageable.  He says that his main reason in coming in today is that he wanted to discuss his injection options.  He states that several of his friends have had injections and he wanted to find out what the risks and potential benefits  would be.  He reports that in the last year, he continues to have left greater than right buttock and leg pain going all the way down into his feet.  Reports that he really does not have much back pain.  He rates his pain in general at a 5 out of 10.  His pain is worse with lifting and with walking quickly.  He has no pain with walking slowly.  Laying on his stomach will also aggravate his pain.  She does feel better with resting.  He denies any new symptoms.  He stopped taking the gabapentin because he says that even at 900 mg 3 times a day, taking this for 2 to 3 months he did not notice any relief so he discontinued the medication.  He is not really taking anything for pain at this time.  He did finish physical therapy in 2019 and states that he is doing his home exercise program on a regular basis.    Past medical history is reviewed and is unchanged for any new medical diagnoses in the interim.      Family history is reviewed and is unchanged in the interim.      Review of Systems:  Positive for leg cramps at night.  Pertinent negatives include no numbness, tingling, weakness, headaches, fevers, chills, unexplained weight loss, bowel incontinence, bladder incontinence, trips, stumbles, falls.  All others reviewed and are negative.     Objective:   CONSTITUTIONAL:  Vital signs as above.  No acute distress.  The patient is well nourished and well groomed.    PSYCHIATRIC:  The patient is awake, alert, oriented to person, place and time.  The patient is answering questions appropriately with clear speech.  Normal affect.  SKIN:  Skin over the face, posterior torso, bilateral upper and lower extremities is clean, dry, intact without rashes.  MUSCULOSKELETAL:  Gait is non-antalgic.  He does not have any difficulty transferring from the chair to the examination table.  He does not have any tenderness of the bilateral lumbar paraspinal muscles.     The patient has 5/5 strength for the bilateral hip flexors, knee  flexors/extensors, ankle dorsiflexors/plantar flexors, ankle evertors/invertors.  Negative seated straight leg raising test  NEUROLOGICAL: 2/4 patellar, medial hamstring, achilles reflexes which are symmetric bilaterally.  No ankle clonus bilaterally.  Sensation to light touch is intact in the bilateral L4, L5, and S1 dermatomes.

## 2022-05-04 ENCOUNTER — OFFICE VISIT (OUTPATIENT)
Dept: PHYSICAL MEDICINE AND REHAB | Facility: CLINIC | Age: 82
End: 2022-05-04
Payer: MEDICARE

## 2022-05-04 VITALS
HEART RATE: 77 BPM | OXYGEN SATURATION: 98 % | TEMPERATURE: 97.5 F | SYSTOLIC BLOOD PRESSURE: 164 MMHG | DIASTOLIC BLOOD PRESSURE: 77 MMHG

## 2022-05-04 DIAGNOSIS — M48.061 LUMBAR FORAMINAL STENOSIS: ICD-10-CM

## 2022-05-04 DIAGNOSIS — G89.29 CHRONIC BILATERAL LOW BACK PAIN WITH BILATERAL SCIATICA: Primary | ICD-10-CM

## 2022-05-04 DIAGNOSIS — M54.42 CHRONIC BILATERAL LOW BACK PAIN WITH BILATERAL SCIATICA: Primary | ICD-10-CM

## 2022-05-04 DIAGNOSIS — M54.16 RADICULOPATHY, LUMBAR REGION: ICD-10-CM

## 2022-05-04 DIAGNOSIS — M54.41 CHRONIC BILATERAL LOW BACK PAIN WITH BILATERAL SCIATICA: Primary | ICD-10-CM

## 2022-05-04 PROCEDURE — 99214 OFFICE O/P EST MOD 30 MIN: CPT | Performed by: PHYSICAL MEDICINE & REHABILITATION

## 2022-05-04 RX ORDER — PREGABALIN 100 MG/1
CAPSULE ORAL
Qty: 90 CAPSULE | Refills: 0 | Status: SHIPPED | OUTPATIENT
Start: 2022-05-04 | End: 2022-05-31 | Stop reason: SINTOL

## 2022-05-04 ASSESSMENT — PAIN SCALES - GENERAL: PAINLEVEL: MODERATE PAIN (5)

## 2022-05-04 NOTE — LETTER
5/4/2022         RE: Anthony Evangelista  [address] [facility] [address]        Dear Colleague,    Thank you for referring your patient, Anthony Evangelista, to the [facility]. Please see a copy of my visit note below.    Assessment:   Anthony Evangelista is a 81 year old y.o. male with past medical history significant for type 2 diabetes, benign prostatic hypertrophy, hyperlipidemia who presents today for follow-up regarding mild worsening of his chronic bilateral low back pain with left greater than right radicular/sciatica type leg symptoms.  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  His EMG showed possible chronic, inactive left S1 radiculopathy and a possible chronic inactive right L5 radiculopathy.  There is no electrodiagnostic evidence for large fiber peripheral neuropathy seen on his EMG.  He remains without any red flag symptoms.    Patient was last seen on May 25, 2021.    PSP:  Dr. Kathleen       Plan:     A shared decision making plan was used.  The patient's values and choices were respected.  The following represents what was discussed and decided upon by the physician and the patient.      1.  DIAGNOSTIC TESTS:    -His EMG report from May 12, 2021 was reviewed by the physician and is summarized as follows: There are prolonged motor unit potentials in the left gastroc muscle in combination with an abnormal left tibial H reflex.  All these findings are nonspecific and nondiagnostic.  Under the correct clinical condition, they can be observed in a chronic and inactive left S1 radiculopathy.  There is mild, low amplitude of the right peroneal motor study to the EDB, which is nonspecific.  This could represent a chronic and inactive right L5 radiculopathy versus a nonlocalizable peroneal neuropathy.  There is no active denervation along the peroneal innervated muscles in the  right lower extremity.  No evidence for peripheral neuropathy in the bilateral lower extremities.  -His MRI of the lumbar spine from April 23, 2021 was personally reviewed today by the physician of the physician performing her own interpretation.  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  There is central canal stenosis at the L3-4 and L4-5 levels.  There is moderate central canal stenosis and lateral recess stenosis at the L2-3 level, which may impinge on the L3 nerves.  There is significant right lateral recess stenosis at the L5-S1 level, which may impinge on the right S1 nerve.  There is significant bilateral L5-S1 foraminal stenosis with more moderate bilateral foraminal stenosis seen at the L2-3, L3-4, L4-5 levels.  I did show him the image and explained the findings  2.  PHYSICAL THERAPY: He completed physical therapy for the low back and leg pain in 2019.  He is encouraged to continue doing his home exercise program on a regular basis.  We can consider repeat course of physical therapy in the future if needed.  3.  MEDICATIONS: At the last visit, he was prescribed gabapentin 300 mg and asked to titrate up to a maximum of 900 mg 3 times a day.  At this time, discontinue the medication as he states that it was ineffective even when he was taking 900 mg 3 times a day, which she stated that he took for 2 to 3 months before discontinuing it.  -I did offer him Lyrica, and he would like to try this medication after discussing the possible side effects.  Lyrica 100 mg, twice a day for 7 days and then increasing to Lyrica 100 mg 3 times a day was prescribed.  A PDMP check was run today and he is deemed low risk and appropriate for Lyrica use  4.  INTERVENTIONS:    -We have previously deferred the bilateral L5-S1 transforaminal epidural steroid injections as his pain was largely manageable.  At this time, he still does not feel like his pain is bad enough to warrant the injections, but he would like to be  able to call to schedule he is if he gets to that point.  I did place an order for these that is good for the next 6 months and he can call to schedule these if his pain worsens within that time period.  I would be willing to place an order for an injection if he calls further than 6 months out, as long as his symptoms are verified to be the same.  -If he fails to have relief of the bilateral L5-S1 transforaminal epidural steroid injections, we could consider bilateral S1-2 transforaminal epidural steroid injections.  5.  PATIENT EDUCATION:    -I did encourage him to continue working with his primary care physician to lower his hemoglobin A1c.  -I did show him the foraminal stenosis that is likely the cause of his pain.  -I did discuss all of the treatment options with him.  I explained the potential risks of a cortisone injection.  -Explained that I think that he will likely be able to avoid surgery, and if needed, we will be able to manage his pain with injections without progressing to surgery.  -I explained that many patients have at least 75% relief for at least 3 to 6 months.  -All of his questions were answered to his satisfaction today.  He was in agreement the treatment plan.  6.  FOLLOW-UP: Patient is welcome to follow-up as needed.  Again he can call to schedule the bilateral L5-S1 transforaminal epidural steroid injections in the next 6 months if he feels that his pain has worsened to the point that he would like to move forward with the injections if there are any questions/concerns or any significant worsening of pain, the patient is asked to call the clinic via the nurse navigation line or via [facility].     Subjective:     Anthony Evangelista is a 81 year old male who presents today for follow-up regarding chronic bilateral low back pain with bilateral radicular/sciatic type leg symptoms.  The patient was last seen in May 2021.  He reports that since he was last seen, his pain has been slightly  worsening, but largely things remain manageable.  He says that his main reason in coming in today is that he wanted to discuss his injection options.  He states that several of his friends have had injections and he wanted to find out what the risks and potential benefits would be.  He reports that in the last year, he continues to have left greater than right buttock and leg pain going all the way down into his feet.  Reports that he really does not have much back pain.  He rates his pain in general at a 5 out of 10.  His pain is worse with lifting and with walking quickly.  He has no pain with walking slowly.  Laying on his stomach will also aggravate his pain.  She does feel better with resting.  He denies any new symptoms.  He stopped taking the gabapentin because he says that even at 900 mg 3 times a day, taking this for 2 to 3 months he did not notice any relief so he discontinued the medication.  He is not really taking anything for pain at this time.  He did finish physical therapy in 2019 and states that he is doing his home exercise program on a regular basis.    Past medical history is reviewed and is unchanged for any new medical diagnoses in the interim.      Family history is reviewed and is unchanged in the interim.      Review of Systems:  Positive for leg cramps at night.  Pertinent negatives include no numbness, tingling, weakness, headaches, fevers, chills, unexplained weight loss, bowel incontinence, bladder incontinence, trips, stumbles, falls.  All others reviewed and are negative.     Objective:   CONSTITUTIONAL:  Vital signs as above.  No acute distress.  The patient is well nourished and well groomed.    PSYCHIATRIC:  The patient is awake, alert, oriented to person, place and time.  The patient is answering questions appropriately with clear speech.  Normal affect.  SKIN:  Skin over the face, posterior torso, bilateral upper and lower extremities is clean, dry, intact without  rylee.  MUSCULOSKELETAL:  Gait is non-antalgic.  He does not have any difficulty transferring from the chair to the examination table.  He does not have any tenderness of the bilateral lumbar paraspinal muscles.     The patient has 5/5 strength for the bilateral hip flexors, knee flexors/extensors, ankle dorsiflexors/plantar flexors, ankle evertors/invertors.  Negative seated straight leg raising test  NEUROLOGICAL: 2/4 patellar, medial hamstring, achilles reflexes which are symmetric bilaterally.  No ankle clonus bilaterally.  Sensation to light touch is intact in the bilateral L4, L5, and S1 dermatomes.               Again, thank you for allowing me to participate in the care of your patient.        Sincerely,        Beatriz Villanueva, DO

## 2022-05-04 NOTE — PATIENT INSTRUCTIONS
Anthony    I am glad that your back pain remains manageable.  I think it is fine to hold off on doing an injection until your pain gets to the point where it significantly interferes with your functioning.  Once you feel like you are at the point where you need to have an injection, please call [phone] to schedule the epidural steroid injection.  I have placed an order, she will be able to schedule as long as you call within the next 6 months.  If you call outside of 6 months, please send me a [email or web address] message that I can put in a new order for the epidural steroid injections.    Please note that this injection uses cortisone.  The cortisone may somewhat weaken the immune system.  It is unknown how much the immune system is weakened.  It is unknown if it is weakened to the point that you may be more likely to get the COVID-19 virus, or if you do get the COVID-19 virus, if you would be sicker than you would have been if you had not had the cortisone injection.  If you do not wish to proceed with the injection, please let the nurse/physician know and do NOT schedule the injection.    Please note that since your immune system is weakened from the cortisone, having the FLU or COVID-19 vaccine/shot may be less effective if you have a vaccine within 2 weeks from your cortisone injection.  It is advised to wait 2 weeks after your cortisone injection to have the  FLU or COVID-19 vaccine/shot (or if you have the vaccine/shot first, wait 2 weeks before you have the cortisone injection).    Once used call to schedule the injection, you will be scheduled 1 week out to allow us time to get your insurance authorization.  On the day of your injection, you cannot be sick or taking antibiotics.  If you become sick and antibiotics are prescribed, please call the clinic so your injection can be rescheduled for once you have completed your antibiotics.  You will need to bring a  with you for your injection.   If you have any  questions or concerns prior to your injection, please do not hesitate to call the nurse navigation line at [phone] or contact me through [employer].    I did prescribe Lyrica 100 mg.  Please take 1 capsule twice a day for 1 week and then increase to 1 capsule 3 times a day.  If you have any side effects to this medication, please send me a [employer] message.    Thanks, Anthony!  Dr. Kathleen

## 2022-05-31 ENCOUNTER — RADIOLOGY INJECTION OFFICE VISIT (OUTPATIENT)
Dept: PHYSICAL MEDICINE AND REHAB | Facility: CLINIC | Age: 82
End: 2022-05-31
Attending: PHYSICAL MEDICINE & REHABILITATION
Payer: MEDICARE

## 2022-05-31 VITALS
RESPIRATION RATE: 18 BRPM | HEART RATE: 67 BPM | SYSTOLIC BLOOD PRESSURE: 138 MMHG | DIASTOLIC BLOOD PRESSURE: 72 MMHG | TEMPERATURE: 97.8 F | OXYGEN SATURATION: 98 %

## 2022-05-31 DIAGNOSIS — M54.16 RADICULOPATHY, LUMBAR REGION: ICD-10-CM

## 2022-05-31 DIAGNOSIS — E11.9 DIABETES MELLITUS, TYPE 2 (H): Primary | ICD-10-CM

## 2022-05-31 DIAGNOSIS — M54.42 CHRONIC BILATERAL LOW BACK PAIN WITH BILATERAL SCIATICA: ICD-10-CM

## 2022-05-31 DIAGNOSIS — G89.29 CHRONIC BILATERAL LOW BACK PAIN WITH BILATERAL SCIATICA: ICD-10-CM

## 2022-05-31 DIAGNOSIS — M54.41 CHRONIC BILATERAL LOW BACK PAIN WITH BILATERAL SCIATICA: ICD-10-CM

## 2022-05-31 DIAGNOSIS — M48.061 LUMBAR FORAMINAL STENOSIS: ICD-10-CM

## 2022-05-31 LAB — GLUCOSE SERPL-MCNC: 196 MG/DL (ref 70–99)

## 2022-05-31 PROCEDURE — 64483 NJX AA&/STRD TFRM EPI L/S 1: CPT | Mod: 50 | Performed by: PHYSICAL MEDICINE & REHABILITATION

## 2022-05-31 PROCEDURE — 82962 GLUCOSE BLOOD TEST: CPT | Performed by: PHYSICAL MEDICINE & REHABILITATION

## 2022-05-31 RX ORDER — METHYLPREDNISOLONE ACETATE 80 MG/ML
INJECTION, SUSPENSION INTRA-ARTICULAR; INTRALESIONAL; INTRAMUSCULAR; SOFT TISSUE
Status: COMPLETED | OUTPATIENT
Start: 2022-05-31 | End: 2022-05-31

## 2022-05-31 RX ORDER — LIDOCAINE HYDROCHLORIDE 10 MG/ML
INJECTION, SOLUTION EPIDURAL; INFILTRATION; INTRACAUDAL; PERINEURAL
Status: COMPLETED | OUTPATIENT
Start: 2022-05-31 | End: 2022-05-31

## 2022-05-31 RX ADMIN — METHYLPREDNISOLONE ACETATE 80 MG: 80 INJECTION, SUSPENSION INTRA-ARTICULAR; INTRALESIONAL; INTRAMUSCULAR; SOFT TISSUE at 08:31

## 2022-05-31 RX ADMIN — LIDOCAINE HYDROCHLORIDE 5 ML: 10 INJECTION, SOLUTION EPIDURAL; INFILTRATION; INTRACAUDAL; PERINEURAL at 08:31

## 2022-05-31 ASSESSMENT — PAIN SCALES - GENERAL
PAINLEVEL: MODERATE PAIN (5)
PAINLEVEL: MODERATE PAIN (5)

## 2022-05-31 NOTE — PATIENT INSTRUCTIONS
Follow-up visit with Dr. Kathleen in 2 weeks to discuss injection outcome and determine care plan going forward.    Please be advised that your blood glucose levels may be increased for the next 5-7 days as a result of the steroid you received with your injection today.  It is recommended that you monitor your blood glucose levels closely over the next week and direct any additional questions regarding your blood glucose management to your primary doctor.       DISCHARGE INSTRUCTIONS    During office hours (8:00 a.m.- 4:00 p.m.) questions or concerns may be answered  by calling [facility] Nurses at  [phone].  Messages received after hours will be returned the following business day.      In the case of an emergency, please dial 911 or seek assistance at the nearest Emergency Room/Urgent Care facility.     All Patients:    You may experience an increase in your symptoms for the first 2 days (It may take anywhere between 2 days- 2 weeks for the steroid to have maximum effect).    You may use ice on the injection site, as frequently as 20 minutes each hour if needed.    You may take your pain medicine.    You may continue taking your regular medication after your injection. If you have had a Medial Branch Block you may resume pain medication once your pain diary is completed.    You may shower. No swimming, tub bath or hot tub for 48 hours.  You may remove your bandaid/bandage as soon as you are home.    You may resume light activities, as tolerated.    Resume your usual diet as tolerated.    It is strongly advised that you do not drive for 1-3 hours post injection.    If you have had oral sedation:  Do not drive for 8 hours post injection.      If you have had IV sedation:  Do not drive for 24 hours post injection.  Do not operate hazardous machinery or make important personal/business decisions for 24 hours.      POSSIBLE STEROID SIDE EFFECTS (If steroid/cortisone was used for your procedure)    -If  you experience these symptoms, it should only last for a short period    Swelling of the legs              Skin redness (flushing)     Mouth (oral) irritation   Blood sugar (glucose) levels            Sweats                    Mood changes  Headache  Sleeplessness  Weakened immune system for up to 14 days, which could increase the risk of gilles the COVID-19 virus and/or experiencing more severe symptoms of the disease, if exposed.  Decreased effectiveness of the flu vaccine if given within 2 weeks of the steroid.         POSSIBLE PROCEDURE SIDE EFFECTS  -Call the Spine Center if you are concerned  Increased Pain           Increased numbness/tingling      Nausea/Vomiting          Bruising/bleeding at site      Redness or swelling                                              Difficulty walking      Weakness           Fever greater than 100.5    *In the event of a severe headache after an epidural steroid injection that is relieved by lying down, please call the [facility] to speak with a clinical staff member*

## 2022-06-17 ENCOUNTER — OFFICE VISIT (OUTPATIENT)
Dept: PHYSICAL MEDICINE AND REHAB | Facility: CLINIC | Age: 82
End: 2022-06-17
Payer: MEDICARE

## 2022-06-17 VITALS
DIASTOLIC BLOOD PRESSURE: 67 MMHG | SYSTOLIC BLOOD PRESSURE: 150 MMHG | HEART RATE: 72 BPM | TEMPERATURE: 97.7 F | OXYGEN SATURATION: 98 %

## 2022-06-17 DIAGNOSIS — G89.29 CHRONIC BILATERAL LOW BACK PAIN WITH BILATERAL SCIATICA: Primary | ICD-10-CM

## 2022-06-17 DIAGNOSIS — M54.41 CHRONIC BILATERAL LOW BACK PAIN WITH BILATERAL SCIATICA: Primary | ICD-10-CM

## 2022-06-17 DIAGNOSIS — M54.42 CHRONIC BILATERAL LOW BACK PAIN WITH BILATERAL SCIATICA: Primary | ICD-10-CM

## 2022-06-17 DIAGNOSIS — M48.061 LUMBAR FORAMINAL STENOSIS: ICD-10-CM

## 2022-06-17 PROCEDURE — 99213 OFFICE O/P EST LOW 20 MIN: CPT | Performed by: PHYSICAL MEDICINE & REHABILITATION

## 2022-06-17 ASSESSMENT — PAIN SCALES - GENERAL: PAINLEVEL: MILD PAIN (2)

## 2022-06-17 NOTE — PATIENT INSTRUCTIONS
Anthony,    I am so glad that you are feeling better after your injection.  I am hoping that you will continue to have relief for quite some time.  We just need to see how long the relief will last for.  Some people last for 3 to 6 months, other people get more than a year of relief.  Everyone is different, so we will have to just wait and see how you respond.  We can repeat the injection once every 3 months if needed, but I am hoping that you will be able to go several months in between injections.    I think it is fine for you to wear a back brace when you are golfing or when you do heavier activities.  However you do not want to wear the back brace all the time, and I know that you have been very good with just wearing it when you golf or when you do heavier activities.    I do not think that you need to be careful about the way that you sit.  You just need to be aware if 1 side of your body becomes tighter than the other because of how you are sitting.  If you do notice tightness in 1 side more compared to the other, they just want to stretch the tighter side for twice as long as the side that is not as tight.    I think it is fine for you to sleep on your back or your stomach.  If you do end up sleeping on your side, it is best to put a pillow in between your knees so that your spine does not rotate while you are sleeping.    Please continue to do your physical therapy exercises on a regular basis.  I know that you have been very active with golf and walking.  Please continue to stay as active as possible.    You may benefit from wearing over-the-counter compression stockings.  You can buy these online or oftentimes at [employer].  The ones that provide 15 to 20 mmHg pressure are typically pretty good and are not too difficult to put on.    If you would ever like to repeat the EMG, please let me know and I be happy to order this, but it was reassuring that you are 1 from May 2021 did not show any peripheral  deana Cruz, at this point I am happy to see you back at any point in the future if needed.  If your pain returns, worsens, or you have any new symptoms, please make an appointment to see me.  You can call the scheduling number at [phone].  You are also welcome to send me any questions through [employer].    Thank you and have a wonderful summer!  Dr. Kathleen

## 2022-06-17 NOTE — LETTER
6/17/2022         RE: Anthony Evangelista  [address] [facility] [address]        Dear Colleague,    Thank you for referring your patient, Anthony Evangelista, to the [facility]. Please see a copy of my visit note below.    Assessment:   Anthony Evangelista is a 81 year old y.o. male with past medical history significant for type 2 diabetes, benign prostatic hypertrophy, hyperlipidemia who presents today for follow-up regarding chronic bilateral low back pain with left greater than right radicular/sciatica type leg symptoms.  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  His EMG showed possible chronic, inactive left S1 radiculopathy and a possible chronic inactive right L5 radiculopathy.  There is no electrodiagnostic evidence for large fiber peripheral neuropathy seen on his EMG.  He is most recently status post bilateral L5-S1 transforaminal epidural steroid injections on May 31, 2022.  At this time, he was reporting at least 50% relief from the injections.  Reports that he can walk farther without pain and he has less sharp pain with sitting.  He is neurologically intact and without any red flag symptoms    PSP:  Dr. Kathleen       Plan:     A shared decision making plan was used.  The patient's values and choices were respected.  The following represents what was discussed and decided upon by the physician and the patient.      1.  DIAGNOSTIC TESTS:    -His MRI of the lumbar spine from April 23, 2021 was personally reviewed today by the physician with physician performing her own interpretation.  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  There is central canal stenosis at the L3-4 and L4-5 levels.  There is moderate central canal stenosis and lateral recess stenosis at the L2-3 level, which may impinge on the L3 nerves.  There is significant right lateral recess stenosis at the  L5-S1 level which may impinge on the right S1 nerve.  There is significant bilateral L5-S1 foraminal stenosis with more moderate bilateral foraminal stenosis seen at the L2-3, L3-4, 4 5 levels.  2.  PHYSICAL THERAPY: I did offer him a repeat course of physical therapy.  He declined stating that he is already doing the exercises regularly on his own.  -He last did physical therapy for the low back and leg pain in 2019.  He is encouraged to continue doing his home exercise program on a regular basis  3.  MEDICATIONS: At the last visit, Lyrica 100 mg 3 times a day was prescribed.  At this time, he is not taking this given that he feels better after the injections.  -He had tried and failed gabapentin 900 mg 3 times a day secondary to ineffectiveness.  4.  INTERVENTIONS:    -I did discuss that he would be a candidate for bilateral S1-2 transforaminal epidural steroid injections to see if this would help with some of the calf and plantar foot pain, but given that his symptoms are significantly improved at this time I would not recommend performing this injection at this time.  -He is status post bilateral L5-S1 transforaminal epidural steroid injections on May 31, 2022.  He reports that he has had at least 50% relief with these injections.  5.  PATIENT EDUCATION:    -He had several questions today.  He asked if he should be wearing a back brace.  I explained that he can wear back brace with golfing or with heavier activities but he should not wear it all the time.  -He asked if he should use caution with how he sits.  I explained that he does not need to be overly cautious with how he sits, but he should try to be aware if 1 side of his body becomes tighter than the other based on how he is sitting.  He should try to stretch the tighter side for twice as long as the side that is not as tight.  Yes if he should be sleeping in any certain position.  I explained that sleeping on his back or sleeping on his stomach is just  fine.  If he does sleep on his side, would recommend that he try to put a pillow between his knees to prevent lumbar rotation.  -He asked if there are any sort of exercises that he should be doing.  I encouraged him to continue doing his physical therapy exercises on a regular basis which she has been diligent in doing.  I encouraged him to continue golfing and to continue walking and being as active as possible.  -I did mention that for the numbness in his feet, he may find that wearing compression stockings is helpful.  They can be purchased online.  I did recommend 15 to 20 mg of mercury for the compression.  -All of his questions were answered to his satisfaction today.  He was in agreement the treatment plan.  6.  FOLLOW-UP: The patient is welcome to follow-up as needed at this time if there are any questions/concerns or any significant worsening of pain the patient is asked to call the clinic via the nurse navigation line or via [place].     Subjective:     Anthony Evangelista is a 81 year old male who presents today for follow-up regarding chronic bilateral low back pain with bilateral radicular/sciatic type leg symptoms.  He is status post bilateral L5-S1 transforaminal epidural steroid injections on May 31, 2022.  He reports that he has had significant relief, reporting about 50% relief following these injections.  He says is hard to quantify the exact amount of relief, but he does note that he can walk better and he can walk farther without pain.  He  To have sharp pain when he sits down.  He does have some pain going across the low back.  He then gets some pain in the calf so he is cramping at night.  He then gets a numbness in the bottom of his feet but is not overly bothersome.  He rates his discomfort today at a 2 out of 10.  At worst it is a 5 out of 10.  At best is a 0 out of 10.  His pain seems to be worse with walking.  He does feel better with rest.  But again overall he feels significantly better  with the injection.  He denies any new symptoms at this time.  He is doing his physical therapy program on a regular basis.  He is not taking any medications and did not start the Lyrica.  He has several questions regarding exercise, back brace, sleeping    Past medical history is reviewed and is unchanged for any new medical diagnoses in the interim.      Family history is reviewed and is unchanged in the interim.      Review of Systems:  Numbness in the plantar surface of his feet..  Pertinent negatives include no weakness, headaches fevers, chills, unexplained weight loss, bowel incontinence, bladder incontinence, trips, stumbles, falls.  All others reviewed and are negative.     Objective:   CONSTITUTIONAL:  Vital signs as above.  No acute distress.  The patient is well nourished and well groomed.    PSYCHIATRIC:  The patient is awake, alert, oriented to person, place and time.  The patient is answering questions appropriately with clear speech.  Normal affect.  SKIN:  Skin over the face, posterior torso, bilateral upper and lower extremities is clean, dry, intact without rashes.  MUSCULOSKELETAL:  Gait is non-antalgic.  The patient is able to heel and toe walk without any difficulty.  No significant tenderness over the bilateral lumbar paraspinal muscles.      The patient has 5/5 strength for the bilateral hip flexors, knee flexors/extensors, ankle dorsiflexors/plantar flexors, ankle evertors/invertors.    NEUROLOGICAL: 2/4 patellar, medial hamstring, achilles reflexes which are symmetric bilaterally.  No ankle clonus bilaterally.  Sensation to light touch is intact in the bilateral L4, L5, and S1 dermatomes.               Again, thank you for allowing me to participate in the care of your patient.        Sincerely,        Beatriz Villanueva, DO

## 2022-06-17 NOTE — PROGRESS NOTES
Assessment:   Anthony Evangelista is a 81 year old y.o. male with past medical history significant for type 2 diabetes, benign prostatic hypertrophy, hyperlipidemia who presents today for follow-up regarding chronic bilateral low back pain with left greater than right radicular/sciatica type leg symptoms.  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  His EMG showed possible chronic, inactive left S1 radiculopathy and a possible chronic inactive right L5 radiculopathy.  There is no electrodiagnostic evidence for large fiber peripheral neuropathy seen on his EMG.  He is most recently status post bilateral L5-S1 transforaminal epidural steroid injections on May 31, 2022.  At this time, he was reporting at least 50% relief from the injections.  Reports that he can walk farther without pain and he has less sharp pain with sitting.  He is neurologically intact and without any red flag symptoms    PSP:  Dr. Kathleen       Plan:     A shared decision making plan was used.  The patient's values and choices were respected.  The following represents what was discussed and decided upon by the physician and the patient.      1.  DIAGNOSTIC TESTS:    -His MRI of the lumbar spine from April 23, 2021 was personally reviewed today by the physician with physician performing her own interpretation.  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  There is central canal stenosis at the L3-4 and L4-5 levels.  There is moderate central canal stenosis and lateral recess stenosis at the L2-3 level, which may impinge on the L3 nerves.  There is significant right lateral recess stenosis at the L5-S1 level which may impinge on the right S1 nerve.  There is significant bilateral L5-S1 foraminal stenosis with more moderate bilateral foraminal stenosis seen at the L2-3, L3-4, 4 5 levels.  2.  PHYSICAL THERAPY: I did offer him a repeat course of physical  therapy.  He declined stating that he is already doing the exercises regularly on his own.  -He last did physical therapy for the low back and leg pain in 2019.  He is encouraged to continue doing his home exercise program on a regular basis  3.  MEDICATIONS: At the last visit, Lyrica 100 mg 3 times a day was prescribed.  At this time, he is not taking this given that he feels better after the injections.  -He had tried and failed gabapentin 900 mg 3 times a day secondary to ineffectiveness.  4.  INTERVENTIONS:    -I did discuss that he would be a candidate for bilateral S1-2 transforaminal epidural steroid injections to see if this would help with some of the calf and plantar foot pain, but given that his symptoms are significantly improved at this time I would not recommend performing this injection at this time.  -He is status post bilateral L5-S1 transforaminal epidural steroid injections on May 31, 2022.  He reports that he has had at least 50% relief with these injections.  5.  PATIENT EDUCATION:    -He had several questions today.  He asked if he should be wearing a back brace.  I explained that he can wear back brace with golfing or with heavier activities but he should not wear it all the time.  -He asked if he should use caution with how he sits.  I explained that he does not need to be overly cautious with how he sits, but he should try to be aware if 1 side of his body becomes tighter than the other based on how he is sitting.  He should try to stretch the tighter side for twice as long as the side that is not as tight.  Yes if he should be sleeping in any certain position.  I explained that sleeping on his back or sleeping on his stomach is just fine.  If he does sleep on his side, would recommend that he try to put a pillow between his knees to prevent lumbar rotation.  -He asked if there are any sort of exercises that he should be doing.  I encouraged him to continue doing his physical therapy  exercises on a regular basis which she has been diligent in doing.  I encouraged him to continue golfing and to continue walking and being as active as possible.  -I did mention that for the numbness in his feet, he may find that wearing compression stockings is helpful.  They can be purchased online.  I did recommend 15 to 20 mg of mercury for the compression.  -All of his questions were answered to his satisfaction today.  He was in agreement the treatment plan.  6.  FOLLOW-UP: The patient is welcome to follow-up as needed at this time if there are any questions/concerns or any significant worsening of pain the patient is asked to call the clinic via the nurse navigation line or via [place].     Subjective:     Anthony Evangelista is a 81 year old male who presents today for follow-up regarding chronic bilateral low back pain with bilateral radicular/sciatic type leg symptoms.  He is status post bilateral L5-S1 transforaminal epidural steroid injections on May 31, 2022.  He reports that he has had significant relief, reporting about 50% relief following these injections.  He says is hard to quantify the exact amount of relief, but he does note that he can walk better and he can walk farther without pain.  He  To have sharp pain when he sits down.  He does have some pain going across the low back.  He then gets some pain in the calf so he is cramping at night.  He then gets a numbness in the bottom of his feet but is not overly bothersome.  He rates his discomfort today at a 2 out of 10.  At worst it is a 5 out of 10.  At best is a 0 out of 10.  His pain seems to be worse with walking.  He does feel better with rest.  But again overall he feels significantly better with the injection.  He denies any new symptoms at this time.  He is doing his physical therapy program on a regular basis.  He is not taking any medications and did not start the Lyrica.  He has several questions regarding exercise, back brace,  sleeping    Past medical history is reviewed and is unchanged for any new medical diagnoses in the interim.      Family history is reviewed and is unchanged in the interim.      Review of Systems:  Numbness in the plantar surface of his feet..  Pertinent negatives include no weakness, headaches fevers, chills, unexplained weight loss, bowel incontinence, bladder incontinence, trips, stumbles, falls.  All others reviewed and are negative.     Objective:   CONSTITUTIONAL:  Vital signs as above.  No acute distress.  The patient is well nourished and well groomed.    PSYCHIATRIC:  The patient is awake, alert, oriented to person, place and time.  The patient is answering questions appropriately with clear speech.  Normal affect.  SKIN:  Skin over the face, posterior torso, bilateral upper and lower extremities is clean, dry, intact without rashes.  MUSCULOSKELETAL:  Gait is non-antalgic.  The patient is able to heel and toe walk without any difficulty.  No significant tenderness over the bilateral lumbar paraspinal muscles.      The patient has 5/5 strength for the bilateral hip flexors, knee flexors/extensors, ankle dorsiflexors/plantar flexors, ankle evertors/invertors.    NEUROLOGICAL: 2/4 patellar, medial hamstring, achilles reflexes which are symmetric bilaterally.  No ankle clonus bilaterally.  Sensation to light touch is intact in the bilateral L4, L5, and S1 dermatomes.

## 2022-07-12 ENCOUNTER — NURSE TRIAGE (OUTPATIENT)
Dept: NURSING | Facility: CLINIC | Age: 82
End: 2022-07-12

## 2022-07-12 NOTE — TELEPHONE ENCOUNTER
LM for pt to call back- please assist in scheduling telephone visit with Dr Higuera (see message below)

## 2022-07-12 NOTE — TELEPHONE ENCOUNTER
Likely this represents tinnitus a benign condition of the inner ear and there is not much treatment for it.  The patient should schedule a phone visit with me to review and discuss.

## 2022-07-12 NOTE — TELEPHONE ENCOUNTER
"Patient is calling and is starting to get a little \"buzz\" in his ears for the last couple of days.  Denies ear pain and no ringing.  Buzzing is in left ear only.  Denies injury to his ear.  Denies taking high doses of aspirin.  Denies sudden hearing loss.  Denies ear pain.  Patient denies loud noises.  Patient states that he is not taking gentamycin or furosemide.  Patient states that it just comes and goes and last a few seconds to minutes.  Patient is requesting a message be sent to Merlin Barrera as patient is wanting to know if he should be seen in clinic or go to an ear MD and if so whom.  Patient is requesting to speak with Merlin Barrera.  Clinic please phone patient.  Patient states that it is alright to leave a detailed message if no answer.    COVID 19 Nurse Triage Plan/Patient Instructions    Please be aware that novel coronavirus (COVID-19) may be circulating in the community. If you develop symptoms such as fever, cough, or SOB or if you have concerns about the presence of another infection including coronavirus (COVID-19), please contact your health care provider or visit [email or web address].     Disposition/Instructions    In-Person Visit with provider recommended. Reference Visit Selection Guide.    Thank you for taking steps to prevent the spread of this virus.  o Limit your contact with others.  o Wear a simple mask to cover your cough.  o Wash your hands well and often.    Resources    [facility]: About COVID-19: [email or web address]    CDC: What to Do If You're Sick: www.cdc.gov/coronavirus/2019-ncov/about/steps-when-sick.html    CDC: Ending Home Isolation: www.cdc.gov/coronavirus/2019-ncov/hcp/disposition-in-home-patients.html     CDC: Caring for Someone: www.cdc.gov/coronavirus/2019-ncov/if-you-are-sick/care-for-someone.html     [facility]: Interim Guidance for Hospital Discharge to Home: " www.health.[email or web address].[email or web address]/diseases/coronavirus/hcp/hospdischarge.pdf    [facility] clinical trials (COVID-19 research studies): [email or web address]-[email or web address]     Below are the COVID-19 hotlines at the [place] Department of [facility] ([facility]). Interpreters are available.   o For health questions: Call 232-104-7020 or 1-434.874.5428 (7 a.m. to 7 p.m.)  o For questions about schools and childcare: Call 892-554-8629 or 1-590.742.7526 (7 a.m. to 7 p.m.)                       Reason for Disposition    Patient sounds very sick or weak to the triager    MODERATE-SEVERE tinnitus (i.e., interferes with work, school, or sleep)    Additional Information    Negative: Taking aspirin and dosage sounds high (i.e., > 1500 mg/day)    Negative: Hearing loss in one or both ears and sudden onset and present now    Negative: Ear is painful    Negative: Decreased hearing followed sudden, extremely loud noise (e.g., explosion, not just loud concert)    Negative: Taking medication that can damage hearing (i.e., gentamycin, tobramycin, furosemide, ethacrynic acid, cisplatin, quinidine)    Protocols used: TINNITUS-A-OH

## 2022-09-08 ENCOUNTER — TRANSFERRED RECORDS (OUTPATIENT)
Dept: HEALTH INFORMATION MANAGEMENT | Facility: CLINIC | Age: 82
End: 2022-09-08

## 2022-09-25 ENCOUNTER — HEALTH MAINTENANCE LETTER (OUTPATIENT)
Age: 82
End: 2022-09-25

## 2022-10-06 ENCOUNTER — NURSE TRIAGE (OUTPATIENT)
Dept: NURSING | Facility: CLINIC | Age: 82
End: 2022-10-06

## 2022-10-06 NOTE — TELEPHONE ENCOUNTER
"Patient is calling and tested positive for covid on Tuesday October 4th.  Symptoms start October 2nd.  Today has a cough only.  Declines triage.      Coronavirus (COVID-19) Notification    Caller Name (Patient, parent, daughter/son, grandparent, etc)  Anthony Evangelista    Reason for call  Notify of Positive Coronavirus (COVID-19) lab results, assess symptoms,  review  [employer] recommendations    Lab Result    Lab test:  2019-nCoV rRt-PCR or SARS-CoV-2 PCR    Oropharyngeal AND/OR nasopharyngeal swabs is POSITIVE for 2019-nCoV RNA/SARS-COV-2 PCR (COVID-19 virus)    Brief introduction script  Introduce self then review script:  \"I am calling on behalf of [employer].  We were notified that your Coronavirus test (COVID-19) for was POSITIVE for the virus.\"    Gather patient reported symptoms   Assessment   Current Symptoms at time of phone call, reported by patient: (if no symptoms, document No symptoms] Cough   Date of Symptom(s) onset (if applicable) October 2, 2022     If at time of call, Patients symptoms hare worsened, the Patient should contact 911 or have someone drive them to Emergency Dept promptly:      If Patient calling 911, inform 911 personal that you have tested positive for the Coronavirus (COVID-19).  Place mask on and await 911 to arrive.    If Emergency Dept, If possible, please have another adult drive you to the Emergency Dept but you need to wear mask when in contact with other people.      Monoclonal Antibody Administration    You may be eligible to receive a new treatment with a monoclonal antibody for preventing hospitalization in patients at high risk for complications from COVID-19. This medication is still experimental and available on a limited basis; it is given through an IV and must be given at an infusion center. Please note that not all people who are eligible will receive the medication since it is in limited supply.  Is the patient symptomatic and onset of symptoms " within the last 7 days?  Yes  Is the patient interested in a visit with a provider to discuss treatment options?: Yes  Is the patient seen at [facility]?  No: Warm transfer caller to [phone] to be scheduled with a virtual urgent provider.  During transfer, instruct  on appropriate time frame for visit     Review information with Patient    Your result was positive. This means you have COVID-19 (coronavirus).      How can I protect others?    These guidelines are for isolating before returning to work, school or .       If you DO have symptoms:  o Stay home and away from others  - For at least 5 days after your symptoms started, AND   - You are fever free for 24 hours (with no medicine that reduces fever), AND  - Your other symptoms are better.  o Wear a mask for 10 full days any time you are around others.    If you DON'T have symptoms:  o Stay at home and away from others for at least 5 days after your positive test.  o Wear a mask for 10 full days any time you are around others.    There may be different guidelines for healthcare facilities. Please check with the specific sites before arriving.     If you've been told by a doctor that you were severely ill with COVID-19 or are immunocompromised, you should isolate for at least 10 days.    You should not go back to work until you meet the guidelines above for ending your home isolation. You don't need to be retested for COVID-19 before going back to work--studies show that you won't spread the virus if it's been at least 10 days since your symptoms started (or 20 days, if you have a weak immune system).    Employers, schools, and daycares: This is an official notice for this person's medical guidelines for returning in-person. They must meet the above guidelines before going back to work, school, or  in person.    You will receive a positive COVID-19 letter via [employer] or the mail soon with additional self-care information.       Would you like me to review some of that information with you now?  Yes    How can I take care of myself?      Get lots of rest. Drink extra fluids (unless a doctor has told you not to).      Take Tylenol (acetaminophen) for fever or pain. If you have liver or kidney problems, ask your family doctor if it's okay to take Tylenol.     Take either:     650 mg (two 325 mg pills) every 4 to 6 hours, or     1,000 mg (two 500 mg pills) every 8 hours as needed.     Note: Do not take more than 3,000 mg in one day. Acetaminophen is found in many medicines (both prescribed and over-the-counter medicines). Read all labels to be sure you don't take too much.    For children, check the Tylenol bottle for the right dose (based on their age or weight).      If you have other health problems (like cancer, heart failure, an organ transplant or severe kidney disease): Call your specialty clinic if you don't feel better in the next 2 days.      Know when to call 911: Emergency warning signs include:    Trouble breathing or shortness of breath    Pain or pressure in the chest that doesn't go away    Feeling confused like you haven't felt before, or not being able to wake up    Bluish-colored lips or face        If you were tested for an upcoming procedure, please contact your provider for next steps.     Ruba Caballero RN    Reason for Disposition    Information only question and nurse able to answer    Additional Information    Negative: Nursing judgment    Negative: Nursing judgment    Negative: Nursing judgment    Negative: Nursing judgment    Protocols used: INFORMATION ONLY CALL - NO TRIAGE-A-OH

## 2022-10-26 ENCOUNTER — IMMUNIZATION (OUTPATIENT)
Dept: PEDIATRICS | Facility: CLINIC | Age: 82
End: 2022-10-26
Payer: MEDICARE

## 2022-10-26 DIAGNOSIS — Z23 NEED FOR PROPHYLACTIC VACCINATION AND INOCULATION AGAINST INFLUENZA: Primary | ICD-10-CM

## 2022-10-26 PROCEDURE — 99207 PR NO CHARGE NURSE ONLY: CPT

## 2022-10-26 PROCEDURE — 90662 IIV NO PRSV INCREASED AG IM: CPT

## 2022-10-26 PROCEDURE — G0008 ADMIN INFLUENZA VIRUS VAC: HCPCS

## 2022-11-18 DIAGNOSIS — E11.8 TYPE 2 DIABETES MELLITUS WITH COMPLICATION, WITHOUT LONG-TERM CURRENT USE OF INSULIN (H): ICD-10-CM

## 2022-11-19 NOTE — TELEPHONE ENCOUNTER
"Routing refill request to provider for review/approval because:  Labs not current:  a1c    Last Written Prescription Date:  11/26/21  Last Fill Quantity: 180,  # refills: 3   Last office visit provider:  3/29/22     Requested Prescriptions   Pending Prescriptions Disp Refills     metFORMIN (GLUCOPHAGE) 500 MG tablet [Pharmacy Med Name: METFORMIN 500MG TABLETS] 180 tablet 3     Sig: TAKE 1 TABLET(500 MG) BY MOUTH TWICE DAILY WITH MEALS       Biguanide Agents Failed - 11/18/2022  3:54 AM        Failed - Patient has documented A1c within the specified period of time.     If HgbA1C is 8 or greater, it needs to be on file within the past 3 months.  If less than 8, must be on file within the past 6 months.     Recent Labs   Lab Test 03/29/22  0725   A1C 7.9*             Failed - Recent (6 mo) or future (30 days) visit within the authorizing provider's specialty     Patient had office visit in the last 6 months or has a visit in the next 30 days with authorizing provider or within the authorizing provider's specialty.  See \"Patient Info\" tab in inbasket, or \"Choose Columns\" in Meds & Orders section of the refill encounter.            Passed - Patient is age 10 or older        Passed - Patient's CR is NOT>1.4 OR Patient's EGFR is NOT<45 within past 12 mos.     Recent Labs   Lab Test 03/29/22  0725 03/26/21  0813   GFRESTIMATED 58* 53*   GFRESTBLACK  --  >60       Recent Labs   Lab Test 03/29/22  0725   CR 1.26             Passed - Patient does NOT have a diagnosis of CHF.        Passed - Medication is active on med list             Olivia Meza RN 11/18/22 8:06 PM  "

## 2022-12-14 ENCOUNTER — NURSE TRIAGE (OUTPATIENT)
Dept: NURSING | Facility: CLINIC | Age: 82
End: 2022-12-14

## 2022-12-14 NOTE — TELEPHONE ENCOUNTER
Patient calls with concerns regarding a buzzing in his left ear that has been present for 5-6 days. The buzzing comes frequently, but does not last very long. He denies any injury to the ear, denies pain or hearing concerns. Patient does say that occasionally he gets slightly dizzy when it comes.the buzzing does not interfere with activities, it does not keep him from sleeping.    See in office within 3 days per protocol. Patient verbalizes understanding and denies further questions. He is transferred to [facility] for further assistance.    Gabrielle Pereyra RN  [facility]           Reason for Disposition    Symptoms only or mainly in 1 ear (unilateral tinnitus)    Recurrent episodes of hearing loss, dizziness, and ringing in the ear    Additional Information    Negative: Followed an ear injury    Negative: Hearing loss is main symptom    Negative: Patient sounds very sick or weak to the triager    Negative: Taking aspirin and dosage sounds high (i.e., > 1500 mg/day)    Negative: Hearing loss in one or both ears and sudden onset and present now    Negative: Ear is painful    Negative: Decreased hearing followed sudden, extremely loud noise (e.g., explosion, not just loud concert)    Negative: Taking medication that can damage hearing (i.e., gentamycin, tobramycin, furosemide, ethacrynic acid, cisplatin, quinidine)    Negative: MODERATE-SEVERE tinnitus (i.e., interferes with work, school, or sleep)    Protocols used: TINNITUS-A-OH

## 2023-01-12 DIAGNOSIS — E11.69 TYPE 2 DIABETES MELLITUS WITH OTHER SPECIFIED COMPLICATION, WITHOUT LONG-TERM CURRENT USE OF INSULIN (H): ICD-10-CM

## 2023-01-13 ENCOUNTER — MEDICAL CORRESPONDENCE (OUTPATIENT)
Dept: HEALTH INFORMATION MANAGEMENT | Facility: CLINIC | Age: 83
End: 2023-01-13

## 2023-01-13 NOTE — TELEPHONE ENCOUNTER
"Routing refill request to provider for review/approval because:  Patient needs to be seen because:  q6m    Last Written Prescription Date:  2020  Last Fill Quantity: 100,  # refills: 11   Last office visit provider:  3/29/22     Requested Prescriptions   Pending Prescriptions Disp Refills     ACCU-CHEK COMPACT PLUS test strip 100 strip 11     Si strip 2 times daily       Diabetic Supplies Protocol Failed - 2023 10:59 AM        Failed - Recent (6 mo) or future (30 days) visit within the authorizing provider's specialty     Patient had office visit in the last 6 months or has a visit in the next 30 days with authorizing provider.  See \"Patient Info\" tab in inbasket, or \"Choose Columns\" in Meds & Orders section of the refill encounter.            Passed - Medication is active on med list        Passed - Patient is 18 years of age or older             Olivia Meza RN 23 1:05 PM  "

## 2023-01-17 ENCOUNTER — TELEPHONE (OUTPATIENT)
Dept: INTERNAL MEDICINE | Facility: CLINIC | Age: 83
End: 2023-01-17
Payer: MEDICARE

## 2023-01-17 NOTE — TELEPHONE ENCOUNTER
FAX [facility] Pharmacy Message to Prescriber    ACCU-CHEK COMPACT PLUS (DRUM) #102    Message:  This was written as KARYNA.  This has been disconnected by .  Does the patient have the compact meter or can we get a new RX for a new meter and supplies.  Thanks

## 2023-01-17 NOTE — LETTER
January 17, 2023      Anthony Evangelista  [address], [place] [place] [facility] [address]        Dear Jean Carlos,    We are writing to inform you of your test results.    {results letter list:108328}    No results found from the In Basket message.    If you have any questions or concerns, please call the clinic at the number listed above.       Sincerely,

## 2023-01-20 ENCOUNTER — TELEPHONE (OUTPATIENT)
Dept: INTERNAL MEDICINE | Facility: CLINIC | Age: 83
End: 2023-01-20
Payer: MEDICARE

## 2023-01-20 NOTE — TELEPHONE ENCOUNTER
Forms from pharmacy regarding diabetes supplies was sent to clinic. Will place on PCP's desk for signature when he returns.       Justin Mancini Jr., CMA on 1/20/2023 at 12:43 PM

## 2023-01-23 ENCOUNTER — NURSE TRIAGE (OUTPATIENT)
Dept: NURSING | Facility: CLINIC | Age: 83
End: 2023-01-23
Payer: MEDICARE

## 2023-01-23 NOTE — TELEPHONE ENCOUNTER
Patient is calling back regarding his test strips and is requesting to speak with Melissa at [facility].      Reason for Disposition    Information only question and nurse able to answer    Additional Information    Negative: Nursing judgment    Negative: Nursing judgment    Negative: Nursing judgment    Negative: Nursing judgment    Protocols used: INFORMATION ONLY CALL - NO TRIAGE-A-OH

## 2023-01-30 ENCOUNTER — HEALTH MAINTENANCE LETTER (OUTPATIENT)
Age: 83
End: 2023-01-30

## 2023-01-31 ENCOUNTER — TELEPHONE (OUTPATIENT)
Dept: INTERNAL MEDICINE | Facility: CLINIC | Age: 83
End: 2023-01-31
Payer: MEDICARE

## 2023-01-31 DIAGNOSIS — M79.673 PAIN OF FOOT, UNSPECIFIED LATERALITY: Primary | ICD-10-CM

## 2023-01-31 NOTE — TELEPHONE ENCOUNTER
Order/Referral Request    Who is requesting: Patient    Orders being requested: Podiatry     Reason service is needed/diagnosis: foot pain    When are orders needed by: at earliest convenience.  Would like to know who Dr. Higuera has any recommendations.     Has this been discussed with Provider: No   Patient is scheduled for AWV 03/02/2023 but would like to get referral if possible before next schedule.     Does patient have a preference on a Group/Provider/Facility? [facility]    Does patient have an appointment scheduled?: No    Where to send orders: Epic    Could we send this information to you in [employer] or would you prefer to receive a phone call?:   Patient would like to be contacted via [employer]

## 2023-02-17 ENCOUNTER — OFFICE VISIT (OUTPATIENT)
Dept: PODIATRY | Facility: CLINIC | Age: 83
End: 2023-02-17
Payer: MEDICARE

## 2023-02-17 VITALS
HEIGHT: 66 IN | DIASTOLIC BLOOD PRESSURE: 82 MMHG | SYSTOLIC BLOOD PRESSURE: 124 MMHG | WEIGHT: 154 LBS | BODY MASS INDEX: 24.75 KG/M2

## 2023-02-17 DIAGNOSIS — E11.42 TYPE 2 DIABETES MELLITUS WITH PERIPHERAL NEUROPATHY (H): Primary | ICD-10-CM

## 2023-02-17 PROCEDURE — 99203 OFFICE O/P NEW LOW 30 MIN: CPT | Performed by: PODIATRIST

## 2023-02-17 NOTE — PROGRESS NOTES
"Foot & Ankle Surgery  February 17, 2023    CC: \"checking my feet\"    I was asked to see Anthony Evangelista regarding the chief complaint by:  Dr. ELPIDIO Higuera    HPI:  Pt is a 82 year old male who presents with above complaint.  Multiple month history of bilateral lower extremity issues.  He describes \"tingling\", \"like walking on like sandpaper\".  Pain 1 out of 10 \"like pain when walking\".  \"Nothing\" for treatment.  He does have sciatic nerve issues and the symptoms can radiate down to his legs/ankles.  Currently he has \"no pain\".  His last hemoglobin A1c on 3/22 was 7.9.    ROS:   Pos for CC.  The patient denies current nausea, vomiting, chills, fevers, belly pain, calf pain, chest pain or SOB.  Complete remainder of ROS is otherwise neg.    VITALS:    Vitals:    02/17/23 0726   BP: 124/82   Weight: 69.9 kg (154 lb)   Height: 1.683 m (5' 6.25\")       PMH:  No past medical history on file.    SXHX:    Past Surgical History:   Procedure Laterality Date     ZZC APPENDECTOMY      Description: Appendectomy;  Recorded: 08/27/2008;        MEDS:    Current Outpatient Medications   Medication     ACCU-CHEK COMPACT PLUS test strip     metFORMIN (GLUCOPHAGE) 500 MG tablet     mi-zr-WQ-vit R-uunao-nfwq-zeax (OCUVITE EYE PLUS MULTI) 200- mcg Tab     ONE TOUCH DELICA LANCETS MISC     simvastatin (ZOCOR) 40 MG tablet     No current facility-administered medications for this visit.       ALL:   No Known Allergies    FMH:  No family history on file.    SocHx:    Social History     Socioeconomic History     Marital status:      Spouse name: Not on file     Number of children: Not on file     Years of education: Not on file     Highest education level: Not on file   Occupational History     Not on file   Tobacco Use     Smoking status: Never     Smokeless tobacco: Never   Substance and Sexual Activity     Alcohol use: No     Comment: Alcoholic Drinks/day: rare     Drug use: No     Sexual activity: Not on file   Other " Topics Concern     Not on file   Social History Narrative     Not on file     Social Determinants of Health     Financial Resource Strain: Not on file   Food Insecurity: Not on file   Transportation Needs: Not on file   Physical Activity: Not on file   Stress: Not on file   Social Connections: Not on file   Intimate Partner Violence: Not on file   Housing Stability: Not on file           EXAMINATION:  Gen:   No apparent distress  Neuro:   A&Ox3, no deficits  Psych:    Answering questions appropriately for age and situation with normal affect  Head:    NCAT  Eye:    Visual scanning without deficit  Ear:    Response to auditory stimuli wnl  Lung:    Non-labored breathing on RA noted  Abd:    NTND per patient report  Lymph:    Neg for pitting/non-pitting edema BLE  Vasc:    Pulses palpable, CFT minimally delayed  Neuro:    Tibial nerve/branch exam negative for pain or reproduction of symptoms.  Patient does have paresthesias in the plantar foot.  Derm:    Chronic tinea pedis and onychomycosis.  MSK:    ROM, strength wnl without limitation, no pain on palpation noted.  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  Diabetes mellitus with peripheral neuropathy in setting of low back/sciatic nerve pathology  -I personally reviewed and interpreted the patient's lower extremity history pertinent to today's visit, including imaging/labs, in preparation for initiating a treatment program.  -Regarding the patient's diabetes, we dispensed and discussed proper diabetic foot care.  This includes closely monitoring their blood sugars, closely monitoring their blood pressures, and evaluating their feet at least once per day.  We also discussed potential problems associated with barefoot/sock ambulation and soaking  their feet.    -nail care handout dispensed  -Regarding his neuropathy, we discussed this may be secondary to diabetes versus low back pathology.  There is no evidence today of lower extremity nerve entrapment.  We discussed options for neurology, lower back assessment, pain management referrals.  He will call as needed if he would like to proceed with any of those options    Follow up:  prn or sooner with acute issues      Patient's medical history was reviewed today      William Gorman DPM [facility] FACFA[facility]  Podiatric Foot & Ankle Surgeon  [facility]  [phone]    Disclaimer: This note consists of symbols derived from keyboarding, dictation and/or voice recognition software. As a result, there may be errors in the script that have gone undetected. Please consider this when interpreting information found in this chart.

## 2023-02-17 NOTE — LETTER
"    2/17/2023         RE: Anthony Evangelista  [address] [address]        Dear Colleague,    Thank you for referring your patient, Anthony Evangelista, to the [facility]. Please see a copy of my visit note below.    Foot & Ankle Surgery  February 17, 2023    CC: \"checking my feet\"    I was asked to see Anthony Evangelista regarding the chief complaint by:  Dr. ELPIDIO Higuera    HPI:  Pt is a 82 year old male who presents with above complaint.  Multiple month history of bilateral lower extremity issues.  He describes \"tingling\", \"like walking on like sandpaper\".  Pain 1 out of 10 \"like pain when walking\".  \"Nothing\" for treatment.  He does have sciatic nerve issues and the symptoms can radiate down to his legs/ankles.  Currently he has \"no pain\".  His last hemoglobin A1c on 3/22 was 7.9.    ROS:   Pos for CC.  The patient denies current nausea, vomiting, chills, fevers, belly pain, calf pain, chest pain or SOB.  Complete remainder of ROS is otherwise neg.    VITALS:    Vitals:    02/17/23 0726   BP: 124/82   Weight: 69.9 kg (154 lb)   Height: 1.683 m (5' 6.25\")       PMH:  No past medical history on file.    SXHX:    Past Surgical History:   Procedure Laterality Date     ZZC APPENDECTOMY      Description: Appendectomy;  Recorded: 08/27/2008;        MEDS:    Current Outpatient Medications   Medication     ACCU-CHEK COMPACT PLUS test strip     metFORMIN (GLUCOPHAGE) 500 MG tablet     ht-ly-DB-vit W-vnrfd-gikp-zeax (OCUVITE EYE PLUS MULTI) 200- mcg Tab     ONE TOUCH DELICA LANCETS MISC     simvastatin (ZOCOR) 40 MG tablet     No current facility-administered medications for this visit.       ALL:   No Known Allergies    FMH:  No family history on file.    SocHx:    Social History     Socioeconomic History     Marital status:      Spouse name: Not on file     Number of children: Not on file     Years of education: Not on file     Highest education level: Not on file "   Occupational History     Not on file   Tobacco Use     Smoking status: Never     Smokeless tobacco: Never   Substance and Sexual Activity     Alcohol use: No     Comment: Alcoholic Drinks/day: rare     Drug use: No     Sexual activity: Not on file   Other Topics Concern     Not on file   Social History Narrative     Not on file     Social Determinants of Health     Financial Resource Strain: Not on file   Food Insecurity: Not on file   Transportation Needs: Not on file   Physical Activity: Not on file   Stress: Not on file   Social Connections: Not on file   Intimate Partner Violence: Not on file   Housing Stability: Not on file           EXAMINATION:  Gen:   No apparent distress  Neuro:   A&Ox3, no deficits  Psych:    Answering questions appropriately for age and situation with normal affect  Head:    NCAT  Eye:    Visual scanning without deficit  Ear:    Response to auditory stimuli wnl  Lung:    Non-labored breathing on RA noted  Abd:    NTND per patient report  Lymph:    Neg for pitting/non-pitting edema BLE  Vasc:    Pulses palpable, CFT minimally delayed  Neuro:    Tibial nerve/branch exam negative for pain or reproduction of symptoms.  Patient does have paresthesias in the plantar foot.  Derm:    Chronic tinea pedis and onychomycosis.  MSK:    ROM, strength wnl without limitation, no pain on palpation noted.  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  Diabetes mellitus with peripheral neuropathy in setting of low back/sciatic nerve pathology  -I personally reviewed and interpreted the patient's lower extremity history pertinent to today's visit, including imaging/labs, in preparation for initiating a treatment program.  -Regarding the patient's diabetes, we dispensed and discussed proper  diabetic foot care.  This includes closely monitoring their blood sugars, closely monitoring their blood pressures, and evaluating their feet at least once per day.  We also discussed potential problems associated with barefoot/sock ambulation and soaking their feet.    -nail care handout dispensed  -Regarding his neuropathy, we discussed this may be secondary to diabetes versus low back pathology.  There is no evidence today of lower extremity nerve entrapment.  We discussed options for neurology, lower back assessment, pain management referrals.  He will call as needed if he would like to proceed with any of those options    Follow up:  prn or sooner with acute issues      Patient's medical history was reviewed today      William Gorman DPM FACFAS FACFAOM  Podiatric Foot & Ankle Surgeon  [facility]  [phone]    Disclaimer: This note consists of symbols derived from keyboarding, dictation and/or voice recognition software. As a result, there may be errors in the script that have gone undetected. Please consider this when interpreting information found in this chart.            Again, thank you for allowing me to participate in the care of your patient.        Sincerely,        William Gorman DPM, JODY

## 2023-02-17 NOTE — PATIENT INSTRUCTIONS
"Thank you for choosing [facility] Podiatry / Foot & Ankle Surgery!    DR. EDWARDS'S CLINIC LOCATIONS:     [facility] (Friday) TRIAGE LINE: [phone] [address] [facility]  APPOINTMENTS: [phone]   [place] [address] RADIOLOGY: [phone]    PHYSICAL THERAPY: [phone]    SET UP SURGERY: [phone]   [facility] (Mon-Tues AM-Thurs) BILLING QUESTIONS: [phone] [address] FAX: [phone]   [place] Gutier[place] [address]           DIABETES AND YOUR FEET  Diabetes can result in several problems in the feet including ulcers (open sores) and amputations. Two of the most important reasons why people develop foot problems when they have diabetes is : 1. Neuropathy (loss of feeling)  2. Vascular disease (loss or decrease of blood flow).    Neuropathy is a term used to describe a loss of nerve function.  Patients with diabetes are at risk of developing neuropathy if their sugars continue to run high and are above the normal value. One theory for neuropathy is that the \"extra\" sugar in the body enters the nerves and is broken down. These by-products build up in the nerve causing it to swell and impairing nerve function. Often times, this can be prevented by controlling your sugars, dieting and exercise.    When a person develops neuropathy, they usually begin to feel numbness or tingling in their feet and sometime in their legs.  Other symptoms may include painful burning or hot feet, tingling or feeling like insects or ants are crawling on your feet or legs.  If the diabetes is sever and the sugars run high for long periods of time, neuropathy can also occur in the hands.    Vascular disease  is a term used to describe a loss or decrease in circulation (blood flow). There is a problem in getting blood and oxygen to areas that need it. Similar to neuropathy, sugars can build up in the walls of the arteries (blood vessels) and cause them to become swollen, thickened and hardened. This " decreases the amount of blood that can go to an area that needs it. Though this is common in the legs of diabetic patients, it can also affect other arteries (blood vessels) in the body such as in the heart and eyes.    In the legs, vascular disease usually results in cramping. Patients who develop leg cramps after walking the same distance every time (i.e. One block, half a mile, ect.) need to let their doctors know so that their circulation may be checked. Cramps causing severe pain in the feet and/or legs while sleeping and the cramps go away when you stand or hang your legs off the side of the bed, may also be a sign of poor blood circulation.  Occasional cramping in cold weather or on rare occasions with activity may not be due to poor circulation, but you should inform your doctor.    PREVENTION OF THESE DISEASES  The key to prevention is good blood sugar control. Poor blood sugar control is a big reason many of these problems start. Physical activity (exercise) is a very good way to help decrease your blood sugars. Exercise can lower your blood sugar, blood pressure, and cholesterol. It also reduces your risk for heart disease and stroke, relieves stress, and strengthens your heart, muscles and bones.  In addition, regular activity helps insulin work better, improves your blood circulation, and keeps your joints flexible. If you're trying to lose weight, a combination of exercise and wise food choices can help you reach your target weight and maintain it.      PAIN MANAGEMENT (**Please speak with your primary doctor about any medications**)  1.Blood Sugar Control - Most important  2. Medications such as:  Amytriptylline, duloxetine, gabapentin, lyrica, tramadol (talk with your primary care doctor about this).     NUTRITION:  Nutrition is also important to help with healing. If your body does not have what it needs, it can't heal.   Increasing your protein intake is important.  With wounds you need 60-90gm of  "protein a day to help with healing. Over the counter protein shakes such as Erik, Glucerna, Ensure, ect... can help to supplement your daily protein intake.   It is also important to take Vitamins to help with healing.  Vitamins such as B12, B6 and Vitamin D3 are important for healing. These can be gotten over the counter at pharmacies or at stores like [employer] or the [employer].    I can also prescribe a dietary supplement called \"Rheumate\" that has a lot of essential vitamins in one capsule.  This may not be covered by insurance though.     FOOT CARE RECOMMENDATIONS   1. Wash your feet with lukewarm water and a mild soap and then dry them thoroughly, especially between the toes.     2. Examine your feet daily looking for cuts, corns, blisters, cracks, ect, especially after wearing new shoes. Make sure to look between your toes. If you cannot see the bottom of your feet, set a mirror on the floor and hold your foot over it, or ask a spouse, friend or family member to examine your feet for you. Contact your doctor immediately if new problems are noted or if sores are not healing.     3. Immediately apply moisturizer to the tops and bottoms of your feet, avoiding areas between the toes. Hand lotion (Intesive Care, Jeri, Eucerin, Neutrogena, Curel, ect) is sufficient unless your doctor prescribes a medicated lotion. Apply sunscreen to your feet when going swimming outside.     4. Use clean comfortable shoes, wear white socks (if you have any bleeding or drainage, you will see it on white socks). Socks should not have thick seams or cut off the circulation around the leg. Break in new shoes slowly and rotate with older shoes until broken in. Check the inside of your shoes with your hand to look for areas of irritation or objects that may have fallen into your shoes.       5. Keep slippers by the side of your bed for use during the night.     6.  Shoes should be fitted by a professional and should not cause areas of " irritation.  Check your feet regularly when wearing a new pair of shoes and replace them as needed.     7.  Talk to your doctor about proper exercise. Exercise and stretching stimulate blood flow to your feet and maintain proper glucose levels.     8.  Monitor your blood glucose level as instructed by your doctor. Notify your doctor immediately if your blood sugar is abnormally high or low.    9. Cut your nails straight across, but then gently round any sharp edges with a cardboard nail file. If you have neuropathy, peripheral vascular disease or cannot see that well to trim your own toenails contact [employer] ([phone]) or [employer] ([phone]).      THINGS TO AVOID DOING   1.  Do not soak your feet if you have an open sore. Use only lukewarm water and always check the temperature with your hand as hot water can easily burn your feet.       2.  Never use a hot water bottle or heating pad on your feet. Also do not apply cold compresses to your feet. With decreased sensation, you could burn or freeze your feet.       3.  Do not apply any of these to your feet:    -  Over the counter medicine for corns or warts    -  Harsh chemicals like boric acid    -  Do not self-treat corns, cuts, blisters or infections. Always consult your doctor.       4.  Do not wear sandals, slippers or walk barefoot, especially on hot sand or concrete or other harsh surfaces.     5.  If you smoke, stop!!!     FOOT CARE FOR ADULTS  Foot care is important for everyone, and especially for some elderly people and people with known diabetes or peripheral vascular disease. Washing your feet daily and inspecting them carefully may prevent serious problems.   Wash feet daily, using warm water and a mild soap. Avoid prolonged soaking. The water should feel just warm to your wrist. Dry feet with a soft towel, and make sure to dry between toes.   Soften dry skin by using lotion. If you have calluses, you may carefully use a nonabrasive  product, like a washcloth or buffpuff, to remove dead skin. Inspect your feet for cracks, blisters, bumps, infection and injury. Feel for warmth. Use a mirror to examine the bottom ofyour feet, or have someone check them for you. Keep toenails trimmed to follow curve oftoe, eliminating any sharp edges. If you cannot reach your feet, have someone help you with nail trimming and calluses.   Choose shoes that are shaped like your feet. The fit should be comfortably loose and toes should have room to wiggle freely. Wear thick, soft, cushiony socks with shoes. Change them daily. Inspect shoes before putting on for any hidden foreign objects or rough areas.   Exercise your feet. Walking is a good, safe way to improve the circulation to your feet. Avoid injury and callus formation by choosing the correct shoe for the activity. Slow to a comfortable pace and monitor your feet for signs of irritation.   Treat minor injuries promptly. Clean with soap and water, then cover with soft gauze.   Report failure to heal, numbness, swelling, or break in skin to your physician. Report foot injuries and injections, or symptoms such as tenderness, swelling, redness, warmth, or drainage to your physician promptly.

## 2023-03-02 ENCOUNTER — OFFICE VISIT (OUTPATIENT)
Dept: INTERNAL MEDICINE | Facility: CLINIC | Age: 83
End: 2023-03-02
Payer: MEDICARE

## 2023-03-02 VITALS
WEIGHT: 155 LBS | BODY MASS INDEX: 24.33 KG/M2 | HEIGHT: 67 IN | DIASTOLIC BLOOD PRESSURE: 66 MMHG | OXYGEN SATURATION: 99 % | SYSTOLIC BLOOD PRESSURE: 120 MMHG | HEART RATE: 74 BPM | RESPIRATION RATE: 20 BRPM | TEMPERATURE: 98.3 F

## 2023-03-02 DIAGNOSIS — E11.8 TYPE 2 DIABETES MELLITUS WITH COMPLICATION, WITHOUT LONG-TERM CURRENT USE OF INSULIN (H): Primary | ICD-10-CM

## 2023-03-02 DIAGNOSIS — Z00.00 ROUTINE GENERAL MEDICAL EXAMINATION AT A HEALTH CARE FACILITY: ICD-10-CM

## 2023-03-02 DIAGNOSIS — Z12.5 SCREENING FOR PROSTATE CANCER: ICD-10-CM

## 2023-03-02 LAB
ALBUMIN SERPL BCG-MCNC: 4.6 G/DL (ref 3.5–5.2)
ALBUMIN UR-MCNC: NEGATIVE MG/DL
ALP SERPL-CCNC: 75 U/L (ref 40–129)
ALT SERPL W P-5'-P-CCNC: 20 U/L (ref 10–50)
ANION GAP SERPL CALCULATED.3IONS-SCNC: 12 MMOL/L (ref 7–15)
APPEARANCE UR: CLEAR
AST SERPL W P-5'-P-CCNC: 31 U/L (ref 10–50)
BILIRUB SERPL-MCNC: 0.7 MG/DL
BILIRUB UR QL STRIP: NEGATIVE
BUN SERPL-MCNC: 24.6 MG/DL (ref 8–23)
CALCIUM SERPL-MCNC: 9.1 MG/DL (ref 8.8–10.2)
CHLORIDE SERPL-SCNC: 100 MMOL/L (ref 98–107)
CHOLEST SERPL-MCNC: 145 MG/DL
COLOR UR AUTO: YELLOW
CREAT SERPL-MCNC: 1.16 MG/DL (ref 0.67–1.17)
DEPRECATED HCO3 PLAS-SCNC: 25 MMOL/L (ref 22–29)
ERYTHROCYTE [DISTWIDTH] IN BLOOD BY AUTOMATED COUNT: 12.8 % (ref 10–15)
GFR SERPL CREATININE-BSD FRML MDRD: 63 ML/MIN/1.73M2
GLUCOSE SERPL-MCNC: 174 MG/DL (ref 70–99)
GLUCOSE UR STRIP-MCNC: NEGATIVE MG/DL
HCT VFR BLD AUTO: 43 % (ref 40–53)
HDLC SERPL-MCNC: 55 MG/DL
HGB BLD-MCNC: 14.9 G/DL (ref 13.3–17.7)
HGB UR QL STRIP: ABNORMAL
KETONES UR STRIP-MCNC: NEGATIVE MG/DL
LDLC SERPL CALC-MCNC: 73 MG/DL
LEUKOCYTE ESTERASE UR QL STRIP: NEGATIVE
MCH RBC QN AUTO: 31.7 PG (ref 26.5–33)
MCHC RBC AUTO-ENTMCNC: 34.7 G/DL (ref 31.5–36.5)
MCV RBC AUTO: 92 FL (ref 78–100)
NITRATE UR QL: NEGATIVE
NONHDLC SERPL-MCNC: 90 MG/DL
PH UR STRIP: 6 [PH] (ref 5–8)
PLATELET # BLD AUTO: 201 10E3/UL (ref 150–450)
POTASSIUM SERPL-SCNC: 4.3 MMOL/L (ref 3.4–5.3)
PROT SERPL-MCNC: 7.1 G/DL (ref 6.4–8.3)
PSA SERPL-MCNC: 4.73 NG/ML
RBC # BLD AUTO: 4.7 10E6/UL (ref 4.4–5.9)
RBC #/AREA URNS AUTO: NORMAL /HPF
SODIUM SERPL-SCNC: 137 MMOL/L (ref 136–145)
SP GR UR STRIP: 1.01 (ref 1–1.03)
TRIGL SERPL-MCNC: 83 MG/DL
TSH SERPL DL<=0.005 MIU/L-ACNC: 3.45 UIU/ML (ref 0.3–4.2)
UROBILINOGEN UR STRIP-ACNC: 0.2 E.U./DL
WBC # BLD AUTO: 7.6 10E3/UL (ref 4–11)
WBC #/AREA URNS AUTO: NORMAL /HPF

## 2023-03-02 PROCEDURE — G0439 PPPS, SUBSEQ VISIT: HCPCS | Performed by: INTERNAL MEDICINE

## 2023-03-02 PROCEDURE — 80061 LIPID PANEL: CPT | Performed by: INTERNAL MEDICINE

## 2023-03-02 PROCEDURE — 83036 HEMOGLOBIN GLYCOSYLATED A1C: CPT | Performed by: INTERNAL MEDICINE

## 2023-03-02 PROCEDURE — 84443 ASSAY THYROID STIM HORMONE: CPT | Performed by: INTERNAL MEDICINE

## 2023-03-02 PROCEDURE — 85027 COMPLETE CBC AUTOMATED: CPT | Performed by: INTERNAL MEDICINE

## 2023-03-02 PROCEDURE — 80053 COMPREHEN METABOLIC PANEL: CPT | Performed by: INTERNAL MEDICINE

## 2023-03-02 PROCEDURE — 81001 URINALYSIS AUTO W/SCOPE: CPT | Performed by: INTERNAL MEDICINE

## 2023-03-02 PROCEDURE — G0103 PSA SCREENING: HCPCS | Performed by: INTERNAL MEDICINE

## 2023-03-02 PROCEDURE — 36415 COLL VENOUS BLD VENIPUNCTURE: CPT | Performed by: INTERNAL MEDICINE

## 2023-03-02 ASSESSMENT — ENCOUNTER SYMPTOMS
FREQUENCY: 0
ARTHRALGIAS: 0
DYSURIA: 0
CONSTIPATION: 0
SHORTNESS OF BREATH: 0
ABDOMINAL PAIN: 0
NERVOUS/ANXIOUS: 0
PARESTHESIAS: 0
DIZZINESS: 0
DIARRHEA: 0
NAUSEA: 0
HEMATOCHEZIA: 0
CHILLS: 0
COUGH: 0
HEARTBURN: 0
WEAKNESS: 0
MYALGIAS: 1
FEVER: 0
HEADACHES: 0
EYE PAIN: 0
HEMATURIA: 0
SORE THROAT: 0
PALPITATIONS: 0
JOINT SWELLING: 0

## 2023-03-02 ASSESSMENT — ACTIVITIES OF DAILY LIVING (ADL): CURRENT_FUNCTION: NO ASSISTANCE NEEDED

## 2023-03-02 ASSESSMENT — PATIENT HEALTH QUESTIONNAIRE - PHQ9
SUM OF ALL RESPONSES TO PHQ QUESTIONS 1-9: 8
SUM OF ALL RESPONSES TO PHQ QUESTIONS 1-9: 8
10. IF YOU CHECKED OFF ANY PROBLEMS, HOW DIFFICULT HAVE THESE PROBLEMS MADE IT FOR YOU TO DO YOUR WORK, TAKE CARE OF THINGS AT HOME, OR GET ALONG WITH OTHER PEOPLE: NOT DIFFICULT AT ALL

## 2023-03-02 NOTE — PROGRESS NOTES
Annual Wellness Visit:  Anthony Evangelista  is a 82 year old male  who presents for an annual wellness visit.  Annual wellness visit and physical examination screen for prostate cancer.  We had a good discussion today check Cologuard plus hemogram comprehensive metabolic profile lipid panel PSA TSH urinalysis.  Physician and patient sharing was accomplished.    Advance care planning done.    Falls risk assessment also accomplished.    Cognitive assessment was completed and the current provider this examiner and patient sharing was also completed.  Assessment/Plan:  Annual wellness visit and physical exam and screen for prostate cancer.  Dr. Mcdonough's name from spine care given for sciatica.  Previously seen by Dr. Beatriz Villanueva.    Subjective:   Medical History:    Non-smoker rare alcohol    No allergies.  Appendectomy tonsillectomy only surgeries.  No anesthetic complications.    Diabetes mellitus type 2 hyperlipidemia and mild case of sciatica.  Questions regarding test tips referred to medical assistance.  No past medical history on file.  Current Outpatient Medications   Medication     ACCU-CHEK COMPACT PLUS test strip     metFORMIN (GLUCOPHAGE) 500 MG tablet     dd-pg-ME-vit M-kknoa-cndm-zeax (OCUVITE EYE PLUS MULTI) 200- mcg Tab     ONE TOUCH DELICA LANCETS MISC     simvastatin (ZOCOR) 40 MG tablet     No current facility-administered medications for this visit.     Immunization History   Administered Date(s) Administered     COVID-19 Vaccine 12+ (Pfizer 2022) 07/25/2022     COVID-19 Vaccine 12+ (Pfizer) 01/27/2021, 02/17/2021, 08/31/2021     DT (PEDS <7y) 03/04/2004     Flu, Unspecified 11/06/2007, 10/07/2008, 09/22/2009, 10/08/2010, 10/28/2011, 10/22/2014     Influenza (High Dose) 3 valent vaccine 10/21/2015, 10/17/2016, 10/24/2017, 10/08/2018, 10/15/2019     Influenza Vaccine 65+ (Fluzone HD) 10/18/2020, 10/29/2021, 10/26/2022     Influenza Vaccine, 6+MO IM (QUADRIVALENT W/PRESERVATIVES)  "10/05/2012, 10/22/2013     Pneumo Conj 13-V (2010&after) 12/10/2015     Pneumococcal 23 valent 10/25/2006     Td,adult,historic,unspecified 2004     Tdap (Adacel,Boostrix) 07/15/2013       Surgical History:  Past Surgical History:   Procedure Laterality Date     [facility] APPENDECTOMY      Description: Appendectomy;  Recorded: 2008;        Family History:  Mother  78 cancer and MS.    Father  MI at 56.  1 daughter  aortic dissection 53.  1 grandchild with asked Buerger's syndrome functional.  29.  1 son 57.  History of diverticulitis wife with chronic lung disease also patient of this examiner.  Scarring prior history of pneumonia.    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  Added emotional stress.    Health Maintenances:  Immunizations reviewed and up-to-date 5 years ago had colonoscopy.  Once test other than colonoscopy done to assess his colon.  Cologuard ordered.    Objective:  /66 (BP Location: Right arm, Patient Position: Sitting, Cuff Size: Adult Large)   Pulse 74   Temp 98.3  F (36.8  C)   Resp 20   Ht 1.702 m (5' 7\")   Wt 70.3 kg (155 lb)   SpO2 99%   BMI 24.28 kg/m    Easily conversant good spirited and highly intelligent.  Appears younger than stated age.  Back straight no severe spine tenderness.  Chest clear heart tones normal abdomen benign extremities free of edema neck veins nondistended no thyromegaly or scleral icterus prostate enlarged without nodularity genital examination was negative no groin hernias.  Rest of exam negative head normocephalic.  Eyes ears nose and throat check.    Merlin Higuera MD    Internal Medicine  Answers for HPI/ROS submitted by the patient on 3/2/2023  If you checked off any problems, how difficult have these problems made it for you to do your work, take care of things at home, or get along with other people?: Not difficult at all  PHQ9 TOTAL SCORE: 8  In general, how would you rate your overall physical health?: " "good  Frequency of exercise:: 2-3 days/week  Do you usually eat at least 4 servings of fruit and vegetables a day, include whole grains & fiber, and avoid regularly eating high fat or \"junk\" foods? : Yes  Taking medications regularly:: Yes  Medication side effects:: None  Activities of Daily Living: no assistance needed  Home safety: throw rugs in the hallway, no safety concerns identified  Hearing Impairment:: feel that people are mumbling or not speaking clearly, difficulty understanding soft or whispered speech  In the past 6 months, have you been bothered by leaking of urine?: No  abdominal pain: No  Blood in stool: No  Blood in urine: No  chest pain: No  chills: No  congestion: No  constipation: No  cough: No  diarrhea: No  dizziness: No  ear pain: No  eye pain: No  nervous/anxious: No  fever: No  frequency: No  genital sores: No  headaches: No  hearing loss: No  heartburn: No  arthralgias: No  joint swelling: No  peripheral edema: No  myalgias: Yes  nausea: No  dysuria: No  palpitations: No  Skin sensation changes: No  sore throat: No  urgency: No  rash: No  shortness of breath: No  visual disturbance: Yes  weakness: No  impotence: No  penile discharge: No  In general, how would you rate your overall mental or emotional health?: good  Additional concerns today:: Yes  Duration of exercise:: 15-30 minutes      "

## 2023-03-02 NOTE — PROGRESS NOTES
"SUBJECTIVE:   Anthony is a 82 year old who presents for Preventive Visit.  {Split Bill scripting  The purpose of this visit is to discuss your medical history and prevent health problems before you are sick. You may be responsible for a co-pay, coinsurance, or deductible if your visit today includes services such as checking on a sore throat, having an x-ray or lab test, or treating and evaluating a new or existing condition :805063}  Patient has been advised of split billing requirements and indicates understanding: Yes  Are you in the first 12 months of your Medicare coverage?  No    Healthy Habits:     In general, how would you rate your overall health?  Good    Frequency of exercise:  2-3 days/week    Duration of exercise:  15-30 minutes    Do you usually eat at least 4 servings of fruit and vegetables a day, include whole grains    & fiber and avoid regularly eating high fat or \"junk\" foods?  Yes    Taking medications regularly:  Yes    Medication side effects:  None    Ability to successfully perform activities of daily living:  No assistance needed    Home Safety:  Throw rugs in the hallway and no safety concerns identified    Hearing Impairment:  Feel that people are mumbling or not speaking clearly and difficulty understanding soft or whispered speech    In the past 6 months, have you been bothered by leaking of urine?  No    In general, how would you rate your overall mental or emotional health?  Good      PHQ-2 Total Score: 2    Additional concerns today:  Yes      Have you ever done Advance Care Planning? (For example, a Health Directive, POLST, or a discussion with a medical provider or your loved ones about your wishes): Yes, advance care planning is on file.    {Hearing Test Done (Optional):374565}   Fall risk  Fallen 2 or more times in the past year?: No  Any fall with injury in the past year?: No  {If any of the above assessments are answered yes, consider ordering appropriate referrals " "(Optional):930201::\"click delete button to remove this line now\"}  Cognitive Screening   1) Repeat 3 items (Leader, Season, Table)  {Get patient's attention, then say, \"I am going to say three words that I want you to remember now and later.  The words are Leader, Season, and Table.  Please say them for me now.\"  If pt. unable after 3 tries, go to next item}  2) Clock draw: {Say the following phrases in order: \"Please draw a clock.  Start by drawing a large [place].  Put all the numbers in the [place] and set the hands to show 11:10 (10 past 11).\"  If pt cannot complete in 3 minutes, stop and ask for recall items.  \"NORMAL\" test = all twelve numbers in correct location, and clock hands correctly designating 11:10}ABNORMAL wrong time   3) 3 item recall: {Say: \"What were the three words I asked you to remember?\"}Recalls 3 objects  Results: 3 items recalled: COGNITIVE IMPAIRMENT LESS LIKELY    Mini-CogTM Copyright S Garcia. Licensed by the author for use in [facility]; reprinted with permission (soob@[facility]). All rights reserved.      {Do you have sleep apnea, excessive snoring or daytime drowsiness? (Optional):249359}    Reviewed and updated as needed this visit by clinical staff   Tobacco  Allergies  Meds              Reviewed and updated as needed this visit by Provider                 Social History     Tobacco Use     Smoking status: Never     Smokeless tobacco: Never   Substance Use Topics     Alcohol use: No     Comment: Alcoholic Drinks/day: rare     {Rooming staff  Click this link to complete the Prescreen if response below is not for today's visit  Alcohol Use Prescreen >3 drinks/day or > 7 drinks/week.  If the prescreen question answer is YES, complete the full AUDIT  :948417}    Alcohol Use 3/2/2023   Prescreen: >3 drinks/day or >7 drinks/week? No   {add AUDIT responses (Optional) (A score of 7 for adult men is an indication of hazardous drinking; a score of 8 or more is an indication of an " alcohol use disorder.  A score of 7 or more for adult women is an indication of hazardous drinking or an alchohol use disorder):401463}    {Outside tests to abstract? :077926}    {additional problems to add (Optional):128344}    Current providers sharing in care for this patient include: {Rooming staff:  Please update Care Team from storyboard, refresh this note and then delete this statement}  Patient Care Team:  Merlin Higuera MD as PCP - General  Merlin Higuera MD as Assigned PCP  Beatriz Villanueva DO as Assigned Neuroscience Provider    The following health maintenance items are reviewed in Epic and correct as of today:  Health Maintenance   Topic Date Due     ANNUAL REVIEW OF HM ORDERS  Never done     ZOSTER IMMUNIZATION (1 of 2) Never done     MICROALBUMIN  08/25/2021     EYE EXAM  06/03/2022     COVID-19 Vaccine (5 - Booster for Pfizer series) 09/19/2022     A1C  09/29/2022     MEDICARE ANNUAL WELLNESS VISIT  03/29/2023     BMP  03/29/2023     LIPID  03/29/2023     DIABETIC FOOT EXAM  03/29/2023     DTAP/TDAP/TD IMMUNIZATION (2 - Td or Tdap) 07/15/2023     FALL RISK ASSESSMENT  03/02/2024     ADVANCE CARE PLANNING  03/26/2026     PHQ-2 (once per calendar year)  Completed     INFLUENZA VACCINE  Completed     Pneumococcal Vaccine: 65+ Years  Completed     IPV IMMUNIZATION  Aged Out     MENINGITIS IMMUNIZATION  Aged Out     {Chronicprobdata (optional):668645}  {Decision Support (Optional):373120}        Review of Systems   Constitutional: Negative for chills and fever.   HENT: Negative for congestion, ear pain, hearing loss and sore throat.    Eyes: Positive for visual disturbance. Negative for pain.   Respiratory: Negative for cough and shortness of breath.    Cardiovascular: Negative for chest pain, palpitations and peripheral edema.   Gastrointestinal: Negative for abdominal pain, constipation, diarrhea, heartburn, hematochezia and nausea.   Genitourinary: Negative for dysuria,  "frequency, genital sores, hematuria, impotence, penile discharge and urgency.   Musculoskeletal: Positive for myalgias. Negative for arthralgias and joint swelling.   Skin: Negative for rash.   Neurological: Negative for dizziness, weakness, headaches and paresthesias.   Psychiatric/Behavioral: The patient is not nervous/anxious.      {ROS COMP (Optional):025294}    OBJECTIVE:   /66 (BP Location: Right arm, Patient Position: Sitting, Cuff Size: Adult Large)   Pulse 74   Temp 98.3  F (36.8  C)   Resp 20   Ht 1.702 m (5' 7\")   Wt 70.3 kg (155 lb)   SpO2 99%   BMI 24.28 kg/m   Estimated body mass index is 24.28 kg/m  as calculated from the following:    Height as of this encounter: 1.702 m (5' 7\").    Weight as of this encounter: 70.3 kg (155 lb).  Physical Exam  {Exam (Optional) :621552}    {Diagnostic Test Results (Optional):965689::\"Diagnostic Test Results:\",\"Labs reviewed in Epic\"}    ASSESSMENT / PLAN:   {Diag Picklist:709815}    {Patient advised of split billing (Optional):995285}      COUNSELING:  {Medicare Counselin}        He reports that he has never smoked. He has never used smokeless tobacco.      Appropriate preventive services were discussed with this patient, including applicable screening as appropriate for cardiovascular disease, diabetes, osteopenia/osteoporosis, and glaucoma.  As appropriate for age/gender, discussed screening for colorectal cancer, prostate cancer, breast cancer, and cervical cancer. Checklist reviewing preventive services available has been given to the patient.    Reviewed patients plan of care and provided an AVS. The {CarePlan:419182} for Anthony meets the Care Plan requirement. This Care Plan has been established and reviewed with the {PATIENT, FAMILY MEMBER, CAREGIVER:231268}.    {Counseling Resources  [place] Preventive Services Task Force  Cholesterol Screening  Health diet/nutrition  Pooled Cohorts Equation Calculator  [employer]'s MyPlate  ASA Prophylaxis  Lung CA " Screening  Osteoporosis prevention/bone health :914272}  {Prostate Cancer Screening  Consider for men 55-69 per guidance from USPSTF :958678}    Merlin Higuera MD  [facility]    Identified Health Risks:  Answers for HPI/ROS submitted by the patient on 3/2/2023  If you checked off any problems, how difficult have these problems made it for you to do your work, take care of things at home, or get along with other people?: Not difficult at all  PHQ9 TOTAL SCORE: 8

## 2023-03-03 ENCOUNTER — TELEPHONE (OUTPATIENT)
Dept: INTERNAL MEDICINE | Facility: CLINIC | Age: 83
End: 2023-03-03
Payer: MEDICARE

## 2023-03-03 DIAGNOSIS — E11.8 TYPE 2 DIABETES MELLITUS WITH COMPLICATION, WITHOUT LONG-TERM CURRENT USE OF INSULIN (H): Primary | ICD-10-CM

## 2023-03-03 LAB — HBA1C MFR BLD: 8.5 % (ref 0–5.6)

## 2023-03-03 NOTE — TELEPHONE ENCOUNTER
----- Message from Merlin Higuera MD sent at 3/3/2023 12:53 PM CST -----  Too high and would recommend Jardiance 10 mg tablets dispense number 30 tablets take 1 daily for 1 month then advance to Jardiance 25 mg tablets dispense #30 take 1 tablet daily on an indefinite basis with 11 refills.  Please call patient and pharmacy.    Jardiance is an excellent drug that will help lower the A1c improved blood sugar and also has a cardioprotective effect this would be an ideal drug to take along with the meds that you are already on to improve your blood sugar control.  We need to get the A1c less than 8 that is preferable.  Please call patient and pharmacy.

## 2023-03-03 NOTE — TELEPHONE ENCOUNTER
LMTCB if patient calls back please relay below message and see if he is willing to start on medication. If he is willing to route back to staff, PCP will need to put refill in.

## 2023-03-06 NOTE — TELEPHONE ENCOUNTER
Patient calling back in regards to VM below.  Message from provider relayed.  Patient expressed understanding and is agreeable to starting new medication.  Please send to [facility] pharmacy on file and patient will start as soon as he is able to pick it up.

## 2023-03-27 ENCOUNTER — TELEPHONE (OUTPATIENT)
Dept: PHYSICAL MEDICINE AND REHAB | Facility: CLINIC | Age: 83
End: 2023-03-27
Payer: MEDICARE

## 2023-03-27 NOTE — TELEPHONE ENCOUNTER
[facility] Call [facility]    Phone Message    May a detailed message be left on voicemail: yes     Reason for Call: Other: Pt would like to talk to someone about Dr Rich Russell replacements . Wants a little more background on the providers that will take over his care      Action Taken: Other: [facility]    Travel Screening: Not Applicable

## 2023-03-27 NOTE — TELEPHONE ENCOUNTER
Call placed to pt to discuss. Answered all of patients questions. Pt currently scheduled with Dr. Lozano on 4/27 and he will keep this appointment.

## 2023-04-02 ENCOUNTER — LAB (OUTPATIENT)
Dept: INTERNAL MEDICINE | Facility: CLINIC | Age: 83
End: 2023-04-02
Payer: MEDICARE

## 2023-04-02 DIAGNOSIS — Z00.00 ROUTINE GENERAL MEDICAL EXAMINATION AT A HEALTH CARE FACILITY: ICD-10-CM

## 2023-04-02 DIAGNOSIS — Z12.11 SCREENING FOR COLORECTAL CANCER: Primary | ICD-10-CM

## 2023-04-02 DIAGNOSIS — Z12.12 SCREENING FOR COLORECTAL CANCER: Primary | ICD-10-CM

## 2023-04-03 ENCOUNTER — LAB (OUTPATIENT)
Dept: INTERNAL MEDICINE | Facility: CLINIC | Age: 83
End: 2023-04-03
Payer: MEDICARE

## 2023-04-03 DIAGNOSIS — Z12.12 SCREENING FOR COLORECTAL CANCER: ICD-10-CM

## 2023-04-03 DIAGNOSIS — Z12.11 SCREENING FOR COLORECTAL CANCER: ICD-10-CM

## 2023-04-10 DIAGNOSIS — E11.9 DIABETES (H): ICD-10-CM

## 2023-04-10 RX ORDER — SIMVASTATIN 40 MG
TABLET ORAL
Qty: 90 TABLET | Refills: 3 | Status: SHIPPED | OUTPATIENT
Start: 2023-04-10 | End: 2024-09-17

## 2023-04-10 NOTE — TELEPHONE ENCOUNTER
"Last Written Prescription Date:  4/11/2022  Last Fill Quantity: 90,  # refills: 3   Last office visit provider:  3/2/2023     Requested Prescriptions   Pending Prescriptions Disp Refills     simvastatin (ZOCOR) 40 MG tablet [Pharmacy Med Name: SIMVASTATIN 40MG TABLETS] 90 tablet 3     Sig: TAKE 1 TABLET(40 MG) BY MOUTH DAILY       Statins Protocol Passed - 4/10/2023  4:19 AM        Passed - LDL on file in past 12 months     Recent Labs   Lab Test 03/02/23  0749   LDL 73             Passed - No abnormal creatine kinase in past 12 months     No lab results found.             Passed - Recent (12 mo) or future (30 days) visit within the authorizing provider's specialty     Patient has had an office visit with the authorizing provider or a provider within the authorizing providers department within the previous 12 mos or has a future within next 30 days. See \"Patient Info\" tab in inbasket, or \"Choose Columns\" in Meds & Orders section of the refill encounter.              Passed - Medication is active on med list        Passed - Patient is age 18 or older             MARNIE HERRERA RN 04/10/23 3:43 PM  "

## 2023-04-11 RX ORDER — SIMVASTATIN 40 MG
TABLET ORAL
Qty: 90 TABLET | Refills: 3 | Status: SHIPPED | OUTPATIENT
Start: 2023-04-11 | End: 2024-09-17

## 2023-04-11 NOTE — TELEPHONE ENCOUNTER
"Routing refill request to provider for review/approval because:  Drug not active on patient's medication list  There is another simvastatin that is active on his med list      Last Written Prescription Date:  na  Last Fill Quantity: na,  # refills: na   Last office visit provider:  3/2/2023     Requested Prescriptions   Pending Prescriptions Disp Refills     simvastatin (ZOCOR) 40 MG tablet [Pharmacy Med Name: SIMVASTATIN 40MG TABLETS] 90 tablet 3     Sig: TAKE 1 TABLET(40 MG) BY MOUTH DAILY       Statins Protocol Passed - 4/10/2023  8:33 AM        Passed - LDL on file in past 12 months     Recent Labs   Lab Test 03/02/23  0749   LDL 73             Passed - No abnormal creatine kinase in past 12 months     No lab results found.             Passed - Recent (12 mo) or future (30 days) visit within the authorizing provider's specialty     Patient has had an office visit with the authorizing provider or a provider within the authorizing providers department within the previous 12 mos or has a future within next 30 days. See \"Patient Info\" tab in inbasket, or \"Choose Columns\" in Meds & Orders section of the refill encounter.              Passed - Medication is active on med list        Passed - Patient is age 18 or older             Debbi Khan RN 04/11/23 12:09 AM  "

## 2023-04-27 ENCOUNTER — OFFICE VISIT (OUTPATIENT)
Dept: PHYSICAL MEDICINE AND REHAB | Facility: CLINIC | Age: 83
End: 2023-04-27
Payer: MEDICARE

## 2023-04-27 VITALS — DIASTOLIC BLOOD PRESSURE: 58 MMHG | HEART RATE: 72 BPM | SYSTOLIC BLOOD PRESSURE: 121 MMHG

## 2023-04-27 DIAGNOSIS — M48.062 SPINAL STENOSIS OF LUMBAR REGION WITH NEUROGENIC CLAUDICATION: Primary | ICD-10-CM

## 2023-04-27 DIAGNOSIS — M43.16 SPONDYLOLISTHESIS OF LUMBAR REGION: ICD-10-CM

## 2023-04-27 DIAGNOSIS — R29.898 RIGHT LEG WEAKNESS: ICD-10-CM

## 2023-04-27 DIAGNOSIS — M48.061 LUMBAR FORAMINAL STENOSIS: ICD-10-CM

## 2023-04-27 PROCEDURE — 99214 OFFICE O/P EST MOD 30 MIN: CPT | Performed by: PHYSICAL MEDICINE & REHABILITATION

## 2023-04-27 ASSESSMENT — PAIN SCALES - GENERAL: PAINLEVEL: MODERATE PAIN (5)

## 2023-04-27 NOTE — LETTER
4/27/2023         RE: Anthony Evangelista  [address], [place], operated by [facility] [address]        Dear Colleague,    Thank you for referring your patient, Anthony Evangelista, to the [facility]. Please see a copy of my visit note below.    Assessment/Plan:      Anthony was seen today for back pain.    Diagnoses and all orders for this visit:    Spinal stenosis of lumbar region with neurogenic claudication  -     XR Lumbar Spine 2/3 Views; Future  -     PAIN Transforaminal ZURI Inj Lumbosacral One Level Bilateral; Future    Spondylolisthesis of lumbar region  -     XR Lumbar Spine 2/3 Views; Future  -     PAIN Transforaminal ZURI Inj Lumbosacral One Level Bilateral; Future    Lumbar foraminal stenosis    Right leg weakness         Assessment: Pleasant 82 year old male with past medical history significant for type 2 diabetes, benign prostatic hypertrophy, hyperlipidemia with:    1.  Chronic bilateral lower extremity radicular pain/claudication symptoms with accompanying right great toe extensor weakness and loss of left Achilles reflex.  This is related to severe spinal stenosis at L4-5 and L3-4 with grade 1 degenerative spondylolisthesis L4-5.  He also has foraminal stenosis bilaterally L5-S1 which is more moderate.   The stenosis results in clumping of the nerve roots at L4-5.  Prior EMG showed findings consistent with a chronic and inactive left S1 radiculopathy and possible chronic and inactive right L5 radiculopathy.      Discussion:    1.  I discussed the diagnosis and treatment options with the patient today.  This includes conservative management further epidurals.  We will need to keep close eye on the right lower extremity weakness.   He was to continue conservative management for now.  Is not interested in surgical evaluation.      2.  Recommend bilateral L4-5 TFESI.  He had 80% relief from his prior injection for at least 3 months.  We will move left L4-5 at the level of stenosis  as he has stenosis cephalad to that at L3-4 as well to see if this provides longer lasting relief.    3.  Flexion-extension x-rays lumbar spine evaluate for instability of the L4-5 spondylolisthesis.    4.  Can consider updated MRI if no improvement from  Injection.    5.  Follow-up at his earliest convenience for lumbar epidural.    It was our pleasure caring for your patient today, if there any questions or concerns please do not hesitate to contact us.    35 minutes were spent on the date of the encounter performing chart review, patient visit and documentation in addition to any procedure.    Subjective:   Patient ID: Anthony Evangelista is a 82 year old male.    History of Present Illness: Patient presents for evaluation of bilateral gluteal pain and lower extremity pain and paresthesias with ambulation.  Previous patient of Dr. Kathleen.  Has had physical therapy in the past doing his home exercises 3 to 4 days/week and has done very well.  His bilateral gluteal pain and lower extremity pain paresthesias been present for 5 to 6 years with no trauma.  Progressively worsening over time.  He has significant pain and paresthesias down the posterior lateral legs to the feet with walking quickly or with any bending and lifting.  Better with sitting with ibuprofen.  Can walk slowly without issues and no significant back pain.  His buttock pain and leg symptoms are 5/10 at worst and today 2/10 at best.  Ibuprofen in the morning seems to help.    1 year ago had bilateral L5-S1 TFESI.  He reports 80% relief for at least 3 months and his symptoms are slowly worsened again.  Wondering what the diagnosis is neck steps in care.  Does carry diagnosis of diabetes mellitus    EMG: May 2021 prolonged morning potential duration left medial gastrocnemius muscle can be consistent with/observed in the left S1 radiculopathy chronic and inactive.  Also changes of the right EDB which can potentially be observed in a chronic and inactive  right L5 radiculopathy.      Imaging: *MRI April 2021 imaging report personally reviewed for medical decision-making purposes.  Severe spinal stenosis L3-4 L4-5 with moderate central stenosis and lateral recess stenosis L2-3.  L5-S1 shows right paracentral disc herniation with bilateral facet arthropathy results in mild narrowing of the right lateral recess contacting the right S1 nerve severe bilateral foraminal stenosis.  Clipping of the nerve roots at L4-5.    Review of Systems: Pertinent positives: Bilateral leg paresthesias.  Pertinent negatives: No  weakness.  No bowel or bladder incontinence.  No urinary retention.  No fevers, unintentional weight loss, balance changes, headaches, frequent falling, difficulty swallowing, or coordination difficulties.  All others reviewed are negative.    No past medical history on file.    The following portions of the patient's history were reviewed and updated as appropriate: allergies, current medications, past family history, past medical history, past social history, past surgical history and problem list.           Objective:   Physical Exam:    /58   Pulse 72   There is no height or weight on file to calculate BMI.      General: Alert and oriented with normal affect. Attention, knowledge, memory, and language are intact. No acute distress.   Eyes: Sclerae are clear.  Respirations: Unlabored. CV: No lower extremity edema.  Skin: No rashes seen.    Gait:  Nonantalgic  Sensation is intact to light touch throughout the upper and lower extremities.  Reflexes are 2+ and symmetric in the biceps triceps and brachioradialis with negative Hoffmans. 2+ patellar and 1+ right 0 left Achilles      Manual muscle testing reveals:  Right /Left out of 5     5/5 finger flexors  5/5 hip flexors  5/5 knee flexors  5/5 knee extensors  5/5 ankle plantar flexors  5/5 ankle dorsiflexors  4+ /5  [facility]          Again, thank you for allowing me to participate in the care of your patient.         Sincerely,        Bassam Lozano, DO

## 2023-04-27 NOTE — PROGRESS NOTES
Assessment/Plan:      Anthony was seen today for back pain.    Diagnoses and all orders for this visit:    Spinal stenosis of lumbar region with neurogenic claudication  -     XR Lumbar Spine 2/3 Views; Future  -     PAIN Transforaminal ZURI Inj Lumbosacral One Level Bilateral; Future    Spondylolisthesis of lumbar region  -     XR Lumbar Spine 2/3 Views; Future  -     PAIN Transforaminal ZURI Inj Lumbosacral One Level Bilateral; Future    Lumbar foraminal stenosis    Right leg weakness         Assessment: Pleasant 82 year old male with past medical history significant for type 2 diabetes, benign prostatic hypertrophy, hyperlipidemia with:    1.  Chronic bilateral lower extremity radicular pain/claudication symptoms with accompanying right great toe extensor weakness and loss of left Achilles reflex.  This is related to severe spinal stenosis at L4-5 and L3-4 with grade 1 degenerative spondylolisthesis L4-5.  He also has foraminal stenosis bilaterally L5-S1 which is more moderate.   The stenosis results in clumping of the nerve roots at L4-5.  Prior EMG showed findings consistent with a chronic and inactive left S1 radiculopathy and possible chronic and inactive right L5 radiculopathy.      Discussion:    1.  I discussed the diagnosis and treatment options with the patient today.  This includes conservative management further epidurals.  We will need to keep close eye on the right lower extremity weakness.   He was to continue conservative management for now.  Is not interested in surgical evaluation.      2.  Recommend bilateral L4-5 TFESI.  He had 80% relief from his prior injection for at least 3 months.  We will move left L4-5 at the level of stenosis as he has stenosis cephalad to that at L3-4 as well to see if this provides longer lasting relief.    3.  Flexion-extension x-rays lumbar spine evaluate for instability of the L4-5 spondylolisthesis.    4.  Can consider updated MRI if no improvement from   Injection.    5.  Follow-up at his earliest convenience for lumbar epidural.    It was our pleasure caring for your patient today, if there any questions or concerns please do not hesitate to contact us.    35 minutes were spent on the date of the encounter performing chart review, patient visit and documentation in addition to any procedure.    Subjective:   Patient ID: Anthony Evangelista is a 82 year old male.    History of Present Illness: Patient presents for evaluation of bilateral gluteal pain and lower extremity pain and paresthesias with ambulation.  Previous patient of Dr. Kathleen.  Has had physical therapy in the past doing his home exercises 3 to 4 days/week and has done very well.  His bilateral gluteal pain and lower extremity pain paresthesias been present for 5 to 6 years with no trauma.  Progressively worsening over time.  He has significant pain and paresthesias down the posterior lateral legs to the feet with walking quickly or with any bending and lifting.  Better with sitting with ibuprofen.  Can walk slowly without issues and no significant back pain.  His buttock pain and leg symptoms are 5/10 at worst and today 2/10 at best.  Ibuprofen in the morning seems to help.    1 year ago had bilateral L5-S1 TFESI.  He reports 80% relief for at least 3 months and his symptoms are slowly worsened again.  Wondering what the diagnosis is neck steps in care.  Does carry diagnosis of diabetes mellitus    EMG: May 2021 prolonged morning potential duration left medial gastrocnemius muscle can be consistent with/observed in the left S1 radiculopathy chronic and inactive.  Also changes of the right EDB which can potentially be observed in a chronic and inactive right L5 radiculopathy.      Imaging: *MRI April 2021 imaging report personally reviewed for medical decision-making purposes.  Severe spinal stenosis L3-4 L4-5 with moderate central stenosis and lateral recess stenosis L2-3.  L5-S1 shows right  paracentral disc herniation with bilateral facet arthropathy results in mild narrowing of the right lateral recess contacting the right S1 nerve severe bilateral foraminal stenosis.  Clipping of the nerve roots at L4-5.    Review of Systems: Pertinent positives: Bilateral leg paresthesias.  Pertinent negatives: No  weakness.  No bowel or bladder incontinence.  No urinary retention.  No fevers, unintentional weight loss, balance changes, headaches, frequent falling, difficulty swallowing, or coordination difficulties.  All others reviewed are negative.    No past medical history on file.    The following portions of the patient's history were reviewed and updated as appropriate: allergies, current medications, past family history, past medical history, past social history, past surgical history and problem list.           Objective:   Physical Exam:    /58   Pulse 72   There is no height or weight on file to calculate BMI.      General: Alert and oriented with normal affect. Attention, knowledge, memory, and language are intact. No acute distress.   Eyes: Sclerae are clear.  Respirations: Unlabored. CV: No lower extremity edema.  Skin: No rashes seen.    Gait:  Nonantalgic  Sensation is intact to light touch throughout the upper and lower extremities.  Reflexes are 2+ and symmetric in the biceps triceps and brachioradialis with negative Hoffmans. 2+ patellar and 1+ right 0 left Achilles      Manual muscle testing reveals:  Right /Left out of 5     5/5 finger flexors  5/5 hip flexors  5/5 knee flexors  5/5 knee extensors  5/5 ankle plantar flexors  5/5 ankle dorsiflexors  4+ /5  [facility]

## 2023-04-27 NOTE — PATIENT INSTRUCTIONS
A Lumbar epidural has been ordered today. Please schedule this injection at least  2 weeks from now to allow time for insurance prior authorization. On the day of your injection, you cannot be sick or taking antibiotics. If you become sick and are prescribed, please call the clinic so your injection can be rescheduled for once you have completed your antibiotics. You will need to bring a  with you for your injection. If you have any questions or concerns prior to your injection, please do not hesitate to call the nurse navigation line at [phone].   An xray was ordered for you today.  Please call Radiology at [phone].

## 2023-05-03 ENCOUNTER — HOSPITAL ENCOUNTER (OUTPATIENT)
Dept: GENERAL RADIOLOGY | Facility: HOSPITAL | Age: 83
Discharge: HOME | End: 2023-05-03
Attending: PHYSICAL MEDICINE & REHABILITATION | Admitting: PHYSICAL MEDICINE & REHABILITATION
Payer: MEDICARE

## 2023-05-03 DIAGNOSIS — M43.16 SPONDYLOLISTHESIS OF LUMBAR REGION: ICD-10-CM

## 2023-05-03 DIAGNOSIS — M48.062 SPINAL STENOSIS OF LUMBAR REGION WITH NEUROGENIC CLAUDICATION: ICD-10-CM

## 2023-05-03 PROCEDURE — 72100 X-RAY EXAM L-S SPINE 2/3 VWS: CPT

## 2023-05-12 ENCOUNTER — MYC MEDICAL ADVICE (OUTPATIENT)
Dept: INTERNAL MEDICINE | Facility: CLINIC | Age: 83
End: 2023-05-12
Payer: MEDICARE

## 2023-05-24 ENCOUNTER — RADIOLOGY INJECTION OFFICE VISIT (OUTPATIENT)
Dept: PHYSICAL MEDICINE AND REHAB | Facility: CLINIC | Age: 83
End: 2023-05-24
Attending: PHYSICAL MEDICINE & REHABILITATION
Payer: MEDICARE

## 2023-05-24 VITALS
TEMPERATURE: 98 F | SYSTOLIC BLOOD PRESSURE: 130 MMHG | DIASTOLIC BLOOD PRESSURE: 70 MMHG | HEART RATE: 68 BPM | OXYGEN SATURATION: 99 % | RESPIRATION RATE: 16 BRPM

## 2023-05-24 DIAGNOSIS — M43.16 SPONDYLOLISTHESIS OF LUMBAR REGION: ICD-10-CM

## 2023-05-24 DIAGNOSIS — M48.062 SPINAL STENOSIS OF LUMBAR REGION WITH NEUROGENIC CLAUDICATION: ICD-10-CM

## 2023-05-24 DIAGNOSIS — E11.9 DIABETES MELLITUS (H): Primary | ICD-10-CM

## 2023-05-24 LAB — GLUCOSE SERPL-MCNC: 146 MG/DL (ref 70–99)

## 2023-05-24 PROCEDURE — 82962 GLUCOSE BLOOD TEST: CPT | Performed by: PAIN MEDICINE

## 2023-05-24 PROCEDURE — 64483 NJX AA&/STRD TFRM EPI L/S 1: CPT | Mod: 50 | Performed by: PAIN MEDICINE

## 2023-05-24 RX ORDER — LIDOCAINE HYDROCHLORIDE 10 MG/ML
INJECTION, SOLUTION EPIDURAL; INFILTRATION; INTRACAUDAL; PERINEURAL
Status: COMPLETED | OUTPATIENT
Start: 2023-05-24 | End: 2023-05-24

## 2023-05-24 RX ORDER — DEXAMETHASONE SODIUM PHOSPHATE 10 MG/ML
INJECTION, SOLUTION INTRAMUSCULAR; INTRAVENOUS
Status: COMPLETED | OUTPATIENT
Start: 2023-05-24 | End: 2023-05-24

## 2023-05-24 RX ADMIN — DEXAMETHASONE SODIUM PHOSPHATE 20 MG: 10 INJECTION, SOLUTION INTRAMUSCULAR; INTRAVENOUS at 10:13

## 2023-05-24 RX ADMIN — LIDOCAINE HYDROCHLORIDE 4 ML: 10 INJECTION, SOLUTION EPIDURAL; INFILTRATION; INTRACAUDAL; PERINEURAL at 10:13

## 2023-05-24 ASSESSMENT — PAIN SCALES - GENERAL
PAINLEVEL: SEVERE PAIN (6)
PAINLEVEL: SEVERE PAIN (6)

## 2023-05-24 NOTE — PATIENT INSTRUCTIONS
DISCHARGE INSTRUCTIONS    During office hours (8:00 a.m.- 4:00 p.m.) questions or concerns may be answered  by calling Spine Center Navigation Nurses at  [phone].  Messages received after hours will be returned the following business day.      In the case of an emergency, please dial 911 or seek assistance at the nearest Emergency Room/Urgent Care facility.     All Patients:    You may experience an increase in your symptoms for the first 2 days (It may take anywhere between 2 days- 2 weeks for the steroid to have maximum effect).    You may use ice on the injection site, as frequently as 20 minutes each hour if needed.    You may take your pain medicine.    You may continue taking your regular medication after your injection. If you have had a Medial Branch Block you may resume pain medication once your pain diary is completed.    You may shower. No swimming, tub bath or hot tub for 48 hours.  You may remove your bandaid/bandage as soon as you are home.    You may resume light activities, as tolerated.    Resume your usual diet as tolerated.    It is strongly advised that you do not drive for 1-3 hours post injection.    If you have had oral sedation:  Do not drive for 8 hours post injection.      If you have had IV sedation:  Do not drive for 24 hours post injection.  Do not operate hazardous machinery or make important personal/business decisions for 24 hours.      POSSIBLE STEROID SIDE EFFECTS (If steroid/cortisone was used for your procedure)    -If you experience these symptoms, it should only last for a short period    Swelling of the legs              Skin redness (flushing)     Mouth (oral) irritation   Blood sugar (glucose) levels            Sweats                    Mood changes  Headache  Sleeplessness  Weakened immune system for up to 14 days, which could increase the risk of gilles the COVID-19 virus and/or experiencing more severe symptoms of the disease, if exposed.  Decreased  effectiveness of the flu vaccine if given within 2 weeks of the steroid.         POSSIBLE PROCEDURE SIDE EFFECTS  -Call the Spine Center if you are concerned  Increased Pain           Increased numbness/tingling      Nausea/Vomiting          Bruising/bleeding at site      Redness or swelling                                              Difficulty walking      Weakness           Fever greater than 100.5    *In the event of a severe headache after an epidural steroid injection that is relieved by lying down, please call the [facility] to speak with a clinical staff member*     Please be advised that your blood glucose levels may be increased for the next 5-7 days as a result of the steroid you received with your injection today.  It is recommended that you monitor your blood glucose levels closely over the next week and direct any additional questions regarding your blood glucose management to your primary doctor.

## 2023-08-08 ENCOUNTER — TRANSFERRED RECORDS (OUTPATIENT)
Dept: HEALTH INFORMATION MANAGEMENT | Facility: CLINIC | Age: 83
End: 2023-08-08
Payer: MEDICARE

## 2023-08-14 ENCOUNTER — TRANSFERRED RECORDS (OUTPATIENT)
Dept: MULTI SPECIALTY CLINIC | Facility: CLINIC | Age: 83
End: 2023-08-14
Payer: MEDICARE

## 2023-08-14 LAB — RETINOPATHY: NORMAL

## 2023-08-17 ENCOUNTER — OFFICE VISIT (OUTPATIENT)
Dept: INTERNAL MEDICINE | Facility: CLINIC | Age: 83
End: 2023-08-17
Payer: MEDICARE

## 2023-08-17 VITALS
BODY MASS INDEX: 22.63 KG/M2 | SYSTOLIC BLOOD PRESSURE: 138 MMHG | WEIGHT: 144.2 LBS | OXYGEN SATURATION: 98 % | HEART RATE: 74 BPM | RESPIRATION RATE: 17 BRPM | DIASTOLIC BLOOD PRESSURE: 78 MMHG | HEIGHT: 67 IN

## 2023-08-17 DIAGNOSIS — E11.8 TYPE 2 DIABETES MELLITUS WITH COMPLICATION, WITHOUT LONG-TERM CURRENT USE OF INSULIN (H): Primary | ICD-10-CM

## 2023-08-17 LAB
CHOLEST SERPL-MCNC: 159 MG/DL
CREAT UR-MCNC: 66.3 MG/DL
FASTING STATUS PATIENT QL REPORTED: ABNORMAL
GLUCOSE SERPL-MCNC: 173 MG/DL (ref 70–99)
HBA1C MFR BLD: 7.9 % (ref 0–5.6)
HDLC SERPL-MCNC: 61 MG/DL
LDLC SERPL CALC-MCNC: 82 MG/DL
MICROALBUMIN UR-MCNC: <12 MG/L
MICROALBUMIN/CREAT UR: NORMAL MG/G{CREAT}
NONHDLC SERPL-MCNC: 98 MG/DL
TRIGL SERPL-MCNC: 81 MG/DL

## 2023-08-17 PROCEDURE — 80061 LIPID PANEL: CPT | Performed by: INTERNAL MEDICINE

## 2023-08-17 PROCEDURE — 83036 HEMOGLOBIN GLYCOSYLATED A1C: CPT | Performed by: INTERNAL MEDICINE

## 2023-08-17 PROCEDURE — 82570 ASSAY OF URINE CREATININE: CPT | Performed by: INTERNAL MEDICINE

## 2023-08-17 PROCEDURE — 82043 UR ALBUMIN QUANTITATIVE: CPT | Performed by: INTERNAL MEDICINE

## 2023-08-17 PROCEDURE — 36415 COLL VENOUS BLD VENIPUNCTURE: CPT | Performed by: INTERNAL MEDICINE

## 2023-08-17 PROCEDURE — 99214 OFFICE O/P EST MOD 30 MIN: CPT | Performed by: INTERNAL MEDICINE

## 2023-08-17 PROCEDURE — 82947 ASSAY GLUCOSE BLOOD QUANT: CPT | Performed by: INTERNAL MEDICINE

## 2023-08-17 ASSESSMENT — PAIN SCALES - GENERAL: PAINLEVEL: NO PAIN (0)

## 2023-08-17 NOTE — PROGRESS NOTES
"Assessment/Plan:    Diabetes mellitus type 2 check A1c blood sugar lipid panel today.  Blood sugars at home runs between 130 and 150 fasting.  On Jardiance.  Tolerates it well.    Hypertension with mild systolic elevation today 138/78 stable continue same.    Hyperlipidemia on statin therapy no target organ damage related to same.    Sciatica left-sided continue core strengthening exercises.  Weight down 13 to 14 pounds.  Walking emphasized.    Occasionally forgetful discussed reassured.    Recent ophthalmologic and dermatologic examinations all clear.  MD supervising.    25 minutes spent on the date of the encounter doing chart review, patient visit, and documentation     Subjective:  Anthony Evangelista is a 82 year old male presents for the following health issues weight down 13 to 14 pounds eating well on Jardiance.  Blood sugars are improved on Jardiance 1 30-1 50 now.    Blood pressures at home 120/80.    Hyperlipidemia tolerating statins well.  Forgetful at times emphasize core strengthening exercises for sciatica.  Walks 3-4 times per week plays golf 3-4 times per week.  Recently seen by ophthalmologist as well as dermatologist [facility] on both accounts.    ROS:  No blood in stool or urine med list reviewed reconciled in the chart.    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    Merlin Higuera MD  Internal Medicine    "

## 2023-09-20 LAB — NONINV COLON CA DNA+OCC BLD SCRN STL QL: NEGATIVE

## 2023-10-16 ENCOUNTER — OFFICE VISIT (OUTPATIENT)
Dept: INTERNAL MEDICINE | Facility: CLINIC | Age: 83
End: 2023-10-16
Payer: MEDICARE

## 2023-10-16 VITALS
WEIGHT: 142 LBS | RESPIRATION RATE: 16 BRPM | DIASTOLIC BLOOD PRESSURE: 74 MMHG | HEART RATE: 75 BPM | TEMPERATURE: 97.6 F | SYSTOLIC BLOOD PRESSURE: 136 MMHG | OXYGEN SATURATION: 99 % | BODY MASS INDEX: 22.24 KG/M2

## 2023-10-16 DIAGNOSIS — E11.8 TYPE 2 DIABETES MELLITUS WITH COMPLICATION, WITHOUT LONG-TERM CURRENT USE OF INSULIN (H): Primary | ICD-10-CM

## 2023-10-16 LAB
CREAT UR-MCNC: 59 MG/DL
FASTING STATUS PATIENT QL REPORTED: YES
GLUCOSE SERPL-MCNC: 119 MG/DL (ref 70–99)
HBA1C MFR BLD: 7.4 % (ref 0–5.6)
MICROALBUMIN UR-MCNC: <12 MG/L
MICROALBUMIN/CREAT UR: NORMAL MG/G{CREAT}

## 2023-10-16 PROCEDURE — 36415 COLL VENOUS BLD VENIPUNCTURE: CPT | Performed by: INTERNAL MEDICINE

## 2023-10-16 PROCEDURE — 82570 ASSAY OF URINE CREATININE: CPT | Performed by: INTERNAL MEDICINE

## 2023-10-16 PROCEDURE — 99214 OFFICE O/P EST MOD 30 MIN: CPT | Performed by: INTERNAL MEDICINE

## 2023-10-16 PROCEDURE — 82947 ASSAY GLUCOSE BLOOD QUANT: CPT | Performed by: INTERNAL MEDICINE

## 2023-10-16 PROCEDURE — 83036 HEMOGLOBIN GLYCOSYLATED A1C: CPT | Performed by: INTERNAL MEDICINE

## 2023-10-16 PROCEDURE — 82043 UR ALBUMIN QUANTITATIVE: CPT | Performed by: INTERNAL MEDICINE

## 2023-10-16 ASSESSMENT — ENCOUNTER SYMPTOMS
ARTHRALGIAS: 0
CONSTIPATION: 0
SORE THROAT: 0
JOINT SWELLING: 0
DIZZINESS: 1
EYE PAIN: 0
HEADACHES: 0
MYALGIAS: 0
HEMATOCHEZIA: 0
DYSURIA: 0
SHORTNESS OF BREATH: 0
PARESTHESIAS: 0
FREQUENCY: 0
CHILLS: 0
FEVER: 0
HEARTBURN: 0
PALPITATIONS: 0
WEAKNESS: 0
HEMATURIA: 0
DIARRHEA: 0
NERVOUS/ANXIOUS: 0
NAUSEA: 0
ABDOMINAL PAIN: 0
COUGH: 0

## 2023-10-16 ASSESSMENT — PATIENT HEALTH QUESTIONNAIRE - PHQ9
SUM OF ALL RESPONSES TO PHQ QUESTIONS 1-9: 4
SUM OF ALL RESPONSES TO PHQ QUESTIONS 1-9: 4
10. IF YOU CHECKED OFF ANY PROBLEMS, HOW DIFFICULT HAVE THESE PROBLEMS MADE IT FOR YOU TO DO YOUR WORK, TAKE CARE OF THINGS AT HOME, OR GET ALONG WITH OTHER PEOPLE: NOT DIFFICULT AT ALL

## 2023-10-16 ASSESSMENT — ACTIVITIES OF DAILY LIVING (ADL): CURRENT_FUNCTION: NO ASSISTANCE NEEDED

## 2023-10-16 NOTE — PROGRESS NOTES
"  {PROVIDER CHARTING PREFERENCE:728614}    Irene Cruz is a 83 year old, presenting for the following health issues:  Follow Up and Diabetes (Check blood sugar everyday )      10/16/2023     3:33 PM   Additional Questions   Roomed by Lyn castañeda   Accompanied by self       Healthy Habits:     In general, how would you rate your overall health?  Good    Frequency of exercise:  4-5 days/week    Duration of exercise:  15-30 minutes    Do you usually eat at least 4 servings of fruit and vegetables a day, include whole grains    & fiber and avoid regularly eating high fat or \"junk\" foods?  Yes    Taking medications regularly:  Yes    Ability to successfully perform activities of daily living:  No assistance needed    Home Safety:  No safety concerns identified    Hearing Impairment:  No hearing concerns    In the past 6 months, have you been bothered by leaking of urine?  No    In general, how would you rate your overall mental or emotional health?  Good    Additional concerns today:  No       {MA/LPN/RN Pre-Provider Visit Orders- hCG/UA/Strep (Optional):324139}  {SUPERLIST (Optional):332664}  Diabetes Follow-up    How often are you checking your blood sugar? One time daily  What time of day are you checking your blood sugars (select all that apply)?  After meals  Have you had any blood sugars above 200?  Yes ***  Have you had any blood sugars below 70?  No  What symptoms do you notice when your blood sugar is low?  None  What concerns do you have today about your diabetes? None   Do you have any of these symptoms? (Select all that apply)  { :802396}      BP Readings from Last 2 Encounters:   10/16/23 136/74   08/17/23 138/78     Hemoglobin A1C (%)   Date Value   08/17/2023 7.9 (H)   03/02/2023 8.5 (H)     LDL Cholesterol Calculated (mg/dL)   Date Value   08/17/2023 82   03/02/2023 73       {Reference  Diabetes Management Resources  Blood Glucose Log - 3 weeks  Blood Glucose Log with Food and Insulin Record " :221301}  How many servings of fruits and vegetables do you eat daily?  { :798275}  On average, how many sweetened beverages do you drink each day (Examples: soda, juice, sweet tea, etc.  Do NOT count diet or artificially sweetened beverages)?   { 1-11:376115}  How many days per week do you exercise enough to make your heart beat faster? { :102468}  How many minutes a day do you exercise enough to make your heart beat faster? { :960717}  How many days per week do you miss taking your medication? {0-7 :136984}      Review of Systems   Constitutional:  Negative for chills and fever.   HENT:  Negative for congestion, ear pain, hearing loss and sore throat.    Eyes:  Negative for pain and visual disturbance.   Respiratory:  Negative for cough and shortness of breath.    Cardiovascular:  Negative for chest pain, palpitations and peripheral edema.   Gastrointestinal:  Negative for abdominal pain, constipation, diarrhea, heartburn, hematochezia and nausea.   Genitourinary:  Negative for dysuria, frequency, genital sores, hematuria, impotence and urgency.   Musculoskeletal:  Negative for arthralgias, joint swelling and myalgias.   Skin:  Negative for rash.   Neurological:  Positive for dizziness. Negative for weakness, headaches and paresthesias.   Psychiatric/Behavioral:  Negative for mood changes. The patient is not nervous/anxious.       {ROS COMP (Optional):946766}      Objective    /74 (BP Location: Right arm, Patient Position: Sitting, Cuff Size: Adult Regular)   Pulse 75   Temp 97.6  F (36.4  C) (Oral)   Resp 16   Wt 64.4 kg (142 lb)   SpO2 99%   BMI 22.24 kg/m    Body mass index is 22.24 kg/m .  Physical Exam   {Exam List (Optional):746446}    {Diagnostic Test Results (Optional):129565}    {AMBULATORY ATTESTATION (Optional):236499}              Answers submitted by the patient for this visit:  Patient Health Questionnaire (Submitted on 10/16/2023)  If you checked off any problems, how difficult have  these problems made it for you to do your work, take care of things at home, or get along with other people?: Not difficult at all  PHQ9 TOTAL SCORE: 4

## 2023-10-16 NOTE — PROGRESS NOTES
Assessment/Plan:    Diabetes mellitus type 2 check today A1c blood sugar lipid panel.  Stable on Jardiance.  Concerns regarding cost.  Continue Jardiance same 25 mg daily.  Plus metformin.  And diet.    Sciatica emphasized the improving core strength.  No help with epidural spinal injections previously.  Left side.    Hyperlipidemia on statin therapy no history of MI or stroke of late.    Cologuard test allCLEAR negative.  We had a good discussion.    25 minutes spent on the date of the encounter doing chart review, patient visit, and documentation     Subjective:  Anthony Evangelista is a 83 year old male presents for the following health issues sciatica left side and lower back.  Help with exercise especially walking.  Emphasize core strengthening exercises like walking to improve sciatica.  Predominantly left-sided.  No help with epidural spinal injections.  Concerns regarding added cost with Jardiance.  Current dose 25 mg daily.    ROS:  No blood in stool or urine denies chest pain shortness of breath medication list reviewed reconciled in the chart.    Objective:  /74 (BP Location: Right arm, Patient Position: Sitting, Cuff Size: Adult Regular)   Pulse 75   Temp 97.6  F (36.4  C) (Oral)   Resp 16   Wt 64.4 kg (142 lb)   SpO2 99%   BMI 22.24 kg/m    Chest clear heart tones normal abdomen mild centripetal obesity noted extremities free of edema cyanosis or clubbing no carotid bruits thyromegaly no neck vein distention.  He appeared well younger than stated age actively playing golf.    Merlin Higuera MD  Internal Medicine    Answers submitted by the patient for this visit:  Patient Health Questionnaire (Submitted on 10/16/2023)  If you checked off any problems, how difficult have these problems made it for you to do your work, take care of things at home, or get along with other people?: Not difficult at all  PHQ9 TOTAL SCORE: 4  Annual Preventive Visit (Submitted on 10/16/2023)  Chief Complaint:  "Annual Exam:  In general, how would you rate your overall physical health?: good  Frequency of exercise:: 4-5 days/week  Do you usually eat at least 4 servings of fruit and vegetables a day, include whole grains & fiber, and avoid regularly eating high fat or \"junk\" foods? : Yes  Taking medications regularly:: Yes  Activities of Daily Living: no assistance needed  Home safety: no safety concerns identified  Hearing Impairment:: no hearing concerns  In the past 6 months, have you been bothered by leaking of urine?: No  abdominal pain: No  Blood in stool: No  Blood in urine: No  chest pain: No  chills: No  congestion: No  constipation: No  cough: No  diarrhea: No  dizziness: Yes  ear pain: No  eye pain: No  nervous/anxious: No  fever: No  frequency: No  genital sores: No  headaches: No  hearing loss: No  heartburn: No  arthralgias: No  joint swelling: No  peripheral edema: No  mood changes: No  myalgias: No  nausea: No  dysuria: No  palpitations: No  Skin sensation changes: No  sore throat: No  urgency: No  rash: No  shortness of breath: No  visual disturbance: No  weakness: No  impotence: No  In general, how would you rate your overall mental or emotional health?: good  Additional concerns today:: No  Exercise outside of work (Submitted on 10/16/2023)  Chief Complaint: Annual Exam:  Duration of exercise:: 15-30 minutes    "

## 2023-10-23 DIAGNOSIS — E11.8 TYPE 2 DIABETES MELLITUS WITH COMPLICATION, WITHOUT LONG-TERM CURRENT USE OF INSULIN (H): ICD-10-CM

## 2024-01-02 ENCOUNTER — TRANSFERRED RECORDS (OUTPATIENT)
Dept: HEALTH INFORMATION MANAGEMENT | Facility: CLINIC | Age: 84
End: 2024-01-02
Payer: MEDICARE

## 2024-02-01 ENCOUNTER — PATIENT OUTREACH (OUTPATIENT)
Dept: CARE COORDINATION | Facility: CLINIC | Age: 84
End: 2024-02-01
Payer: MEDICARE

## 2024-02-15 ENCOUNTER — PATIENT OUTREACH (OUTPATIENT)
Dept: CARE COORDINATION | Facility: CLINIC | Age: 84
End: 2024-02-15
Payer: MEDICARE

## 2024-02-26 ENCOUNTER — OFFICE VISIT (OUTPATIENT)
Dept: INTERNAL MEDICINE | Facility: CLINIC | Age: 84
End: 2024-02-26
Payer: MEDICARE

## 2024-02-26 VITALS
OXYGEN SATURATION: 98 % | RESPIRATION RATE: 14 BRPM | WEIGHT: 145.6 LBS | DIASTOLIC BLOOD PRESSURE: 80 MMHG | BODY MASS INDEX: 22.85 KG/M2 | TEMPERATURE: 97.9 F | HEART RATE: 81 BPM | HEIGHT: 67 IN | SYSTOLIC BLOOD PRESSURE: 152 MMHG

## 2024-02-26 DIAGNOSIS — R19.4 RECENT CHANGE IN FREQUENCY OF BOWEL MOVEMENTS: ICD-10-CM

## 2024-02-26 DIAGNOSIS — Z23 NEED FOR SHINGLES VACCINE: Primary | ICD-10-CM

## 2024-02-26 DIAGNOSIS — Z23 NEED FOR TDAP VACCINATION: ICD-10-CM

## 2024-02-26 DIAGNOSIS — E11.42 TYPE 2 DIABETES MELLITUS WITH PERIPHERAL NEUROPATHY (H): ICD-10-CM

## 2024-02-26 PROCEDURE — 99214 OFFICE O/P EST MOD 30 MIN: CPT | Performed by: INTERNAL MEDICINE

## 2024-02-26 ASSESSMENT — PAIN SCALES - GENERAL: PAINLEVEL: MODERATE PAIN (5)

## 2024-02-26 NOTE — PROGRESS NOTES
"Assessment/Plan:    Recent change in bowel movements.  Metamucil suggested.  IBS.  For completeness CT  abdomen pelvis without contrast ordered.  Recent Cologuard test negative.    Hypertension with mild systolic elevation suggest salt restriction and regular exercise.  Today 152/80 Home blood pressure readings 120/80.    Sciatica left side.  Anticipates spine center consultation soon.  Encouraged core strengthening exercises.    Diabetes mellitus type 2 refill Jardiance 25 mg daily plus metformin.  Regular exercise recommended along with dietary changes.  With next office visit fasting check A1c blood sugar lipid panel.        25 minutes spent on the date of the encounter doing chart review, patient visit, and documentation     Subjective:  Anthony Evangelista is a 83 year old male presents for the following health issues change in bowel movements.  Metamucil suggested.  Questionable IBS.  Sciatica may be contributing to the left side anticipate spine center consultation.  Of note Cologuard testing negative.    ROS:  No blood in stool or urine no weight loss med list reviewed reconciled in the chart.  Denies chest pain or shortness of breath remains active.  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    Objective:  BP (!) 152/80 (BP Location: Right arm, Patient Position: Sitting, Cuff Size: Adult Regular)   Pulse 81   Temp 97.9  F (36.6  C) (Oral)   Resp 14   Ht 1.689 m (5' 6.5\")   Wt 66 kg (145 lb 9.6 oz)   SpO2 98%   BMI 23.15 kg/m    Easily conversant good spirited not in acute distress no carotid bruits left or right no neck vein distention no thyroid enlargement or thyroid nodules no lymphadenopathy appreciated lymph bearing areas chest was clear anteriorly posteriorly heart tones regular rhythm without murmur rub or gallop abdomen revealed mild distention BMI is normal at 23+ there is no masses palpable on abdominal exam bowel sounds present hypoactive no liver " spleen tip palpable nontender no rebound.  No masses.  Extremities free of edema cyanosis or clubbing we had a good discussion today.  And a good examination.    Merlin Higuera MD  Internal Medicine    Answers submitted by the patient for this visit:  Back Pain Visit Questionnaire (Submitted on 2/26/2024)  Your back pain is: recurring  Chronic or Recurring Back Pain Visit Questionnaire (Submitted on 2/26/2024)  Where is your back pain located? : right buttock, left buttock  How would you describe your back pain? : sharp  Where does your back pain spread? : right buttocks, left buttocks, right thigh, left thigh, right foot, left foot  Since you noticed your back pain, how has it changed? : always present, but gets better and worse  Does your back pain interfere with your job?: No  If yes, which:: acetaminophen (Tylenol), activity or exercise, muscle relaxants, Physical Therapy, steroid injection  General Questionnaire (Submitted on 2/26/2024)  Chief Complaint: Chronic problems general questions HPI Form  How many servings of fruits and vegetables do you eat daily?: 2-3  On average, how many sweetened beverages do you drink each day (Examples: soda, juice, sweet tea, etc.  Do NOT count diet or artificially sweetened beverages)?: 0  How many minutes a day do you exercise enough to make your heart beat faster?: 20 to 29  How many days a week do you exercise enough to make your heart beat faster?: 5  How many days per week do you miss taking your medication?: 0

## 2024-03-05 ENCOUNTER — HOSPITAL ENCOUNTER (OUTPATIENT)
Dept: CT IMAGING | Facility: HOSPITAL | Age: 84
Discharge: HOME | End: 2024-03-05
Attending: INTERNAL MEDICINE | Admitting: INTERNAL MEDICINE
Payer: MEDICARE

## 2024-03-05 DIAGNOSIS — R19.4 RECENT CHANGE IN FREQUENCY OF BOWEL MOVEMENTS: ICD-10-CM

## 2024-03-05 LAB
CREAT BLD-MCNC: 1.5 MG/DL (ref 0.7–1.3)
EGFRCR SERPLBLD CKD-EPI 2021: 46 ML/MIN/1.73M2

## 2024-03-05 PROCEDURE — 250N000011 HC RX IP 250 OP 636: Performed by: INTERNAL MEDICINE

## 2024-03-05 PROCEDURE — 82565 ASSAY OF CREATININE: CPT

## 2024-03-05 PROCEDURE — 74177 CT ABD & PELVIS W/CONTRAST: CPT | Mod: MG

## 2024-03-05 PROCEDURE — G1010 CDSM STANSON: HCPCS

## 2024-03-05 RX ORDER — IOPAMIDOL 755 MG/ML
90 INJECTION, SOLUTION INTRAVASCULAR ONCE
Status: COMPLETED | OUTPATIENT
Start: 2024-03-05 | End: 2024-03-05

## 2024-03-05 RX ADMIN — IOPAMIDOL 90 ML: 755 INJECTION, SOLUTION INTRAVENOUS at 09:33

## 2024-03-13 ENCOUNTER — TELEPHONE (OUTPATIENT)
Dept: INTERNAL MEDICINE | Facility: CLINIC | Age: 84
End: 2024-03-13
Payer: MEDICARE

## 2024-03-13 NOTE — TELEPHONE ENCOUNTER
Patient calling in stating he had a CT on 3/5 and has a MRI tomorrow. He is concerned that the CT will effect his MRI.     Writer educated that it is ok to get MRI tomorrow, that the CT is completely different imaging and should not interfere.     No further questions or concerns

## 2024-03-29 DIAGNOSIS — E11.42 TYPE 2 DIABETES MELLITUS WITH PERIPHERAL NEUROPATHY (H): Primary | ICD-10-CM

## 2024-03-29 RX ORDER — EMPAGLIFLOZIN 25 MG/1
25 TABLET, FILM COATED ORAL DAILY
Qty: 330 TABLET | Refills: 0 | OUTPATIENT
Start: 2024-03-29

## 2024-04-16 DIAGNOSIS — E11.9 DIABETES (H): Primary | ICD-10-CM

## 2024-04-16 RX ORDER — SIMVASTATIN 40 MG
40 TABLET ORAL DAILY
Qty: 90 TABLET | Refills: 1 | Status: SHIPPED | OUTPATIENT
Start: 2024-04-16 | End: 2024-09-17

## 2024-04-23 ENCOUNTER — OFFICE VISIT (OUTPATIENT)
Dept: INTERNAL MEDICINE | Facility: CLINIC | Age: 84
End: 2024-04-23
Payer: MEDICARE

## 2024-04-23 VITALS
TEMPERATURE: 97.4 F | DIASTOLIC BLOOD PRESSURE: 62 MMHG | RESPIRATION RATE: 16 BRPM | OXYGEN SATURATION: 98 % | HEART RATE: 77 BPM | SYSTOLIC BLOOD PRESSURE: 124 MMHG | WEIGHT: 143 LBS | HEIGHT: 67 IN | BODY MASS INDEX: 22.44 KG/M2

## 2024-04-23 DIAGNOSIS — Z00.00 ROUTINE GENERAL MEDICAL EXAMINATION AT A HEALTH CARE FACILITY: Primary | ICD-10-CM

## 2024-04-23 DIAGNOSIS — Z12.5 SCREENING FOR PROSTATE CANCER: ICD-10-CM

## 2024-04-23 DIAGNOSIS — Z23 NEED FOR SHINGLES VACCINE: ICD-10-CM

## 2024-04-23 DIAGNOSIS — Z23 NEED FOR TDAP VACCINATION: ICD-10-CM

## 2024-04-23 DIAGNOSIS — E11.42 TYPE 2 DIABETES MELLITUS WITH PERIPHERAL NEUROPATHY (H): ICD-10-CM

## 2024-04-23 DIAGNOSIS — E11.8 TYPE 2 DIABETES MELLITUS WITH COMPLICATION, WITHOUT LONG-TERM CURRENT USE OF INSULIN (H): ICD-10-CM

## 2024-04-23 LAB
ALBUMIN SERPL BCG-MCNC: 4.7 G/DL (ref 3.5–5.2)
ALBUMIN UR-MCNC: NEGATIVE MG/DL
ALP SERPL-CCNC: 79 U/L (ref 40–150)
ALT SERPL W P-5'-P-CCNC: 23 U/L (ref 0–70)
ANION GAP SERPL CALCULATED.3IONS-SCNC: 12 MMOL/L (ref 7–15)
APPEARANCE UR: CLEAR
AST SERPL W P-5'-P-CCNC: 26 U/L (ref 0–45)
BILIRUB SERPL-MCNC: 0.7 MG/DL
BILIRUB UR QL STRIP: NEGATIVE
BUN SERPL-MCNC: 28 MG/DL (ref 8–23)
CALCIUM SERPL-MCNC: 9.5 MG/DL (ref 8.8–10.2)
CHLORIDE SERPL-SCNC: 103 MMOL/L (ref 98–107)
CHOLEST SERPL-MCNC: 139 MG/DL
COLOR UR AUTO: YELLOW
CREAT SERPL-MCNC: 1.24 MG/DL (ref 0.67–1.17)
CREAT UR-MCNC: 80.4 MG/DL
DEPRECATED HCO3 PLAS-SCNC: 25 MMOL/L (ref 22–29)
EGFRCR SERPLBLD CKD-EPI 2021: 58 ML/MIN/1.73M2
ERYTHROCYTE [DISTWIDTH] IN BLOOD BY AUTOMATED COUNT: 13.1 % (ref 10–15)
FASTING STATUS PATIENT QL REPORTED: YES
GLUCOSE SERPL-MCNC: 193 MG/DL (ref 70–99)
GLUCOSE UR STRIP-MCNC: >=1000 MG/DL
HBA1C MFR BLD: 7.9 % (ref 0–5.6)
HCT VFR BLD AUTO: 44.7 % (ref 40–53)
HDLC SERPL-MCNC: 56 MG/DL
HGB BLD-MCNC: 15.4 G/DL (ref 13.3–17.7)
HGB UR QL STRIP: NEGATIVE
KETONES UR STRIP-MCNC: ABNORMAL MG/DL
LDLC SERPL CALC-MCNC: 64 MG/DL
LEUKOCYTE ESTERASE UR QL STRIP: NEGATIVE
MCH RBC QN AUTO: 32 PG (ref 26.5–33)
MCHC RBC AUTO-ENTMCNC: 34.5 G/DL (ref 31.5–36.5)
MCV RBC AUTO: 93 FL (ref 78–100)
MICROALBUMIN UR-MCNC: <12 MG/L
MICROALBUMIN/CREAT UR: NORMAL MG/G{CREAT}
NITRATE UR QL: NEGATIVE
NONHDLC SERPL-MCNC: 83 MG/DL
PH UR STRIP: 5.5 [PH] (ref 5–8)
PLATELET # BLD AUTO: 212 10E3/UL (ref 150–450)
POTASSIUM SERPL-SCNC: 4.5 MMOL/L (ref 3.4–5.3)
PROT SERPL-MCNC: 7.4 G/DL (ref 6.4–8.3)
PSA SERPL DL<=0.01 NG/ML-MCNC: 4.86 NG/ML
RBC # BLD AUTO: 4.81 10E6/UL (ref 4.4–5.9)
SODIUM SERPL-SCNC: 140 MMOL/L (ref 135–145)
SP GR UR STRIP: 1.02 (ref 1–1.03)
TRIGL SERPL-MCNC: 93 MG/DL
TSH SERPL DL<=0.005 MIU/L-ACNC: 2.76 UIU/ML (ref 0.3–4.2)
UROBILINOGEN UR STRIP-ACNC: 0.2 E.U./DL
WBC # BLD AUTO: 9.6 10E3/UL (ref 4–11)

## 2024-04-23 PROCEDURE — 85027 COMPLETE CBC AUTOMATED: CPT | Performed by: INTERNAL MEDICINE

## 2024-04-23 PROCEDURE — 80053 COMPREHEN METABOLIC PANEL: CPT | Performed by: INTERNAL MEDICINE

## 2024-04-23 PROCEDURE — G0439 PPPS, SUBSEQ VISIT: HCPCS | Performed by: INTERNAL MEDICINE

## 2024-04-23 PROCEDURE — 80061 LIPID PANEL: CPT | Performed by: INTERNAL MEDICINE

## 2024-04-23 PROCEDURE — G0103 PSA SCREENING: HCPCS | Performed by: INTERNAL MEDICINE

## 2024-04-23 PROCEDURE — 83036 HEMOGLOBIN GLYCOSYLATED A1C: CPT | Performed by: INTERNAL MEDICINE

## 2024-04-23 PROCEDURE — 81003 URINALYSIS AUTO W/O SCOPE: CPT | Performed by: INTERNAL MEDICINE

## 2024-04-23 PROCEDURE — 84443 ASSAY THYROID STIM HORMONE: CPT | Performed by: INTERNAL MEDICINE

## 2024-04-23 PROCEDURE — 82043 UR ALBUMIN QUANTITATIVE: CPT | Performed by: INTERNAL MEDICINE

## 2024-04-23 PROCEDURE — 82570 ASSAY OF URINE CREATININE: CPT | Performed by: INTERNAL MEDICINE

## 2024-04-23 PROCEDURE — G0102 PROSTATE CA SCREENING; DRE: HCPCS | Performed by: INTERNAL MEDICINE

## 2024-04-23 PROCEDURE — 36415 COLL VENOUS BLD VENIPUNCTURE: CPT | Performed by: INTERNAL MEDICINE

## 2024-04-23 SDOH — HEALTH STABILITY: PHYSICAL HEALTH: ON AVERAGE, HOW MANY DAYS PER WEEK DO YOU ENGAGE IN MODERATE TO STRENUOUS EXERCISE (LIKE A BRISK WALK)?: 3 DAYS

## 2024-04-23 ASSESSMENT — SOCIAL DETERMINANTS OF HEALTH (SDOH): HOW OFTEN DO YOU GET TOGETHER WITH FRIENDS OR RELATIVES?: THREE TIMES A WEEK

## 2024-04-23 NOTE — PROGRESS NOTES
Annual Wellness Visit:  Anthony Evangelista  is a 83 year old male  who presents for an annual wellness visit.  Annual wellness visit and physical exam.  Screen for prostate cancer.  AFUA plus PSA.    Position of patient sharing was accomplished.  We had a good discussion.    I do not prescribe this patient opioids and the patient does not receive opioids from any other provider individual.  The patient's provider list includes myself RICHMOND BRUMFIELD as his primary care general internist and primary provider.  Cognitive assessment was done the patient was able to draw the face of an analog clock but had difficulty remembering 3 items.  Functional capacity ADLs and safety was assessed at normal labs today include hemogram comfortable to follow profile lipid panel A1c urine for microalbumin PSA TSH and urinalysis.  Emotional and mental health is good.    Advance care planning done.    Falls risk assessment also accomplished.    Cognitive assessment was completed and the current provider this examiner and patient sharing was also completed.  Assessment/Plan:  Annual wellness visit and physical examination.  Screen for prostate cancer.  AFUA plus PSA.    Subjective:   Medical History:    Non-smoker rare alcohol no known drug allergies.    No anesthetic complications with any prior surgery.  These include a tonsillectomy herniorrhaphy and an appendectomy.  Prior history of diabetes mellitus type 2 and hyperlipidemia no hypertension today 124/62.  No past medical history on file.  Current Outpatient Medications   Medication Sig Dispense Refill    ACCU-CHEK COMPACT PLUS test strip 1 strip by In Vitro route 2 times daily 100 strip 11    blood glucose (NO BRAND SPECIFIED) test strip Use to test blood sugar 2 times daily or as directed. 100 strip 3    empagliflozin (JARDIANCE) 25 MG TABS tablet Take 1 tablet (25 mg) by mouth daily 30 tablet 11    metFORMIN (GLUCOPHAGE) 500 MG tablet Take 1 tablet (500 mg) by mouth 2 times daily (with  meals) 180 tablet 1    eu-ql-SZ-vit T-mklcy-qbfl-zeax (OCUVITE EYE PLUS MULTI) 200- mcg Tab [PT-XU-FJ-VIT C-VQRXR-XFHZ-ZEAX (OCUVITE EYE PLUS MULTI) 200- MCG TAB] Take 1 tablet by mouth daily. 180 tablet 0    ONE TOUCH DELICA LANCETS MISC [ONE TOUCH DELICA LANCETS MISC] Use As Directed. Test twice daily.      simvastatin (ZOCOR) 40 MG tablet TAKE 1 TABLET(40 MG) BY MOUTH DAILY 90 tablet 3    simvastatin (ZOCOR) 40 MG tablet TAKE 1 TABLET BY MOUTH EVERY DAY 90 tablet 1    simvastatin (ZOCOR) 40 MG tablet TAKE 1 TABLET(40 MG) BY MOUTH DAILY 90 tablet 3     No current facility-administered medications for this visit.     Immunization History   Administered Date(s) Administered    COVID-19 MONOVALENT 12+ (Pfizer) 2021, 2021, 2021    COVID-19 Monovalent 12+ (Pfizer ) 2022    DT (PEDS <7y) 2004    Flu, Unspecified 2007, 10/07/2008, 2009, 10/08/2010, 10/28/2011, 10/22/2014    Influenza (High Dose) 3 valent vaccine 10/21/2015, 10/17/2016, 10/24/2017, 10/08/2018, 10/15/2019    Influenza Vaccine 65+ (FLUAD) 2023    Influenza Vaccine 65+ (Fluzone HD) 10/18/2020, 10/29/2021, 10/26/2022    Influenza Vaccine, 6+MO IM (QUADRIVALENT W/PRESERVATIVES) 10/05/2012, 10/22/2013    Influenza, seasonal, injectable, PF 10/08/2010    Pneumo Conj 13-V (2010&after) 12/10/2015    Pneumococcal 23 valent 10/25/2006    RSV Vaccine (Arexvy) 2023    TDAP (Adacel,Boostrix) 07/15/2013    Td,adult,historic,unspecified 2004       Surgical History:  Past Surgical History:   Procedure Laterality Date    [facility] APPENDECTOMY      Description: Appendectomy;  Recorded: 2008;      Recent MRI scan has shown lumbar spinal stenosis and/or herniated disc with sciatica-like discomfort he has had 2 epidural spinal injections.  Family History:  1 daughter  at age 51 of a bronchial arterial fistula.  Remote history of stroke she was 51 the patient's wife has bronchiectasis living and  "well 1 son 59 with diverticulitis and obesity.  Mother  78 cancer of the liver.    Father  at 56 acute myocardial infarction.    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  The patient gets regular exercise he enjoys walking.    Health Maintenances:  Immunizations and vaccines reviewed and up-to-date.    Objective:  /62 (BP Location: Right arm, Patient Position: Sitting, Cuff Size: Adult Regular)   Pulse 77   Temp 97.4  F (36.3  C) (Oral)   Resp 16   Ht 1.702 m (5' 7\")   Wt 64.9 kg (143 lb)   SpO2 98%   BMI 22.40 kg/m    Easily conversant good spirited appears younger than stated age skin was negative lymph negative neuro negative back straight no severe spine tenderness chest clear heart tones normal abdomen benign no carotid bruits no lymphadenopathy appreciated lymph bearing areas no thyroid enlargement or thyroid nodules no neck vein distention abdomen benign no organomegaly masses or tenderness noted bowel sounds present hypoactive no abdominal bruits the genital examination was normal testicular exam normal no groin hernias rectal showed enlarged prostate 3/4 without nodularity induration nothing to suggest malignancy.  The prostate gland itself was smooth but enlarged rectal exam was otherwise negative no rectal masses brown stool present extremities are free of edema cyanosis or clubbing we had a good discussion good examination today.    Merlin Higuera MD    Internal Medicine  "

## 2024-04-29 DIAGNOSIS — E11.8 TYPE 2 DIABETES MELLITUS WITH COMPLICATION, WITHOUT LONG-TERM CURRENT USE OF INSULIN (H): Primary | ICD-10-CM

## 2024-04-29 RX ORDER — BLOOD SUGAR DIAGNOSTIC
STRIP MISCELLANEOUS
Qty: 100 STRIP | Refills: 0 | Status: SHIPPED | OUTPATIENT
Start: 2024-04-29

## 2024-05-29 DIAGNOSIS — E11.8 TYPE 2 DIABETES MELLITUS WITH COMPLICATION, WITHOUT LONG-TERM CURRENT USE OF INSULIN (H): Primary | ICD-10-CM

## 2024-05-29 NOTE — TELEPHONE ENCOUNTER
"Patient calling stating his pharmacy has zero refills on his metformin.     Last prescription from 2/26/24 \"transmission to pharmacy failed.\"    Writer sent in new prescription today for patient      "

## 2024-08-15 ENCOUNTER — TELEPHONE (OUTPATIENT)
Dept: INTERNAL MEDICINE | Facility: CLINIC | Age: 84
End: 2024-08-15
Payer: MEDICARE

## 2024-08-15 NOTE — TELEPHONE ENCOUNTER
Patient calling to report a cough, sore throat, and feeling weak. He says symptoms started yesterday. Home Covid test was negative today. No fevers, BP is good, and he is eating and drinking without difficulty. He is wondering what PCP would recommend for symptom management.

## 2024-08-15 NOTE — TELEPHONE ENCOUNTER
Called patient to relay below message from provider. Patient verbalized understanding.        Merlin Higuera MD  [facility]2 minutes ago (11:43 AM)     MB  Please call patient for me.    Okay for Robitussin DM cough syrup 1 to 2 teaspoons 4 times a day as needed for cough plus arthritis strength Tylenol 2 tablets 3 times a day on a scheduled basis for the next 5 days drink plenty of fluids and rest.  Benadryl also helps 25 mg to 50 mg at bedtime for cough and postnasal drip and improvement in rest.    If symptoms persist or worsen please asked patient to make a virtual visit with me by phone next week or go to the emergency room for further evaluation and treatment this weekend.  REGAN.

## 2024-08-26 ENCOUNTER — TRANSFERRED RECORDS (OUTPATIENT)
Dept: HEALTH INFORMATION MANAGEMENT | Facility: CLINIC | Age: 84
End: 2024-08-26
Payer: MEDICARE

## 2024-09-17 DIAGNOSIS — E11.9 DIABETES (H): ICD-10-CM

## 2024-09-17 RX ORDER — SIMVASTATIN 40 MG
40 TABLET ORAL DAILY
Qty: 90 TABLET | Refills: 11 | Status: SHIPPED | OUTPATIENT
Start: 2024-09-17

## 2024-10-02 DIAGNOSIS — U07.1 INFECTION DUE TO 2019 NOVEL CORONAVIRUS: Primary | ICD-10-CM

## 2024-12-01 ENCOUNTER — HEALTH MAINTENANCE LETTER (OUTPATIENT)
Age: 84
End: 2024-12-01

## 2024-12-26 PROBLEM — N18.31 CHRONIC KIDNEY DISEASE, STAGE 3A (H): Status: ACTIVE | Noted: 2024-12-26

## 2025-03-24 ENCOUNTER — PATIENT OUTREACH (OUTPATIENT)
Dept: CARE COORDINATION | Facility: CLINIC | Age: 85
End: 2025-03-24
Payer: MEDICARE

## 2025-03-24 DIAGNOSIS — E11.42 TYPE 2 DIABETES MELLITUS WITH PERIPHERAL NEUROPATHY (H): ICD-10-CM

## 2025-03-24 RX ORDER — EMPAGLIFLOZIN 25 MG/1
25 TABLET, FILM COATED ORAL DAILY
Qty: 30 TABLET | Refills: 2 | Status: SHIPPED | OUTPATIENT
Start: 2025-03-24

## 2025-03-24 NOTE — TELEPHONE ENCOUNTER
Medication Question or Refill    Contacts       Contact Date/Time Type Contact Phone/Fax    03/24/2025 10:17 AM CDT Phone (Incoming) Anthony Evangelista (Self) [phone] (M)            What medication are you calling about (include dose and sig)?: Maira    Preferred Pharmacy:   [id number] - [place], [place] - [address]  [address] [place] [address]  Phone: [phone] Fax: [phone]      Controlled Substance Agreement on file:   CSA -- Patient Level:    CSA: None found at the patient level.       Who prescribed the medication?: Dr Higuera     Do you need a refill? Yes    When did you use the medication last? 3/24/2025    Patient offered an appointment? Yes:    Do you have any questions or concerns?  Yes: Patient only has one day left       Could we send this information to you in [facility] or would you prefer to receive a phone call?:   Patient would prefer a phone call   Okay to leave a detailed message?: Yes at Cell number on file:    Telephone Information:   Mobile [phone]      DISPLAY PLAN FREE TEXT DISPLAY PLAN FREE TEXT DISPLAY PLAN FREE TEXT

## 2025-04-24 ENCOUNTER — OFFICE VISIT (OUTPATIENT)
Dept: INTERNAL MEDICINE | Facility: CLINIC | Age: 85
End: 2025-04-24
Payer: MEDICARE

## 2025-04-24 VITALS
DIASTOLIC BLOOD PRESSURE: 78 MMHG | SYSTOLIC BLOOD PRESSURE: 120 MMHG | OXYGEN SATURATION: 98 % | HEART RATE: 90 BPM | RESPIRATION RATE: 16 BRPM | WEIGHT: 146.13 LBS | BODY MASS INDEX: 22.89 KG/M2

## 2025-04-24 DIAGNOSIS — Z12.5 SCREENING FOR PROSTATE CANCER: ICD-10-CM

## 2025-04-24 DIAGNOSIS — M54.30 SCIATICA, UNSPECIFIED LATERALITY: ICD-10-CM

## 2025-04-24 DIAGNOSIS — N18.31 CHRONIC KIDNEY DISEASE, STAGE 3A (H): ICD-10-CM

## 2025-04-24 DIAGNOSIS — Z00.00 ROUTINE GENERAL MEDICAL EXAMINATION AT A HEALTH CARE FACILITY: Primary | ICD-10-CM

## 2025-04-24 DIAGNOSIS — E11.42 TYPE 2 DIABETES MELLITUS WITH PERIPHERAL NEUROPATHY (H): ICD-10-CM

## 2025-04-24 DIAGNOSIS — E11.8 TYPE 2 DIABETES MELLITUS WITH COMPLICATION, WITHOUT LONG-TERM CURRENT USE OF INSULIN (H): ICD-10-CM

## 2025-04-24 LAB
ALBUMIN SERPL BCG-MCNC: 4.5 G/DL (ref 3.5–5.2)
ALBUMIN UR-MCNC: NEGATIVE MG/DL
ALP SERPL-CCNC: 72 U/L (ref 40–150)
ALT SERPL W P-5'-P-CCNC: 27 U/L (ref 0–70)
ANION GAP SERPL CALCULATED.3IONS-SCNC: 12 MMOL/L (ref 7–15)
APPEARANCE UR: CLEAR
AST SERPL W P-5'-P-CCNC: 34 U/L (ref 0–45)
BILIRUB SERPL-MCNC: 0.6 MG/DL
BILIRUB UR QL STRIP: NEGATIVE
BUN SERPL-MCNC: 31.1 MG/DL (ref 8–23)
CALCIUM SERPL-MCNC: 9.3 MG/DL (ref 8.8–10.4)
CHLORIDE SERPL-SCNC: 103 MMOL/L (ref 98–107)
CHOLEST SERPL-MCNC: 148 MG/DL
COLOR UR AUTO: YELLOW
CREAT SERPL-MCNC: 1.24 MG/DL (ref 0.67–1.17)
CREAT UR-MCNC: 81 MG/DL
EGFRCR SERPLBLD CKD-EPI 2021: 58 ML/MIN/1.73M2
ERYTHROCYTE [DISTWIDTH] IN BLOOD BY AUTOMATED COUNT: 13 % (ref 10–15)
EST. AVERAGE GLUCOSE BLD GHB EST-MCNC: 171 MG/DL
FASTING STATUS PATIENT QL REPORTED: YES
FASTING STATUS PATIENT QL REPORTED: YES
GLUCOSE SERPL-MCNC: 159 MG/DL (ref 70–99)
GLUCOSE UR STRIP-MCNC: >=1000 MG/DL
HBA1C MFR BLD: 7.6 % (ref 0–5.6)
HCO3 SERPL-SCNC: 25 MMOL/L (ref 22–29)
HCT VFR BLD AUTO: 44.7 % (ref 40–53)
HDLC SERPL-MCNC: 56 MG/DL
HGB BLD-MCNC: 15.3 G/DL (ref 13.3–17.7)
HGB UR QL STRIP: NEGATIVE
KETONES UR STRIP-MCNC: NEGATIVE MG/DL
LDLC SERPL CALC-MCNC: 73 MG/DL
LEUKOCYTE ESTERASE UR QL STRIP: NEGATIVE
MCH RBC QN AUTO: 31.8 PG (ref 26.5–33)
MCHC RBC AUTO-ENTMCNC: 34.2 G/DL (ref 31.5–36.5)
MCV RBC AUTO: 93 FL (ref 78–100)
MICROALBUMIN UR-MCNC: <12 MG/L
MICROALBUMIN/CREAT UR: NORMAL MG/G{CREAT}
NITRATE UR QL: NEGATIVE
NONHDLC SERPL-MCNC: 92 MG/DL
PH UR STRIP: 6 [PH] (ref 5–8)
PLATELET # BLD AUTO: 204 10E3/UL (ref 150–450)
POTASSIUM SERPL-SCNC: 4.4 MMOL/L (ref 3.4–5.3)
PROT SERPL-MCNC: 7.1 G/DL (ref 6.4–8.3)
PSA SERPL DL<=0.01 NG/ML-MCNC: 5.01 NG/ML
RBC # BLD AUTO: 4.81 10E6/UL (ref 4.4–5.9)
SODIUM SERPL-SCNC: 140 MMOL/L (ref 135–145)
SP GR UR STRIP: 1.02 (ref 1–1.03)
TRIGL SERPL-MCNC: 94 MG/DL
TSH SERPL DL<=0.005 MIU/L-ACNC: 2.79 UIU/ML (ref 0.3–4.2)
UROBILINOGEN UR STRIP-ACNC: 0.2 E.U./DL
WBC # BLD AUTO: 7.6 10E3/UL (ref 4–11)

## 2025-04-24 SDOH — HEALTH STABILITY: PHYSICAL HEALTH: ON AVERAGE, HOW MANY DAYS PER WEEK DO YOU ENGAGE IN MODERATE TO STRENUOUS EXERCISE (LIKE A BRISK WALK)?: 4 DAYS

## 2025-04-24 ASSESSMENT — PAIN SCALES - GENERAL: PAINLEVEL_OUTOF10: NO PAIN (0)

## 2025-04-24 ASSESSMENT — SOCIAL DETERMINANTS OF HEALTH (SDOH): HOW OFTEN DO YOU GET TOGETHER WITH FRIENDS OR RELATIVES?: PATIENT DECLINED

## 2025-04-24 NOTE — PROGRESS NOTES
Preventive Care Visit  [facility]  Merlin Higuera MD, Internal Medicine  Apr 24, 2025  {Provider  Link to SmartSet :621170}    {PROVIDER CHARTING PREFERENCE:730476}    Irene Cruz is a 84 year old, presenting for the following:  Physical (AWV)        4/24/2025     7:03 AM   Additional Questions   Roomed by Justin MATHEW CMA     {ROOMER if patient is in their first year of Medicare a vision screen is required click here to document the Vison screen and then refresh the note to pull in results  :324054}      HPI  ***   {MA/LPN/RN Pre-Provider Visit Orders- hCG/UA/Strep (Optional):108369}  {SUPERLIST (Optional):172283}  {additonal problems for provider to add (Optional):859475}  Advance Care Planning  {The storyboard will display whether the patient has ACP docs on file. Hover over the Code section in the storyboard to access the ACP documents. :402245}  {(AWV REQUIRED) Advance Care Planning Reviewed:681217}        4/24/2025   General Health   How would you rate your overall physical health? Good   Feel stress (tense, anxious, or unable to sleep) Rather much   (!) STRESS CONCERN      4/24/2025   Nutrition   Diet: Diabetic         4/24/2025   Exercise   Days per week of moderate/strenous exercise 4 days         4/24/2025   Social Factors   Frequency of gathering with friends or relatives Patient declined   Worry food won't last until get money to buy more No   Food not last or not have enough money for food? No   Do you have housing? (Housing is defined as stable permanent housing and does not include staying outside in a car, in a tent, in an abandoned building, in an overnight shelter, or couch-surfing.) Yes   Are you worried about losing your housing? No   Lack of transportation? No   Unable to get utilities (heat,electricity)? No         4/24/2025   Fall Risk   Fallen 2 or more times in the past year? No   Trouble with walking or balance? No          4/24/2025   Activities of Daily  Living- Home Safety   Needs help with the following daily activites None of the above   Safety concerns in the home None of the above         4/24/2025   Dental   Dentist two times every year? Yes         4/24/2025   Hearing Screening   Hearing concerns? None of the above         4/24/2025   Driving Risk Screening   Patient/family members have concerns about driving No         4/24/2025   General Alertness/Fatigue Screening   Have you been more tired than usual lately? No         4/24/2025   Urinary Incontinence Screening   Bothered by leaking urine in past 6 months No         Today's PHQ-2 Score:       4/24/2025     6:57 AM   PHQ-2 ( 1999 Pfizer)   Q1: Little interest or pleasure in doing things 1   Q2: Feeling down, depressed or hopeless 1   PHQ-2 Score 2    Q1: Little interest or pleasure in doing things Several days   Q2: Feeling down, depressed or hopeless Several days   PHQ-2 Score 2       Patient-reported           4/24/2025   Substance Use   Alcohol more than 3/day or more than 7/wk No   Do you have a current opioid prescription? No   How severe/bad is pain from 1 to 10? 5/10   Do you use any other substances recreationally? No     Social History     Tobacco Use    Smoking status: Never    Smokeless tobacco: Never   Substance Use Topics    Alcohol use: No     Comment: Alcoholic Drinks/day: rare    Drug use: No     {Provider  If there are gaps in the social history shown above, please follow the link to update and then refresh the note Link to Social and Substance History :866426}    {Link to Fracture Risk Assessment Tool (Optional):497378}    {Provider  REQUIRED FOR AWV Use the storyboard to review patient history, after sections have been marked as reviewed, refresh note to capture documentation:073385}  {Provider   REQUIRED AWV use this link to review and update sexual activity history  after section has been marked as reviewed, refresh note to capture documentation:870365}  Reviewed and updated as  "needed this visit by Provider                    {HISTORY OPTIONS (Optional):005631}  Current providers sharing in care for this patient include:  Patient Care Team:  Merlin Higuera MD as PCP - General  Merlin Higuera MD as Assigned PCP    The following health maintenance items are reviewed in Epic and correct as of today:  Health Maintenance   Topic Date Due    ANNUAL REVIEW OF HM ORDERS  Never done    ZOSTER IMMUNIZATION (1 of 2) Never done    DIABETIC FOOT EXAM  03/29/2023    COVID-19 Vaccine (5 - 2024-25 season) 09/01/2024    BMP  04/23/2025    MEDICARE ANNUAL WELLNESS VISIT  04/23/2025    HEMOGLOBIN  04/23/2025    A1C  06/26/2025    EYE EXAM  08/26/2025    LIPID  12/26/2025    MICROALBUMIN  12/26/2025    ADVANCE CARE PLANNING  03/26/2026    FALL RISK ASSESSMENT  04/24/2026    DTAP/TDAP/TD IMMUNIZATION (3 - Td or Tdap) 05/29/2034    PHQ-2 (once per calendar year)  Completed    INFLUENZA VACCINE  Completed    Pneumococcal Vaccine: 50+ Years  Completed    URINALYSIS  Completed    RSV VACCINE  Completed    HPV IMMUNIZATION  Aged Out    MENINGITIS IMMUNIZATION  Aged Out       {ROS Picklists (Optional):681818}     Objective    Exam  /78 (BP Location: Right arm, Patient Position: Sitting, Cuff Size: Adult Regular)   Pulse 90   Resp 16   Wt 66.3 kg (146 lb 2 oz)   SpO2 98%   BMI 22.89 kg/m     Estimated body mass index is 22.89 kg/m  as calculated from the following:    Height as of 12/26/24: 1.702 m (5' 7\").    Weight as of this encounter: 66.3 kg (146 lb 2 oz).    Physical Exam  {Exam Choices (Optional):286401}  {Provider  The Mini-Cog is incomplete, use link to complete and refresh note Link to Mini-Cog :560752}      4/23/2024   Mini Cog   Clock Draw Score 0 Abnormal   3 Item Recall 1 object recalled   Mini Cog Total Score 1     {A Mini-Cog total score of 0-2 suggests the possibility of dementia, score of 3-5 suggests no dementia:395374}         Signed Electronically by: Merlin Higuera, " MD  {Email feedback regarding this note to [email or web address]   :727386}

## 2025-04-24 NOTE — PROGRESS NOTES
Annual Wellness Visit:  Anthony Evangelista  is a 84 year old male  who presents for an annual wellness visit.  Annual wellness visit.  Completed    Advance care planning done.    Falls risk assessment also accomplished.    Cognitive assessment was completed and the current provider this examiner and patient sharing was also completed.  Counseling discussion regarding the following- : fall prevention, nutrition, physical activity, tobacco-use cessation, social engagement, weight loss and cognition. Please provide and update patient PPPS, including personalized health advice and appropriate referral to health education or preventive counseling services or programs as needed.   Physician and patient sharing was accomplished.    I do not prescribe this patient opioids.  The patient's provider list includes myself REGAN BRUMFIELD as the primary care general internist and PCP.  Cognitive assessment was done the patient was able to draw the base of an analog clock and remember 3 items.  Labs ordered today include hemogram comprehensive metabolic profile lipid panel TSH urinalysis and PSA will be checked for male patients and will not be checked of course for female patients.  Emotional mental health was normal.  Functional capacity ADLs and safety in the home was assessed and all okay.        Assessment/Plan:  (Z00.00) Routine general medical examination at a health care facility  (primary encounter diagnosis)  Comment: Male patient with enlarged prostate and digital rectal exam with no nodularity.  PSA level pending  Plan: CBC with platelets, Comprehensive metabolic         panel, Lipid panel reflex to direct LDL         Fasting, TSH, UA Macroscopic with reflex to         Microscopic and Culture        PSA level pending enlarged prostate noted on rectal exam smooth without nodularity.    (E11.8) Type 2 diabetes mellitus with complication, without long-term current use of insulin (H)  Comment: Blood sugars generally at home  150.  Plan: metFORMIN (GLUCOPHAGE) 500 MG tablet, Albumin         Random Urine Quantitative with Creat Ratio,         Hemoglobin A1c        Denies polyuria polydipsia stable.    (N18.31) Chronic kidney disease, stage 3a (H)  Comment: Stable serum creatinine urea nitrogen potassium and CO2 level pending.  Plan: Besides the importance of glycemic control and blood pressure control to maintain kidney function and may proper good way.  Blood pressure today 120/78.    (E11.42) Type 2 diabetes mellitus with peripheral neuropathy (H)  Comment: Denies polyuria polydipsia no symptoms of peripheral neuropathy.  Plan: A1c level is pending today.  Along with urine for microalbumin and lipid panel.  Blood sugar will be done as part of comprehensive metabolic profile.    (Z12.5) Screening for prostate cancer  Comment: Prostate was enlarged on exam at 2/4 without nodularity nothing to suggest neoplasia.  Nocturia x 1.  Plan: Prostate Specific Antigen Screen        BPH seen on rectal examination.  Smooth prostate without nodularity.    (M54.30) Sciatica, unspecified laterality  Comment: Intermittent late bothers patient but currently well-controlled.  Plan: Emphasized the importance of core strengthening exercises to limit sciatica-like discomfort.        Subjective:   Medical History:    Non-smoker    No alcohol.  No drug allergies.    Prior surgeries include appendectomy tonsillectomy history of hyperlipidemia and type 2 diabetes related to insulin resistance although the patient is not obese BMI is 23.  Not ketoacidosis prone type diabetes.  Diabetes present for 10 to 15 years.  Stable.  Blood sugars run 150.  Medications for diabetes including Jardiance and metformin quite well.  Blood sugars at home around 150.  8.  Fasting.  No past medical history on file.  Current Outpatient Medications   Medication Sig Dispense Refill    ACCU-CHEK COMPACT PLUS test strip 1 strip by In Vitro route 2 times daily 100 strip 11    ACCU-CHEK  GUIDE test strip USE TO TEST BLOOD SUGARS TWICE DAILY 100 strip 0    empagliflozin (JARDIANCE) 25 MG TABS tablet TAKE ONE TABLET BY MOUTH ONE TIME DAILY 30 tablet 2    metFORMIN (GLUCOPHAGE) 500 MG tablet Take 1 tablet (500 mg) by mouth 2 times daily (with meals). 180 tablet 3    gd-ky-TY-vit R-hbdbe-bsds-zeax (OCUVITE EYE PLUS MULTI) 200- mcg Tab [LE-KP-HT-VIT Z-DIEGR-EIJN-ZEAX (OCUVITE EYE PLUS MULTI) 200- MCG TAB] Take 1 tablet by mouth daily. 180 tablet 0    ONE TOUCH DELICA LANCETS MISC [ONE TOUCH DELICA LANCETS MISC] Use As Directed. Test twice daily.      simvastatin (ZOCOR) 40 MG tablet Take 1 tablet (40 mg) by mouth daily. 90 tablet 11     No current facility-administered medications for this visit.     Immunization History   Administered Date(s) Administered    COVID-19 MONOVALENT 12+ (Pfizer) 2021, 2021, 2021    COVID-19 Monovalent 12+ (Pfizer ) 2022    DT (PEDS <7y) 2004    Flu, Unspecified 2007, 10/07/2008, 2009, 10/08/2010, 10/28/2011, 10/22/2014    Influenza (High Dose) Trivalent,PF (Fluzone) 10/21/2015, 10/17/2016, 10/24/2017, 10/08/2018, 10/15/2019, 10/29/2024    Influenza (prior to ) 10/08/2010    Influenza Vaccine 65+ (FLUAD) 2023    Influenza Vaccine 65+ (Fluzone HD) 10/18/2020, 10/29/2021, 10/26/2022    Influenza Vaccine, 6+MO IM (QUADRIVALENT W/PRESERVATIVES) 10/05/2012, 10/22/2013    Pneumo Conj 13-V (2010&after) 12/10/2015    Pneumococcal 23 valent 10/25/2006    RSV Vaccine (Arexvy) 2023    TDAP (Adacel,Boostrix) 07/15/2013, 2024    Td,adult,historic,unspecified 2004       Surgical History:  Past Surgical History:   Procedure Laterality Date    [facility] APPENDECTOMY      Description: Appendectomy;  Recorded: 2008;        Family History:  Mother  of cancer and MS 78.    Father  at 56 MI.  1 daughter  early cardiopulmonary disease age 51.  Congenital.    1 son well obese with diverticulitis.  1  grandson with autism but functioning well age 30.  Wife with bronchiectasis stable.    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    Health Maintenances:  Immunizations and vaccines reviewed and up-to-date.    Objective:  /78 (BP Location: Right arm, Patient Position: Sitting, Cuff Size: Adult Regular)   Pulse 90   Resp 16   Wt 66.3 kg (146 lb 2 oz)   SpO2 98%   BMI 22.89 kg/m    Easily conversant good spirited not in acute distress not toxic.  He appeared well thin in stature BMI 23 vital signs stable afebrile.  120/78.  Skin dry lymph negative neuro negative psych normal back straight chest clear heart negative abdomen benign genital testicular scrotal exam negative no hernias extremities free of edema cyanosis or clubbing distal pulses all 4 extremities are intact and strong no thyromegaly no thyroid nodules no carotid bruits no lymphadenopathy appreciated lymph bearing areas no neck vein distention rectal exam showed large prostate at 2/4 without nodularity induration nothing to suggest malignancy.  Nocturia x 1 stable.  Observe for now PSA level pending.    We had a good examination and a very good discussion today.    Merlin Higuera MD    Internal Medicine

## 2025-05-12 ENCOUNTER — TRANSFERRED RECORDS (OUTPATIENT)
Dept: HEALTH INFORMATION MANAGEMENT | Facility: CLINIC | Age: 85
End: 2025-05-12
Payer: MEDICARE

## 2025-05-21 ENCOUNTER — TELEPHONE (OUTPATIENT)
Dept: INTERNAL MEDICINE | Facility: CLINIC | Age: 85
End: 2025-05-21
Payer: MEDICARE

## 2025-05-21 DIAGNOSIS — R10.84 ABDOMINAL PAIN, GENERALIZED: Primary | ICD-10-CM

## 2025-05-21 NOTE — TELEPHONE ENCOUNTER
Order/Referral Request    Who is requesting: pt    Orders being requested: CT Abdomen Pelvis w Contrast     Reason service is needed/diagnosis: irregular bowel movements, wants to see if numbers are the same from last year. Also has an enlarged prostate    When are orders needed by: asap    Has this been discussed with Provider: Yes    Does patient have a preference on a Group/Provider/Facility? [facility]    Does patient have an appointment scheduled?: No    Where to send orders: Place orders within Epic    Could we send this information to you in [place] or would you prefer to receive a phone call?:   No preference   Okay to leave a detailed message?: Yes at Cell number on file:    Telephone Information:   Mobile [phone]

## 2025-05-21 NOTE — TELEPHONE ENCOUNTER
Patient is requesting an order for CT Abdomen Pelvis. Last CT was done on 3/5/24. Please advise if patient needs to be seen or put in order for CT

## 2025-05-28 ENCOUNTER — HOSPITAL ENCOUNTER (OUTPATIENT)
Dept: CT IMAGING | Facility: HOSPITAL | Age: 85
Discharge: HOME | End: 2025-05-28
Attending: INTERNAL MEDICINE
Payer: MEDICARE

## 2025-05-28 DIAGNOSIS — R10.84 ABDOMINAL PAIN, GENERALIZED: ICD-10-CM

## 2025-05-28 LAB
CREAT BLD-MCNC: 1.2 MG/DL (ref 0.7–1.2)
EGFRCR SERPLBLD CKD-EPI 2021: 60 ML/MIN/1.73M2

## 2025-05-28 PROCEDURE — 82565 ASSAY OF CREATININE: CPT

## 2025-05-28 PROCEDURE — 74177 CT ABD & PELVIS W/CONTRAST: CPT

## 2025-05-28 PROCEDURE — 250N000011 HC RX IP 250 OP 636: Performed by: INTERNAL MEDICINE

## 2025-05-28 RX ORDER — IOPAMIDOL 755 MG/ML
71 INJECTION, SOLUTION INTRAVASCULAR ONCE
Status: COMPLETED | OUTPATIENT
Start: 2025-05-28 | End: 2025-05-28

## 2025-05-28 RX ADMIN — IOPAMIDOL 71 ML: 755 INJECTION, SOLUTION INTRAVENOUS at 19:31

## 2025-05-29 ENCOUNTER — RESULTS FOLLOW-UP (OUTPATIENT)
Dept: INTERNAL MEDICINE | Facility: CLINIC | Age: 85
End: 2025-05-29

## 2025-06-14 DIAGNOSIS — E11.42 TYPE 2 DIABETES MELLITUS WITH PERIPHERAL NEUROPATHY (H): ICD-10-CM

## 2025-06-16 RX ORDER — EMPAGLIFLOZIN 25 MG/1
25 TABLET, FILM COATED ORAL DAILY
Qty: 30 TABLET | Refills: 2 | Status: SHIPPED | OUTPATIENT
Start: 2025-06-16

## 2025-06-30 ENCOUNTER — VIRTUAL VISIT (OUTPATIENT)
Dept: INTERNAL MEDICINE | Facility: CLINIC | Age: 85
End: 2025-06-30
Payer: MEDICARE

## 2025-06-30 DIAGNOSIS — K58.9 IRRITABLE BOWEL SYNDROME WITHOUT DIARRHEA: Primary | ICD-10-CM

## 2025-06-30 PROCEDURE — 98014 SYNCH AUDIO-ONLY EST MOD 30: CPT | Performed by: INTERNAL MEDICINE

## 2025-06-30 NOTE — PROGRESS NOTES
Anthony Evangelista is a 84 year oldwho is being evaluated via a billable telephone visit.       What phone number would you like to be contacted at? [phone]      Assessment & Plan  (K58.9) Irritable bowel syndrome without diarrhea  (primary encounter diagnosis)  Comment: Using Metamucil last 2 to 3 months with benefit.  Recent comprehensive lab testing and imaging studies CT abdomen and pelvis all clear but for prostate enlargement plus bladder stone asymptomatic.  Urinalysis clear as well.  Plan: Continue same exercise program staying well-hydrated and high-fiber diet.    If symptoms persist in terms of IBS would recommend a full colonoscopy with colorectal surgery group Dr. Kathy Ramirez presiding at [phone].         15 minutes spent on the date of the encounter doing chart review, patient visit and documentation I spoke with the patient directly on the phone during this telephonic virtual visit for 11 minutes we had a good discussion the patient was in his [facility] home and I was here at the [facility] in [facility] for this telephonic virtual visit.     Subjective  Bowel movements have increased and are more regular.  Discussed the potential benefits of Metamucil including weight loss lowering of the lipid panel results improve glycemic control rhythmicity restored for intestinal bowel function and improvement and establishment of a better microbial biome.  We had a good discussion.  Patient is to continue Metamucil same.  It has been 5 to 6 years since his last colonoscopy and I suggested redoing the colonoscopy sometime before the end of calendar year 2025 with Dr. Kathy Ramirez of colorectal surgery group at [phone]     Review of Systems   No blood in the stool or urine no weight loss med list reviewed reconciled in the chart.  Generally well-tolerated.       Merlin Higuera MD    The longitudinal plan of care for the diagnosis(es)/condition(s) as documented were  addressed during this visit. Due to the added complexity in care, I will continue to support Anthony in the subsequent management and with ongoing continuity of care.